# Patient Record
Sex: FEMALE | Race: WHITE | NOT HISPANIC OR LATINO | Employment: OTHER | URBAN - METROPOLITAN AREA
[De-identification: names, ages, dates, MRNs, and addresses within clinical notes are randomized per-mention and may not be internally consistent; named-entity substitution may affect disease eponyms.]

---

## 2017-01-05 ENCOUNTER — OUTPATIENT INFUSION SERVICES (OUTPATIENT)
Dept: ONCOLOGY | Facility: MEDICAL CENTER | Age: 67
End: 2017-01-05
Attending: FAMILY MEDICINE
Payer: MEDICARE

## 2017-01-05 VITALS
TEMPERATURE: 97.5 F | BODY MASS INDEX: 24.2 KG/M2 | OXYGEN SATURATION: 99 % | DIASTOLIC BLOOD PRESSURE: 77 MMHG | WEIGHT: 150.57 LBS | RESPIRATION RATE: 18 BRPM | HEIGHT: 66 IN | SYSTOLIC BLOOD PRESSURE: 148 MMHG | HEART RATE: 82 BPM

## 2017-01-05 LAB
CA-I BLD ISE-SCNC: 1.15 MMOL/L (ref 1.1–1.3)
CREAT BLD-MCNC: 0.7 MG/DL (ref 0.5–1.4)

## 2017-01-05 PROCEDURE — 700111 HCHG RX REV CODE 636 W/ 250 OVERRIDE (IP): Performed by: NURSE PRACTITIONER

## 2017-01-05 PROCEDURE — 82330 ASSAY OF CALCIUM: CPT

## 2017-01-05 PROCEDURE — 82565 ASSAY OF CREATININE: CPT

## 2017-01-05 PROCEDURE — 36415 COLL VENOUS BLD VENIPUNCTURE: CPT

## 2017-01-05 PROCEDURE — 96374 THER/PROPH/DIAG INJ IV PUSH: CPT

## 2017-01-05 RX ORDER — IBANDRONATE SODIUM 3 MG/3 ML
3 SYRINGE (ML) INTRAVENOUS ONCE
Status: COMPLETED | OUTPATIENT
Start: 2017-01-05 | End: 2017-01-05

## 2017-01-05 RX ADMIN — IBANDRONATE SODIUM 3 MG: 3 INJECTION, SOLUTION INTRAVENOUS at 11:51

## 2017-01-05 NOTE — PROGRESS NOTES
Pt presents ambulatory with  for Boniva injection. Pt reports feeling well with no s/s of infection and no invasive dental work within the last 2 months. IV established and labs drawn. Labs reviewed and WNL for treatment. IV push of medication given slowly over 30 seconds and line rinsed with saline. IV d/c'd with gauze and coband applied to site. Pt to return in 3 months, card given to pt. Pt left ambulatory with  in no distress.

## 2017-01-05 NOTE — PROGRESS NOTES
Labs 01/05/17    IstatCr = 0.7 mg/dL   ~CrCl = 83 mL/min  Ionized Ca+2 = 1.15 mmol/L    Ok for Boniva 3 mg IVP over 15-30 seconds today.  Last dose 10/04/16.    Julissa Weaver, RubenD

## 2017-01-17 ENCOUNTER — HOSPITAL ENCOUNTER (OUTPATIENT)
Dept: LAB | Facility: MEDICAL CENTER | Age: 67
End: 2017-01-17
Attending: FAMILY MEDICINE
Payer: MEDICARE

## 2017-01-17 ENCOUNTER — HOSPITAL ENCOUNTER (OUTPATIENT)
Dept: RADIOLOGY | Facility: MEDICAL CENTER | Age: 67
End: 2017-01-17
Attending: FAMILY MEDICINE
Payer: MEDICARE

## 2017-01-17 DIAGNOSIS — M54.2 CERVICALGIA: ICD-10-CM

## 2017-01-17 LAB
25(OH)D3 SERPL-MCNC: 44 NG/ML (ref 30–100)
ALBUMIN SERPL BCP-MCNC: 3.8 G/DL (ref 3.2–4.9)
ALBUMIN/GLOB SERPL: 1.4 G/DL
ALP SERPL-CCNC: 87 U/L (ref 30–99)
ALT SERPL-CCNC: 46 U/L (ref 2–50)
ANION GAP SERPL CALC-SCNC: 5 MMOL/L (ref 0–11.9)
AST SERPL-CCNC: 35 U/L (ref 12–45)
BASOPHILS # BLD AUTO: 0.07 K/UL (ref 0–0.12)
BASOPHILS NFR BLD AUTO: 1.2 % (ref 0–1.8)
BILIRUB SERPL-MCNC: 0.9 MG/DL (ref 0.1–1.5)
BUN SERPL-MCNC: 15 MG/DL (ref 8–22)
CALCIUM SERPL-MCNC: 9.3 MG/DL (ref 8.5–10.5)
CHLORIDE SERPL-SCNC: 108 MMOL/L (ref 96–112)
CHOLEST SERPL-MCNC: 123 MG/DL (ref 100–199)
CO2 SERPL-SCNC: 26 MMOL/L (ref 20–33)
CREAT SERPL-MCNC: 0.75 MG/DL (ref 0.5–1.4)
EOSINOPHIL # BLD: 0.16 K/UL (ref 0–0.51)
EOSINOPHIL NFR BLD AUTO: 2.7 % (ref 0–6.9)
ERYTHROCYTE [DISTWIDTH] IN BLOOD BY AUTOMATED COUNT: 45.4 FL (ref 35.9–50)
GLOBULIN SER CALC-MCNC: 2.7 G/DL (ref 1.9–3.5)
GLUCOSE SERPL-MCNC: 82 MG/DL (ref 65–99)
HCT VFR BLD AUTO: 45.2 % (ref 37–47)
HDLC SERPL-MCNC: 52 MG/DL
HGB BLD-MCNC: 15.3 G/DL (ref 12–16)
IMM GRANULOCYTES # BLD AUTO: 0.01 K/UL (ref 0–0.11)
IMM GRANULOCYTES NFR BLD AUTO: 0.2 % (ref 0–0.9)
LDLC SERPL CALC-MCNC: 54 MG/DL
LYMPHOCYTES # BLD: 1.89 K/UL (ref 1–4.8)
LYMPHOCYTES NFR BLD AUTO: 31.4 % (ref 22–41)
MCH RBC QN AUTO: 32 PG (ref 27–33)
MCHC RBC AUTO-ENTMCNC: 33.8 G/DL (ref 33.6–35)
MCV RBC AUTO: 94.6 FL (ref 81.4–97.8)
MONOCYTES # BLD: 0.46 K/UL (ref 0–0.85)
MONOCYTES NFR BLD AUTO: 7.7 % (ref 0–13.4)
NEUTROPHILS # BLD: 3.42 K/UL (ref 2–7.15)
NEUTROPHILS NFR BLD AUTO: 56.8 % (ref 44–72)
NRBC # BLD AUTO: 0 K/UL
NRBC BLD-RTO: 0 /100 WBC
PLATELET # BLD AUTO: 281 K/UL (ref 164–446)
PMV BLD AUTO: 10.6 FL (ref 9–12.9)
POTASSIUM SERPL-SCNC: 3.9 MMOL/L (ref 3.6–5.5)
PROT SERPL-MCNC: 6.5 G/DL (ref 6–8.2)
RBC # BLD AUTO: 4.78 M/UL (ref 4.2–5.4)
SODIUM SERPL-SCNC: 139 MMOL/L (ref 135–145)
TRIGL SERPL-MCNC: 87 MG/DL (ref 0–149)
TSH SERPL DL<=0.005 MIU/L-ACNC: 1.36 UIU/ML (ref 0.3–3.7)
WBC # BLD AUTO: 6 K/UL (ref 4.8–10.8)

## 2017-01-17 PROCEDURE — 72050 X-RAY EXAM NECK SPINE 4/5VWS: CPT

## 2017-01-23 ENCOUNTER — HOSPITAL ENCOUNTER (OUTPATIENT)
Dept: RADIOLOGY | Facility: MEDICAL CENTER | Age: 67
End: 2017-01-23
Attending: FAMILY MEDICINE
Payer: MEDICARE

## 2017-01-23 DIAGNOSIS — R94.31 NONSPECIFIC ABNORMAL ELECTROCARDIOGRAM (ECG) (EKG): ICD-10-CM

## 2017-01-23 PROCEDURE — A9502 TC99M TETROFOSMIN: HCPCS

## 2017-01-23 PROCEDURE — 700111 HCHG RX REV CODE 636 W/ 250 OVERRIDE (IP)

## 2017-01-23 RX ORDER — AMINOPHYLLINE 25 MG/ML
INJECTION, SOLUTION INTRAVENOUS
Status: COMPLETED
Start: 2017-01-23 | End: 2017-01-23

## 2017-01-23 RX ORDER — REGADENOSON 0.08 MG/ML
INJECTION, SOLUTION INTRAVENOUS
Status: COMPLETED
Start: 2017-01-23 | End: 2017-01-23

## 2017-01-23 RX ADMIN — AMINOPHYLLINE 100 MG: 25 INJECTION, SOLUTION INTRAVENOUS at 09:39

## 2017-01-23 RX ADMIN — REGADENOSON 0.4 MG: 0.08 INJECTION, SOLUTION INTRAVENOUS at 09:34

## 2017-01-23 NOTE — PROGRESS NOTES
Nursing care plan includes knowledge deficit, potential for discomfort, potential for compromised cardiac output. POC includes teaching, comfort measures and reassurance, and access to code cart, cardiology stand by and availability of rapid response team. Pt verbalizes good understanding of benefits and risks of pharmacological cardiac stress test. Informed consent obtained.Lexiscan given, pt developed following symptoms: SOB headache, nausea tingling hands. Reversed with Aminophylline per protocol. VS stable, symptoms resolved. To waiting room,  Fluids and/or snack given, awaiting second scan.Nursing goals met.

## 2017-02-01 ENCOUNTER — HOSPITAL ENCOUNTER (OUTPATIENT)
Dept: RADIOLOGY | Facility: MEDICAL CENTER | Age: 67
End: 2017-02-01
Attending: FAMILY MEDICINE
Payer: MEDICARE

## 2017-02-01 DIAGNOSIS — R92.2 INCONCLUSIVE MAMMOGRAM: ICD-10-CM

## 2017-02-01 PROCEDURE — G0204 DX MAMMO INCL CAD BI: HCPCS

## 2017-02-03 ENCOUNTER — APPOINTMENT (OUTPATIENT)
Dept: PHYSICAL THERAPY | Facility: REHABILITATION | Age: 67
End: 2017-02-03
Payer: MEDICARE

## 2017-02-20 ENCOUNTER — HOSPITAL ENCOUNTER (OUTPATIENT)
Dept: RADIOLOGY | Facility: MEDICAL CENTER | Age: 67
End: 2017-02-20
Attending: FAMILY MEDICINE
Payer: MEDICARE

## 2017-02-20 DIAGNOSIS — R60.9 EDEMA, UNSPECIFIED TYPE: ICD-10-CM

## 2017-02-20 PROCEDURE — 93970 EXTREMITY STUDY: CPT | Mod: 26 | Performed by: SURGERY

## 2017-02-20 PROCEDURE — 93970 EXTREMITY STUDY: CPT

## 2017-02-21 ENCOUNTER — HOSPITAL ENCOUNTER (OUTPATIENT)
Dept: LAB | Facility: MEDICAL CENTER | Age: 67
End: 2017-02-21
Attending: FAMILY MEDICINE
Payer: MEDICARE

## 2017-02-21 LAB
ALBUMIN SERPL BCP-MCNC: 4 G/DL (ref 3.2–4.9)
ALBUMIN/GLOB SERPL: 1.3 G/DL
ALP SERPL-CCNC: 98 U/L (ref 30–99)
ALT SERPL-CCNC: 54 U/L (ref 2–50)
ANION GAP SERPL CALC-SCNC: 5 MMOL/L (ref 0–11.9)
APPEARANCE UR: CLEAR
AST SERPL-CCNC: 34 U/L (ref 12–45)
BACTERIA #/AREA URNS HPF: ABNORMAL /HPF
BASOPHILS # BLD AUTO: 0.06 K/UL (ref 0–0.12)
BASOPHILS NFR BLD AUTO: 1 % (ref 0–1.8)
BILIRUB SERPL-MCNC: 0.6 MG/DL (ref 0.1–1.5)
BILIRUB UR QL STRIP.AUTO: NEGATIVE
BUN SERPL-MCNC: 21 MG/DL (ref 8–22)
CALCIUM SERPL-MCNC: 9.8 MG/DL (ref 8.5–10.5)
CHLORIDE SERPL-SCNC: 107 MMOL/L (ref 96–112)
CO2 SERPL-SCNC: 25 MMOL/L (ref 20–33)
COLOR UR AUTO: YELLOW
CREAT SERPL-MCNC: 0.63 MG/DL (ref 0.5–1.4)
CULTURE IF INDICATED INDCX: NO UA CULTURE
EOSINOPHIL # BLD: 0.13 K/UL (ref 0–0.51)
EOSINOPHIL NFR BLD AUTO: 2.1 % (ref 0–6.9)
EPITHELIAL CELLS 1715: ABNORMAL /HPF
ERYTHROCYTE [DISTWIDTH] IN BLOOD BY AUTOMATED COUNT: 45.4 FL (ref 35.9–50)
GLOBULIN SER CALC-MCNC: 3.2 G/DL (ref 1.9–3.5)
GLUCOSE SERPL-MCNC: 91 MG/DL (ref 65–99)
GLUCOSE UR STRIP.AUTO-MCNC: NEGATIVE MG/DL
HCT VFR BLD AUTO: 43.1 % (ref 37–47)
HGB BLD-MCNC: 14.4 G/DL (ref 12–16)
IMM GRANULOCYTES # BLD AUTO: 0.01 K/UL (ref 0–0.11)
IMM GRANULOCYTES NFR BLD AUTO: 0.2 % (ref 0–0.9)
KETONES UR STRIP.AUTO-MCNC: NEGATIVE MG/DL
LEUKOCYTE ESTERASE UR QL STRIP.AUTO: NEGATIVE
LYMPHOCYTES # BLD: 1.95 K/UL (ref 1–4.8)
LYMPHOCYTES NFR BLD AUTO: 31.2 % (ref 22–41)
MCH RBC QN AUTO: 31.4 PG (ref 27–33)
MCHC RBC AUTO-ENTMCNC: 33.4 G/DL (ref 33.6–35)
MCV RBC AUTO: 94.1 FL (ref 81.4–97.8)
MICRO URNS: ABNORMAL
MONOCYTES # BLD: 0.52 K/UL (ref 0–0.85)
MONOCYTES NFR BLD AUTO: 8.3 % (ref 0–13.4)
MUCOUS THREADS URNS QL MICRO: ABNORMAL /HPF
NEUTROPHILS # BLD: 3.58 K/UL (ref 2–7.15)
NEUTROPHILS NFR BLD AUTO: 57.2 % (ref 44–72)
NITRITE UR QL STRIP.AUTO: NEGATIVE
NRBC # BLD AUTO: 0.02 K/UL
NRBC BLD-RTO: 0.3 /100 WBC
PH UR: 5 [PH]
PLATELET # BLD AUTO: 273 K/UL (ref 164–446)
PMV BLD AUTO: 10.5 FL (ref 9–12.9)
POTASSIUM SERPL-SCNC: 3.7 MMOL/L (ref 3.6–5.5)
PROT SERPL-MCNC: 7.2 G/DL (ref 6–8.2)
PROT UR QL STRIP: 30 MG/DL
RBC # BLD AUTO: 4.58 M/UL (ref 4.2–5.4)
RBC #/AREA URNS HPF: ABNORMAL /HPF
RBC UR QL AUTO: NEGATIVE
SODIUM SERPL-SCNC: 137 MMOL/L (ref 135–145)
SP GR UR STRIP.AUTO: 1.03
TSH SERPL DL<=0.005 MIU/L-ACNC: 1.61 UIU/ML (ref 0.3–3.7)
WBC # BLD AUTO: 6.3 K/UL (ref 4.8–10.8)
WBC #/AREA URNS HPF: ABNORMAL /HPF

## 2017-02-21 PROCEDURE — 85025 COMPLETE CBC W/AUTO DIFF WBC: CPT

## 2017-02-21 PROCEDURE — 81001 URINALYSIS AUTO W/SCOPE: CPT

## 2017-02-21 PROCEDURE — 36415 COLL VENOUS BLD VENIPUNCTURE: CPT

## 2017-02-21 PROCEDURE — 80053 COMPREHEN METABOLIC PANEL: CPT

## 2017-02-21 PROCEDURE — 84443 ASSAY THYROID STIM HORMONE: CPT

## 2017-03-02 ENCOUNTER — HOSPITAL ENCOUNTER (OUTPATIENT)
Dept: PHYSICAL THERAPY | Facility: MEDICAL CENTER | Age: 67
End: 2017-03-02
Attending: FAMILY MEDICINE
Payer: MEDICARE

## 2017-03-02 ENCOUNTER — APPOINTMENT (OUTPATIENT)
Dept: ONCOLOGY | Facility: MEDICAL CENTER | Age: 67
End: 2017-03-02
Attending: FAMILY MEDICINE
Payer: MEDICARE

## 2017-03-02 PROCEDURE — 97161 PT EVAL LOW COMPLEX 20 MIN: CPT

## 2017-03-02 PROCEDURE — 97110 THERAPEUTIC EXERCISES: CPT

## 2017-03-07 ENCOUNTER — HOSPITAL ENCOUNTER (OUTPATIENT)
Dept: PHYSICAL THERAPY | Facility: MEDICAL CENTER | Age: 67
End: 2017-03-07
Attending: FAMILY MEDICINE
Payer: MEDICARE

## 2017-03-07 PROCEDURE — 97110 THERAPEUTIC EXERCISES: CPT

## 2017-03-07 PROCEDURE — 97140 MANUAL THERAPY 1/> REGIONS: CPT

## 2017-03-08 ENCOUNTER — HOSPITAL ENCOUNTER (OUTPATIENT)
Dept: LAB | Facility: MEDICAL CENTER | Age: 67
End: 2017-03-08
Attending: FAMILY MEDICINE
Payer: MEDICARE

## 2017-03-08 LAB
ALT SERPL-CCNC: 45 U/L (ref 2–50)
AST SERPL-CCNC: 28 U/L (ref 12–45)
ERYTHROCYTE [SEDIMENTATION RATE] IN BLOOD BY WESTERGREN METHOD: 5 MM/HOUR (ref 0–30)
FERRITIN SERPL-MCNC: 86.1 NG/ML (ref 10–291)
HAV IGM SERPL QL IA: NEGATIVE
HBV CORE IGM SERPL QL IA: NEGATIVE
HBV SURFACE AG SERPL QL IA: NEGATIVE
HCV AB S/CO SERPL IA: NEGATIVE
IRON SATN MFR SERPL: 28 % (ref 15–55)
IRON SERPL-MCNC: 81 UG/DL (ref 40–170)
TIBC SERPL-MCNC: 290 UG/DL (ref 250–450)
TRANSFERRIN SERPL-MCNC: 214 MG/DL (ref 200–370)

## 2017-03-08 PROCEDURE — 86038 ANTINUCLEAR ANTIBODIES: CPT

## 2017-03-08 PROCEDURE — 82103 ALPHA-1-ANTITRYPSIN TOTAL: CPT

## 2017-03-08 PROCEDURE — 82390 ASSAY OF CERULOPLASMIN: CPT

## 2017-03-08 PROCEDURE — 84450 TRANSFERASE (AST) (SGOT): CPT

## 2017-03-08 PROCEDURE — 83550 IRON BINDING TEST: CPT

## 2017-03-08 PROCEDURE — 36415 COLL VENOUS BLD VENIPUNCTURE: CPT

## 2017-03-08 PROCEDURE — 85652 RBC SED RATE AUTOMATED: CPT

## 2017-03-08 PROCEDURE — 80074 ACUTE HEPATITIS PANEL: CPT

## 2017-03-08 PROCEDURE — 82728 ASSAY OF FERRITIN: CPT

## 2017-03-08 PROCEDURE — 84466 ASSAY OF TRANSFERRIN: CPT

## 2017-03-08 PROCEDURE — 83540 ASSAY OF IRON: CPT

## 2017-03-08 PROCEDURE — 84460 ALANINE AMINO (ALT) (SGPT): CPT

## 2017-03-09 ENCOUNTER — HOSPITAL ENCOUNTER (OUTPATIENT)
Dept: PHYSICAL THERAPY | Facility: MEDICAL CENTER | Age: 67
End: 2017-03-09
Attending: FAMILY MEDICINE
Payer: MEDICARE

## 2017-03-09 PROCEDURE — 97110 THERAPEUTIC EXERCISES: CPT

## 2017-03-10 LAB
A1AT SERPL-MCNC: 129 MG/DL (ref 90–200)
CERULOPLASMIN SERPL-MCNC: 24 MG/DL (ref 17–54)

## 2017-03-11 LAB
NUCLEAR IGG SER QL IA: DETECTED
NUCLEAR IGG TITR SER IF: ABNORMAL {TITER}

## 2017-03-13 ENCOUNTER — HOSPITAL ENCOUNTER (OUTPATIENT)
Dept: PHYSICAL THERAPY | Facility: MEDICAL CENTER | Age: 67
End: 2017-03-13
Attending: FAMILY MEDICINE
Payer: MEDICARE

## 2017-03-13 PROCEDURE — 97110 THERAPEUTIC EXERCISES: CPT

## 2017-03-14 ENCOUNTER — HOSPITAL ENCOUNTER (OUTPATIENT)
Dept: RADIOLOGY | Facility: MEDICAL CENTER | Age: 67
End: 2017-03-14
Attending: FAMILY MEDICINE
Payer: MEDICARE

## 2017-03-14 DIAGNOSIS — K76.9 LIVER DISEASE: ICD-10-CM

## 2017-03-14 PROCEDURE — 76700 US EXAM ABDOM COMPLETE: CPT

## 2017-03-15 ENCOUNTER — HOSPITAL ENCOUNTER (OUTPATIENT)
Dept: PHYSICAL THERAPY | Facility: MEDICAL CENTER | Age: 67
End: 2017-03-15
Attending: FAMILY MEDICINE
Payer: MEDICARE

## 2017-03-15 PROCEDURE — 97110 THERAPEUTIC EXERCISES: CPT

## 2017-03-15 PROCEDURE — 97140 MANUAL THERAPY 1/> REGIONS: CPT

## 2017-03-21 ENCOUNTER — HOSPITAL ENCOUNTER (OUTPATIENT)
Dept: PHYSICAL THERAPY | Facility: MEDICAL CENTER | Age: 67
End: 2017-03-21
Attending: FAMILY MEDICINE
Payer: MEDICARE

## 2017-03-21 PROCEDURE — 97110 THERAPEUTIC EXERCISES: CPT

## 2017-03-23 ENCOUNTER — HOSPITAL ENCOUNTER (OUTPATIENT)
Dept: PHYSICAL THERAPY | Facility: MEDICAL CENTER | Age: 67
End: 2017-03-23
Attending: FAMILY MEDICINE
Payer: MEDICARE

## 2017-03-23 PROCEDURE — 97140 MANUAL THERAPY 1/> REGIONS: CPT

## 2017-03-23 PROCEDURE — 97110 THERAPEUTIC EXERCISES: CPT

## 2017-03-28 ENCOUNTER — HOSPITAL ENCOUNTER (OUTPATIENT)
Dept: PHYSICAL THERAPY | Facility: MEDICAL CENTER | Age: 67
End: 2017-03-28
Attending: FAMILY MEDICINE
Payer: MEDICARE

## 2017-03-28 PROCEDURE — 97140 MANUAL THERAPY 1/> REGIONS: CPT

## 2017-03-28 PROCEDURE — 97110 THERAPEUTIC EXERCISES: CPT

## 2017-03-30 ENCOUNTER — HOSPITAL ENCOUNTER (OUTPATIENT)
Dept: PHYSICAL THERAPY | Facility: MEDICAL CENTER | Age: 67
End: 2017-03-30
Attending: FAMILY MEDICINE
Payer: MEDICARE

## 2017-03-30 PROCEDURE — 97110 THERAPEUTIC EXERCISES: CPT

## 2017-04-06 ENCOUNTER — OUTPATIENT INFUSION SERVICES (OUTPATIENT)
Dept: ONCOLOGY | Facility: MEDICAL CENTER | Age: 67
End: 2017-04-06
Attending: FAMILY MEDICINE
Payer: MEDICARE

## 2017-04-06 VITALS
HEART RATE: 70 BPM | HEIGHT: 67 IN | SYSTOLIC BLOOD PRESSURE: 130 MMHG | RESPIRATION RATE: 18 BRPM | OXYGEN SATURATION: 97 % | WEIGHT: 150.79 LBS | TEMPERATURE: 98.5 F | BODY MASS INDEX: 23.67 KG/M2 | DIASTOLIC BLOOD PRESSURE: 75 MMHG

## 2017-04-06 LAB
CA-I BLD ISE-SCNC: 1.25 MMOL/L (ref 1.1–1.3)
CREAT BLD-MCNC: 0.7 MG/DL (ref 0.5–1.4)

## 2017-04-06 PROCEDURE — 700111 HCHG RX REV CODE 636 W/ 250 OVERRIDE (IP): Performed by: FAMILY MEDICINE

## 2017-04-06 PROCEDURE — 82330 ASSAY OF CALCIUM: CPT

## 2017-04-06 PROCEDURE — 82565 ASSAY OF CREATININE: CPT

## 2017-04-06 PROCEDURE — 96374 THER/PROPH/DIAG INJ IV PUSH: CPT

## 2017-04-06 PROCEDURE — 36415 COLL VENOUS BLD VENIPUNCTURE: CPT

## 2017-04-06 RX ORDER — IBANDRONATE SODIUM 3 MG/3 ML
3 SYRINGE (ML) INTRAVENOUS ONCE
Status: COMPLETED | OUTPATIENT
Start: 2017-04-06 | End: 2017-04-06

## 2017-04-06 RX ADMIN — IBANDRONATE SODIUM 3 MG: 3 INJECTION, SOLUTION INTRAVENOUS at 11:00

## 2017-04-06 ASSESSMENT — PAIN SCALES - GENERAL: PAINLEVEL: NO PAIN

## 2017-04-06 NOTE — PROGRESS NOTES
Pharmacy Note:    iCa = 1.25  SCr = 0.7, est CrCl ~ 83 mL/min    Okay to receive Boniva today.    Gabriella Gao, PharmD

## 2017-04-06 NOTE — Clinical Note
Infusion Services   [address] [place] [address]  ABRAHAM Garcia [address]  Phone: [phone]  Fax: [phone]              Dear Dr. Bean,    Your patient, Isabel Peralta (: 1950), was scheduled at [facility].  Isabel Lilly's encounter diagnosis is:  No diagnosis found.  She arrived for her appointment, and  the scheduled treatment was   given. These medications were administered to the patient: We administered ibandronate..  Isabel Lilly tolerated treatment.. In addition, the following labs were drawn    Recent Results (from the past 24 hour(s))   ISTAT CREATININE    Collection Time: 17 10:49 AM   Result Value Ref Range    Istat Creatinine 0.7 0.5 - 1.4 mg/dL   ISTAT IONIZED CA    Collection Time: 17 10:49 AM   Result Value Ref Range    Istat Ionized Calcium 1.25 1.10 - 1.30 mmol/L            Her next appointment is rescheduled for 2017.    For more information, you may review the nurse's progress notes in chart review under the notes section.       Sincerely,  Sierra Kang R.N.

## 2017-04-06 NOTE — PROGRESS NOTES
Patient here for Boniva. Denies any recent invasive dental work or planned invasive dental work. PIV established. Labs drawn. Lab within parameters to receive Boniva today. Boniva given over 30 seconds. PIV flushed with normal saline afterwards. PIV removed; gauze/coban applied over site. Next appointment scheduled. Returns in 3 months. Discharged to self care; no apparent distress noted.

## 2017-04-27 ENCOUNTER — HOSPITAL ENCOUNTER (OUTPATIENT)
Dept: RADIOLOGY | Facility: MEDICAL CENTER | Age: 67
End: 2017-04-27
Attending: FAMILY MEDICINE
Payer: MEDICARE

## 2017-04-27 DIAGNOSIS — G45.9 INTERMITTENT CEREBRAL ISCHEMIA: ICD-10-CM

## 2017-04-27 DIAGNOSIS — I65.29 STENOSIS OF CAROTID ARTERY, UNSPECIFIED LATERALITY: ICD-10-CM

## 2017-04-27 PROCEDURE — 93880 EXTRACRANIAL BILAT STUDY: CPT

## 2017-04-27 PROCEDURE — 700117 HCHG RX CONTRAST REV CODE 255: Performed by: FAMILY MEDICINE

## 2017-04-27 PROCEDURE — A9579 GAD-BASE MR CONTRAST NOS,1ML: HCPCS | Performed by: FAMILY MEDICINE

## 2017-04-27 PROCEDURE — 70553 MRI BRAIN STEM W/O & W/DYE: CPT

## 2017-04-27 RX ADMIN — GADODIAMIDE 15 ML: 287 INJECTION INTRAVENOUS at 14:53

## 2017-05-02 ENCOUNTER — HOSPITAL ENCOUNTER (OUTPATIENT)
Dept: PHYSICAL THERAPY | Facility: MEDICAL CENTER | Age: 67
End: 2017-05-02
Attending: FAMILY MEDICINE
Payer: MEDICARE

## 2017-05-02 PROCEDURE — 97110 THERAPEUTIC EXERCISES: CPT

## 2017-05-02 PROCEDURE — 97140 MANUAL THERAPY 1/> REGIONS: CPT

## 2017-06-08 ENCOUNTER — HOSPITAL ENCOUNTER (OUTPATIENT)
Dept: LAB | Facility: MEDICAL CENTER | Age: 67
End: 2017-06-08
Attending: INTERNAL MEDICINE
Payer: MEDICARE

## 2017-06-08 ENCOUNTER — HOSPITAL ENCOUNTER (OUTPATIENT)
Dept: LAB | Facility: MEDICAL CENTER | Age: 67
End: 2017-06-08
Attending: FAMILY MEDICINE
Payer: MEDICARE

## 2017-06-08 LAB
ALBUMIN SERPL BCP-MCNC: 3.8 G/DL (ref 3.2–4.9)
ALBUMIN/GLOB SERPL: 1.3 G/DL
ALP SERPL-CCNC: 113 U/L (ref 30–99)
ALT SERPL-CCNC: 80 U/L (ref 2–50)
ANION GAP SERPL CALC-SCNC: 8 MMOL/L (ref 0–11.9)
AST SERPL-CCNC: 48 U/L (ref 12–45)
BILIRUB SERPL-MCNC: 0.7 MG/DL (ref 0.1–1.5)
BUN SERPL-MCNC: 20 MG/DL (ref 8–22)
CALCIUM SERPL-MCNC: 9.4 MG/DL (ref 8.5–10.5)
CHLORIDE SERPL-SCNC: 111 MMOL/L (ref 96–112)
CHOLEST SERPL-MCNC: 128 MG/DL (ref 100–199)
CO2 SERPL-SCNC: 23 MMOL/L (ref 20–33)
CREAT SERPL-MCNC: 0.65 MG/DL (ref 0.5–1.4)
GFR SERPL CREATININE-BSD FRML MDRD: >60 ML/MIN/1.73 M 2
GLOBULIN SER CALC-MCNC: 3 G/DL (ref 1.9–3.5)
GLUCOSE SERPL-MCNC: 82 MG/DL (ref 65–99)
HDLC SERPL-MCNC: 53 MG/DL
LDLC SERPL CALC-MCNC: 62 MG/DL
POTASSIUM SERPL-SCNC: 4 MMOL/L (ref 3.6–5.5)
PROT SERPL-MCNC: 6.8 G/DL (ref 6–8.2)
SODIUM SERPL-SCNC: 142 MMOL/L (ref 135–145)
TRIGL SERPL-MCNC: 63 MG/DL (ref 0–149)

## 2017-06-08 PROCEDURE — 83516 IMMUNOASSAY NONANTIBODY: CPT

## 2017-06-10 LAB — TTG IGG SER IA-ACNC: 2 U/ML (ref 0–5)

## 2017-07-06 ENCOUNTER — OUTPATIENT INFUSION SERVICES (OUTPATIENT)
Dept: ONCOLOGY | Facility: MEDICAL CENTER | Age: 67
End: 2017-07-06
Attending: FAMILY MEDICINE
Payer: MEDICARE

## 2017-07-06 VITALS
OXYGEN SATURATION: 98 % | RESPIRATION RATE: 18 BRPM | SYSTOLIC BLOOD PRESSURE: 118 MMHG | DIASTOLIC BLOOD PRESSURE: 74 MMHG | HEART RATE: 65 BPM | TEMPERATURE: 98.4 F | HEIGHT: 67 IN | BODY MASS INDEX: 23.88 KG/M2 | WEIGHT: 152.12 LBS

## 2017-07-06 LAB
CA-I BLD ISE-SCNC: 1.13 MMOL/L (ref 1.1–1.3)
CREAT BLD-MCNC: 0.6 MG/DL (ref 0.5–1.4)

## 2017-07-06 PROCEDURE — 700111 HCHG RX REV CODE 636 W/ 250 OVERRIDE (IP): Performed by: FAMILY MEDICINE

## 2017-07-06 PROCEDURE — 36415 COLL VENOUS BLD VENIPUNCTURE: CPT

## 2017-07-06 PROCEDURE — 82565 ASSAY OF CREATININE: CPT

## 2017-07-06 PROCEDURE — 96374 THER/PROPH/DIAG INJ IV PUSH: CPT

## 2017-07-06 PROCEDURE — 82330 ASSAY OF CALCIUM: CPT

## 2017-07-06 RX ORDER — IBANDRONATE SODIUM 3 MG/3 ML
3 SYRINGE (ML) INTRAVENOUS ONCE
Status: COMPLETED | OUTPATIENT
Start: 2017-07-06 | End: 2017-07-06

## 2017-07-06 RX ADMIN — IBANDRONATE SODIUM 3 MG: 3 INJECTION, SOLUTION INTRAVENOUS at 11:06

## 2017-07-06 ASSESSMENT — PAIN SCALES - GENERAL: PAINLEVEL: NO PAIN

## 2017-07-06 NOTE — PROGRESS NOTES
Pt here for q 3 month Boniva.  Pt denies and dental surgeries in the past 2 months or planned in the future.  PIV started, labs drawn.  Pt within parameters to treat.  boniva infused by slow IV push.  Line flushed clear.  PIV removed post infusion.  Pt left IC, no s/s of distress.  Next apt scheduled, notified MD that we will need new order for October.

## 2017-07-06 NOTE — PROGRESS NOTES
Pharmacy Note:    Ca = 1.13 mmol/L  SCr = 0.6 mg/dL, est CrCl ~83 mL/min  Last Dose 4/6/17  Okay to receive Boniva today.    Robyn Schmidt, PharmD

## 2017-07-13 ENCOUNTER — HOSPITAL ENCOUNTER (OUTPATIENT)
Dept: LAB | Facility: MEDICAL CENTER | Age: 67
End: 2017-07-13
Attending: FAMILY MEDICINE
Payer: MEDICARE

## 2017-07-13 LAB
ALBUMIN SERPL BCP-MCNC: 3.9 G/DL (ref 3.2–4.9)
ALBUMIN/GLOB SERPL: 1.4 G/DL
ALP SERPL-CCNC: 100 U/L (ref 30–99)
ALT SERPL-CCNC: 24 U/L (ref 2–50)
ANION GAP SERPL CALC-SCNC: 8 MMOL/L (ref 0–11.9)
AST SERPL-CCNC: 23 U/L (ref 12–45)
BILIRUB SERPL-MCNC: 0.6 MG/DL (ref 0.1–1.5)
BUN SERPL-MCNC: 20 MG/DL (ref 8–22)
CALCIUM SERPL-MCNC: 9.8 MG/DL (ref 8.5–10.5)
CHLORIDE SERPL-SCNC: 108 MMOL/L (ref 96–112)
CO2 SERPL-SCNC: 22 MMOL/L (ref 20–33)
CREAT SERPL-MCNC: 0.77 MG/DL (ref 0.5–1.4)
GFR SERPL CREATININE-BSD FRML MDRD: >60 ML/MIN/1.73 M 2
GLOBULIN SER CALC-MCNC: 2.8 G/DL (ref 1.9–3.5)
GLUCOSE SERPL-MCNC: 92 MG/DL (ref 65–99)
POTASSIUM SERPL-SCNC: 4 MMOL/L (ref 3.6–5.5)
PROT SERPL-MCNC: 6.7 G/DL (ref 6–8.2)
SODIUM SERPL-SCNC: 138 MMOL/L (ref 135–145)

## 2017-07-13 PROCEDURE — 84080 ASSAY ALKALINE PHOSPHATASES: CPT

## 2017-07-13 PROCEDURE — 80053 COMPREHEN METABOLIC PANEL: CPT

## 2017-07-13 PROCEDURE — 84075 ASSAY ALKALINE PHOSPHATASE: CPT | Mod: 59

## 2017-07-13 PROCEDURE — 36415 COLL VENOUS BLD VENIPUNCTURE: CPT

## 2017-07-15 LAB
ALP BONE SERPL-CCNC: 39 U/L (ref 0–55)
ALP ISOS SERPL HS-CCNC: 0 U/L
ALP LIVER SERPL-CCNC: 72 U/L (ref 0–94)
ALP SERPL-CCNC: 111 U/L (ref 40–120)

## 2017-08-16 ENCOUNTER — HOSPITAL ENCOUNTER (OUTPATIENT)
Dept: RADIOLOGY | Facility: MEDICAL CENTER | Age: 67
End: 2017-08-16
Attending: FAMILY MEDICINE
Payer: MEDICARE

## 2017-08-16 DIAGNOSIS — R92.2 INCONCLUSIVE MAMMOGRAPHY: ICD-10-CM

## 2017-08-16 PROCEDURE — G0279 TOMOSYNTHESIS, MAMMO: HCPCS | Mod: LT

## 2017-09-02 ENCOUNTER — HOSPITAL ENCOUNTER (OUTPATIENT)
Dept: LAB | Facility: MEDICAL CENTER | Age: 67
End: 2017-09-02
Attending: INTERNAL MEDICINE
Payer: MEDICARE

## 2017-09-02 LAB
ALBUMIN SERPL BCP-MCNC: 3.7 G/DL (ref 3.2–4.9)
ALBUMIN/GLOB SERPL: 1.2 G/DL
ALP SERPL-CCNC: 83 U/L (ref 30–99)
ALT SERPL-CCNC: 18 U/L (ref 2–50)
ANION GAP SERPL CALC-SCNC: 9 MMOL/L (ref 0–11.9)
AST SERPL-CCNC: 18 U/L (ref 12–45)
BILIRUB SERPL-MCNC: 0.8 MG/DL (ref 0.1–1.5)
BUN SERPL-MCNC: 16 MG/DL (ref 8–22)
CALCIUM SERPL-MCNC: 9.4 MG/DL (ref 8.5–10.5)
CHLORIDE SERPL-SCNC: 110 MMOL/L (ref 96–112)
CO2 SERPL-SCNC: 22 MMOL/L (ref 20–33)
CREAT SERPL-MCNC: 0.68 MG/DL (ref 0.5–1.4)
FASTING STATUS PATIENT QL REPORTED: NORMAL
GFR SERPL CREATININE-BSD FRML MDRD: >60 ML/MIN/1.73 M 2
GLOBULIN SER CALC-MCNC: 3 G/DL (ref 1.9–3.5)
GLUCOSE SERPL-MCNC: 80 MG/DL (ref 65–99)
POTASSIUM SERPL-SCNC: 3.8 MMOL/L (ref 3.6–5.5)
PROT SERPL-MCNC: 6.7 G/DL (ref 6–8.2)
SODIUM SERPL-SCNC: 141 MMOL/L (ref 135–145)

## 2017-09-02 PROCEDURE — 36415 COLL VENOUS BLD VENIPUNCTURE: CPT

## 2017-09-02 PROCEDURE — 80053 COMPREHEN METABOLIC PANEL: CPT

## 2017-10-11 ENCOUNTER — OUTPATIENT INFUSION SERVICES (OUTPATIENT)
Dept: ONCOLOGY | Facility: MEDICAL CENTER | Age: 67
End: 2017-10-11
Attending: FAMILY MEDICINE
Payer: MEDICARE

## 2017-10-11 VITALS
SYSTOLIC BLOOD PRESSURE: 126 MMHG | DIASTOLIC BLOOD PRESSURE: 73 MMHG | HEART RATE: 66 BPM | OXYGEN SATURATION: 97 % | BODY MASS INDEX: 23.32 KG/M2 | RESPIRATION RATE: 18 BRPM | WEIGHT: 148.59 LBS | HEIGHT: 67 IN | TEMPERATURE: 97.7 F

## 2017-10-11 LAB
CA-I BLD ISE-SCNC: 1.15 MMOL/L (ref 1.1–1.3)
CREAT BLD-MCNC: 0.7 MG/DL (ref 0.5–1.4)

## 2017-10-11 PROCEDURE — 82330 ASSAY OF CALCIUM: CPT

## 2017-10-11 PROCEDURE — 36415 COLL VENOUS BLD VENIPUNCTURE: CPT

## 2017-10-11 PROCEDURE — 96374 THER/PROPH/DIAG INJ IV PUSH: CPT

## 2017-10-11 PROCEDURE — 82565 ASSAY OF CREATININE: CPT

## 2017-10-11 PROCEDURE — 700111 HCHG RX REV CODE 636 W/ 250 OVERRIDE (IP): Performed by: FAMILY MEDICINE

## 2017-10-11 RX ORDER — IBANDRONATE SODIUM 3 MG/3 ML
3 SYRINGE (ML) INTRAVENOUS ONCE
Status: COMPLETED | OUTPATIENT
Start: 2017-10-11 | End: 2017-10-11

## 2017-10-11 RX ADMIN — IBANDRONATE SODIUM 3 MG: 3 INJECTION, SOLUTION INTRAVENOUS at 11:54

## 2017-10-11 ASSESSMENT — PAIN SCALES - GENERAL: PAINLEVEL: NO PAIN

## 2017-10-11 NOTE — PROGRESS NOTES
Pharmacy Note:    Ca = 1.15mmol/L  SCr = 0.7mg/dL, est CrCl ~ 81mL/min  Last Dose 7/6/17  Okay to receive Boniva today.    GUSTAVO Mitchell Pharm.D.

## 2017-10-11 NOTE — PROGRESS NOTES
Pt arrived to [facility], ambulatory, for Boniva injection. Pt voices no complaints. Pt denies any recent or upcoming dental surgeries. 24g PIV established in the R-AC, positive blood return noted. Labs drawn per protocol, pt within parameters to treat today. Boniva administered via slow IV push with no s/sx of adverse reaction. PIV flushed and removed. Pt left IS with no s/sx of distress. Follow up appointment confirmed.

## 2017-11-02 ENCOUNTER — APPOINTMENT (RX ONLY)
Dept: URBAN - METROPOLITAN AREA CLINIC 20 | Facility: CLINIC | Age: 67
Setting detail: DERMATOLOGY
End: 2017-11-02

## 2017-11-02 DIAGNOSIS — L82.1 OTHER SEBORRHEIC KERATOSIS: ICD-10-CM

## 2017-11-02 DIAGNOSIS — L82.0 INFLAMED SEBORRHEIC KERATOSIS: ICD-10-CM

## 2017-11-02 DIAGNOSIS — L81.4 OTHER MELANIN HYPERPIGMENTATION: ICD-10-CM

## 2017-11-02 PROCEDURE — ? LIQUID NITROGEN

## 2017-11-02 PROCEDURE — ? COUNSELING

## 2017-11-02 PROCEDURE — 99213 OFFICE O/P EST LOW 20 MIN: CPT | Mod: 25

## 2017-11-02 PROCEDURE — 17110 DESTRUCTION B9 LES UP TO 14: CPT

## 2017-11-02 ASSESSMENT — LOCATION SIMPLE DESCRIPTION DERM
LOCATION SIMPLE: RIGHT PRETIBIAL REGION
LOCATION SIMPLE: CHEST
LOCATION SIMPLE: SUPERIOR FOREHEAD

## 2017-11-02 ASSESSMENT — LOCATION DETAILED DESCRIPTION DERM
LOCATION DETAILED: RIGHT LATERAL SUPERIOR CHEST
LOCATION DETAILED: SUPERIOR MID FOREHEAD
LOCATION DETAILED: RIGHT DISTAL PRETIBIAL REGION
LOCATION DETAILED: RIGHT PROXIMAL PRETIBIAL REGION

## 2017-11-02 ASSESSMENT — LOCATION ZONE DERM
LOCATION ZONE: TRUNK
LOCATION ZONE: LEG
LOCATION ZONE: FACE

## 2017-11-02 NOTE — HPI: SKIN LESION
Is This A New Presentation, Or A Follow-Up?: Skin Lesion
How Severe Is Your Skin Lesion?: mild
Has Your Skin Lesion Been Treated?: been treated
When Was It Treated?: 08/03/2017

## 2017-11-02 NOTE — PROCEDURE: LIQUID NITROGEN
Add 52 Modifier (Optional): no
Render Post-Care Instructions In Note?: yes
Consent: The patient's consent was obtained including but not limited to risks of crusting, scabbing, blistering, scarring, darker or lighter pigmentary change, recurrence, incomplete removal and infection.
Medical Necessity Clause: This procedure was medically necessary because the lesions that were treated were: inflamed
Detail Level: Detailed
Medical Necessity Information: It is in your best interest to select a reason for this procedure from the list below. All of these items fulfill various CMS LCD requirements except the new and changing color options.
Post-Care Instructions: I reviewed with the patient in detail post-care instructions. Patient is to wear sun protection, and avoid picking at any of the treated lesions. Pt may apply Vaseline to crusted or scabbing areas.

## 2017-11-03 ENCOUNTER — OFFICE VISIT (OUTPATIENT)
Dept: URGENT CARE | Facility: CLINIC | Age: 67
End: 2017-11-03
Payer: MEDICARE

## 2017-11-03 VITALS
DIASTOLIC BLOOD PRESSURE: 80 MMHG | HEART RATE: 84 BPM | WEIGHT: 148 LBS | SYSTOLIC BLOOD PRESSURE: 122 MMHG | OXYGEN SATURATION: 97 % | RESPIRATION RATE: 16 BRPM | TEMPERATURE: 99.1 F | BODY MASS INDEX: 23.78 KG/M2 | HEIGHT: 66 IN

## 2017-11-03 DIAGNOSIS — J01.90 ACUTE SINUSITIS, RECURRENCE NOT SPECIFIED, UNSPECIFIED LOCATION: ICD-10-CM

## 2017-11-03 PROCEDURE — 99203 OFFICE O/P NEW LOW 30 MIN: CPT | Performed by: FAMILY MEDICINE

## 2017-11-03 RX ORDER — AMOXICILLIN 500 MG/1
500 TABLET, FILM COATED ORAL
Qty: 30 TAB | Refills: 0 | Status: SHIPPED | OUTPATIENT
Start: 2017-11-03 | End: 2017-11-13

## 2017-11-03 RX ORDER — AMOXICILLIN AND CLAVULANATE POTASSIUM 875; 125 MG/1; MG/1
1 TABLET, FILM COATED ORAL 2 TIMES DAILY
Qty: 20 TAB | Refills: 0 | Status: SHIPPED | OUTPATIENT
Start: 2017-11-03 | End: 2017-11-03

## 2017-11-03 RX ORDER — AMOXICILLIN AND CLAVULANATE POTASSIUM 500; 125 MG/1; MG/1
1 TABLET, FILM COATED ORAL 3 TIMES DAILY
Qty: 30 TAB | Refills: 0 | Status: SHIPPED | OUTPATIENT
Start: 2017-11-03 | End: 2017-11-03

## 2017-11-03 NOTE — PROGRESS NOTES
"HPI: Isabel Peralta is a 67 y.o. female who presents with   Chief Complaint   Patient presents with   • Nasal Congestion     pressure and aches in face x 2 weeks       Patient has had 2 weeks of sinus pressure and purulent green nasal discharge over the past 4-5 days some mild fevers chills body aches and fatigue no cough or chest congestion  Worsened by: activity, laying supine at night, first thing in the morning, when exposed to outside allergens  Improved by: OTC symptomatic medictions      PMH:  has a past medical history of Anemia; Anesthesia; Cancer (CMS-HCC); Heart burn; High cholesterol; Indigestion; and Pain.  MEDS:   Current Outpatient Prescriptions:   •  amoxicillin-clavulanate (AUGMENTIN) 875-125 MG Tab, Take 1 Tab by mouth 2 times a day for 10 days. With food, Disp: 20 Tab, Rfl: 0  •  ibuprofen (MOTRIN) 200 MG TABS, Take 200 mg by mouth every 6 hours as needed., Disp: , Rfl:   •  pantoprazole (PROTONIX) 40 MG TBEC, Take 40 mg by mouth every day., Disp: , Rfl:   •  estradiol (VAGIFEM) 10 MCG TABS, Insert 10 mcg in vagina. Twice a week, Disp: , Rfl:   •  aspirin 81 MG tablet, Take 81 mg by mouth every day., Disp: , Rfl:   •  Cholecalciferol (VITAMIN D) 2000 UNITS CAPS, Take  by mouth every day., Disp: , Rfl:   •  Calcium-Magnesium-Vitamin D (CALCIUM 500 PO), Take 1,000 mg by mouth 2 Times a Day., Disp: , Rfl:   •  triamcinolone (NASACORT) 55 MCG/ACT nasal inhaler, Spray 1 Spray in nose every day., Disp: , Rfl:   •  Probiotic Product (PROBIOTIC DAILY PO), Take  by mouth., Disp: , Rfl:   ALLERGIES:   Allergies   Allergen Reactions   • Gluten Meal      Belching, burping, bloating, \"stomach churning\"     SURGHX:   Past Surgical History:   Procedure Laterality Date   • COLONOSCOPY - ENDO N/A 7/22/2015    Procedure: COLONOSCOPY - ENDO;  Surgeon: Jef Saavedra M.D.;  Location: SURGERY [facility];  Service:    • SHOULDER DECOMPRESSION ARTHROSCOPIC Right 5/7/2015    Procedure: SHOULDER " "DECOMPRESSION ARTHROSCOPIC;  Surgeon: Rohan Campos M.D.;  Location: SURGERY [facility];  Service:    • HARDWARE REMOVAL ORTHO Right 2015    Procedure: HARDWARE REMOVAL ORTHO;  Surgeon: Rohan Campos M.D.;  Location: SURGERY [facility];  Service:    • SHOULDER ARTHROSCOPY W/ ROTATOR CUFF REPAIR Right 2015    Procedure: SHOULDER ARTHROSCOPY W/ ROTATOR CUFF REPAIR;  Surgeon: Rohan Campos M.D.;  Location: SURGERY [facility];  Service:    • SHOULDER ARTHROSCOPY W/ ROTATOR CUFF REPAIR  2007    right   • HYSTERECTOMY, TOTAL ABDOMINAL     • OTHER ABDOMINAL SURGERY      perforated small intestine   • PRIMARY C SECTION  ,,         SOCHX:  reports that she quit smoking about 46 years ago. She has a 2.00 pack-year smoking history. She has never used smokeless tobacco. She reports that she does not drink alcohol or use drugs.  FH: Family history was reviewed, no pertinent findings to report    PE:  Vitals /80   Pulse 84   Temp 37.3 °C (99.1 °F)   Resp 16   Ht 1.676 m (5' 6\")   Wt 67.1 kg (148 lb)   SpO2 97%   BMI 23.89 kg/m²    Gen AOx4, NAD  HEENT: moist mucus membranes,  painAnd pressure with percussion of bilateral maxillary sinuses.  Bilateral conjunciva clear without erythema or exudate,  Bilateral TM's clear without bulge, fluid or loss of landmarks, no pharyngeal erythema or tonsillar exudate or tonsillar enlargement  Neck: supple, no cervical lymphadenopathy, no signs of menigismus  CV/PULM: RRR no murmurs, no rales ronchi or wheezes, no signs of resp distress  Abd soft nontender, bs present  Skin no rashes  Extremities -c/c/e  Neuro appropriate affect,     A/P  1. Acute sinusitis, recurrence not specified, unspecified location  amoxicillin-clavulanate (AUGMENTIN) 875-125 MG Tab     Follow-up with primary care provider within 4-5 days, emergency room precautions discussed.  Patient and/or family appears understanding of information.  Cont symptomatic meds  "

## 2017-12-06 ENCOUNTER — HOSPITAL ENCOUNTER (OUTPATIENT)
Dept: LAB | Facility: MEDICAL CENTER | Age: 67
End: 2017-12-06
Attending: FAMILY MEDICINE
Payer: MEDICARE

## 2017-12-06 PROCEDURE — 80061 LIPID PANEL: CPT

## 2017-12-06 PROCEDURE — 36415 COLL VENOUS BLD VENIPUNCTURE: CPT

## 2017-12-07 LAB
CHOLEST SERPL-MCNC: 205 MG/DL (ref 100–199)
HDLC SERPL-MCNC: 57 MG/DL
LDLC SERPL CALC-MCNC: 128 MG/DL
TRIGL SERPL-MCNC: 100 MG/DL (ref 0–149)

## 2018-01-10 ENCOUNTER — OUTPATIENT INFUSION SERVICES (OUTPATIENT)
Dept: ONCOLOGY | Facility: MEDICAL CENTER | Age: 68
End: 2018-01-10
Attending: FAMILY MEDICINE
Payer: MEDICARE

## 2018-01-10 VITALS
BODY MASS INDEX: 23.7 KG/M2 | HEART RATE: 75 BPM | HEIGHT: 67 IN | RESPIRATION RATE: 18 BRPM | SYSTOLIC BLOOD PRESSURE: 126 MMHG | OXYGEN SATURATION: 97 % | TEMPERATURE: 98 F | DIASTOLIC BLOOD PRESSURE: 76 MMHG | WEIGHT: 151.01 LBS

## 2018-01-10 LAB
CA-I BLD ISE-SCNC: 1.18 MMOL/L (ref 1.1–1.3)
CREAT BLD-MCNC: 0.7 MG/DL (ref 0.5–1.4)

## 2018-01-10 PROCEDURE — 96372 THER/PROPH/DIAG INJ SC/IM: CPT

## 2018-01-10 PROCEDURE — 700111 HCHG RX REV CODE 636 W/ 250 OVERRIDE (IP): Mod: JG | Performed by: FAMILY MEDICINE

## 2018-01-10 PROCEDURE — 82565 ASSAY OF CREATININE: CPT

## 2018-01-10 PROCEDURE — 36415 COLL VENOUS BLD VENIPUNCTURE: CPT

## 2018-01-10 PROCEDURE — 82330 ASSAY OF CALCIUM: CPT

## 2018-01-10 RX ORDER — EZETIMIBE 10 MG/1
10 TABLET ORAL DAILY
COMMUNITY
End: 2018-05-24 | Stop reason: SDUPTHER

## 2018-01-10 RX ORDER — IBANDRONATE SODIUM 3 MG/3 ML
3 SYRINGE (ML) INTRAVENOUS ONCE
Status: COMPLETED | OUTPATIENT
Start: 2018-01-10 | End: 2018-01-10

## 2018-01-10 RX ADMIN — IBANDRONATE SODIUM 3 MG: 3 INJECTION, SOLUTION INTRAVENOUS at 10:52

## 2018-01-10 ASSESSMENT — PAIN SCALES - GENERAL: PAINLEVEL: NO PAIN

## 2018-01-10 NOTE — PROGRESS NOTES
Pharmacy Note:    Ca = 1.18mmol/L  SCr = 0.7mg/dL, est CrCl ~ 82mL/min  Last Dose 10/11/17  Okay to receive Boniva today.    GUSTAVO Mitchell Pharm.D.

## 2018-02-16 ENCOUNTER — HOSPITAL ENCOUNTER (OUTPATIENT)
Dept: LAB | Facility: MEDICAL CENTER | Age: 68
End: 2018-02-16
Attending: FAMILY MEDICINE
Payer: MEDICARE

## 2018-02-16 LAB
ALBUMIN SERPL BCP-MCNC: 4 G/DL (ref 3.2–4.9)
ALBUMIN/GLOB SERPL: 1.4 G/DL
ALP SERPL-CCNC: 88 U/L (ref 30–99)
ALT SERPL-CCNC: 17 U/L (ref 2–50)
ANION GAP SERPL CALC-SCNC: 3 MMOL/L (ref 0–11.9)
AST SERPL-CCNC: 16 U/L (ref 12–45)
BILIRUB SERPL-MCNC: 0.6 MG/DL (ref 0.1–1.5)
BUN SERPL-MCNC: 14 MG/DL (ref 8–22)
CALCIUM SERPL-MCNC: 9.3 MG/DL (ref 8.5–10.5)
CHLORIDE SERPL-SCNC: 106 MMOL/L (ref 96–112)
CHOLEST SERPL-MCNC: 210 MG/DL (ref 100–199)
CO2 SERPL-SCNC: 29 MMOL/L (ref 20–33)
CREAT SERPL-MCNC: 0.74 MG/DL (ref 0.5–1.4)
GLOBULIN SER CALC-MCNC: 2.8 G/DL (ref 1.9–3.5)
GLUCOSE SERPL-MCNC: 82 MG/DL (ref 65–99)
HDLC SERPL-MCNC: 52 MG/DL
LDLC SERPL CALC-MCNC: 129 MG/DL
POTASSIUM SERPL-SCNC: 3.8 MMOL/L (ref 3.6–5.5)
PROT SERPL-MCNC: 6.8 G/DL (ref 6–8.2)
SODIUM SERPL-SCNC: 138 MMOL/L (ref 135–145)
TRIGL SERPL-MCNC: 146 MG/DL (ref 0–149)

## 2018-02-16 PROCEDURE — 36415 COLL VENOUS BLD VENIPUNCTURE: CPT

## 2018-02-16 PROCEDURE — 80061 LIPID PANEL: CPT

## 2018-02-16 PROCEDURE — 80053 COMPREHEN METABOLIC PANEL: CPT

## 2018-03-21 ENCOUNTER — OFFICE VISIT (OUTPATIENT)
Dept: VASCULAR LAB | Facility: MEDICAL CENTER | Age: 68
End: 2018-03-21
Attending: INTERNAL MEDICINE
Payer: MEDICARE

## 2018-03-21 VITALS
HEART RATE: 71 BPM | BODY MASS INDEX: 24.55 KG/M2 | WEIGHT: 156.4 LBS | DIASTOLIC BLOOD PRESSURE: 71 MMHG | HEIGHT: 67 IN | SYSTOLIC BLOOD PRESSURE: 144 MMHG

## 2018-03-21 DIAGNOSIS — E78.5 DYSLIPIDEMIA: ICD-10-CM

## 2018-03-21 DIAGNOSIS — I65.23 CAROTID ATHEROSCLEROSIS, BILATERAL: ICD-10-CM

## 2018-03-21 DIAGNOSIS — I72.8 SPLENIC ARTERY ANEURYSM (HCC): ICD-10-CM

## 2018-03-21 PROCEDURE — 99204 OFFICE O/P NEW MOD 45 MIN: CPT | Performed by: INTERNAL MEDICINE

## 2018-03-21 PROCEDURE — 99212 OFFICE O/P EST SF 10 MIN: CPT | Performed by: INTERNAL MEDICINE

## 2018-03-21 RX ORDER — ROSUVASTATIN CALCIUM 5 MG/1
5 TABLET, COATED ORAL EVERY EVENING
Qty: 30 TAB | Refills: 11 | Status: SHIPPED | OUTPATIENT
Start: 2018-03-21 | End: 2018-05-24 | Stop reason: SDUPTHER

## 2018-03-21 NOTE — PROGRESS NOTES
INITIAL VASCULAR VISIT  Subjective:   Isabel Peralta is a 67 y.o. female who presents today 3/21/2018 for   Chief Complaint   Patient presents with   • New Patient       HPI:  Patient referred for eval and management of carotid atherosclerosis, splenic artery aneurysm, and dyslipidemia  Mild atherosclerosis in carotids  No h/o tia or cva symptoms.  Was picked up on a vascular screening.   Stable on serial imaging  Has incidental finding of splenic artery aneurysm - has been stable on serial imaging.  Took atorvastatin for many years - stopped at rec of PCP due to lft abnormalities.  Patient was asymptomatic.  No myalgias  LFTs have normalized  No other known liver disease  Limited alcohol  Tolerating zetia  No h/o elevated BP  Takes Asa  + FH of CAD in mom    Past Medical History:   Diagnosis Date   • Anemia     history of   • Anesthesia     post op nausea   • Cancer (CMS-HCC)     basal cell skin cancer   • Carotid atherosclerosis, bilateral    • Celiac disease    • Heart burn    • High cholesterol    • Pain     right shoulder   • Splenic artery aneurysm (CMS-HCC)      Past Surgical History:   Procedure Laterality Date   • COLONOSCOPY - ENDO N/A 7/22/2015    Procedure: COLONOSCOPY - ENDO;  Surgeon: Jef Saavedra M.D.;  Location: [facility];  Service:    • SHOULDER DECOMPRESSION ARTHROSCOPIC Right 5/7/2015    Procedure: SHOULDER DECOMPRESSION ARTHROSCOPIC;  Surgeon: Rohan Campos M.D.;  Location: [facility];  Service:    • HARDWARE REMOVAL ORTHO Right 5/7/2015    Procedure: HARDWARE REMOVAL ORTHO;  Surgeon: Rohan Campos M.D.;  Location: [facility];  Service:    • SHOULDER ARTHROSCOPY W/ ROTATOR CUFF REPAIR Right 5/7/2015    Procedure: SHOULDER ARTHROSCOPY W/ ROTATOR CUFF REPAIR;  Surgeon: Rohan Campos M.D.;  Location: [facility];  Service:    • SHOULDER ARTHROSCOPY W/ ROTATOR CUFF REPAIR  2007    right   • HYSTERECTOMY, TOTAL ABDOMINAL  1980's  "  • OTHER ABDOMINAL SURGERY      perforated small intestine   • PRIMARY C SECTION  ,,         Family History   Problem Relation Age of Onset   • Heart Disease Mother      pci in 60s, cabg in 70s   • Hypertension Mother    • Hyperlipidemia Mother      History   Smoking Status   • Former Smoker   • Packs/day: 0.50   • Years: 4.00   • Quit date: 1971   Smokeless Tobacco   • Never Used     Social History   Substance Use Topics   • Smoking status: Former Smoker     Packs/day: 0.50     Years: 4.00     Quit date: 1971   • Smokeless tobacco: Never Used   • Alcohol use No     Outpatient Encounter Prescriptions as of 3/21/2018   Medication Sig Dispense Refill   • rosuvastatin (CRESTOR) 5 MG Tab Take 1 Tab by mouth every evening. 30 Tab 11   • ezetimibe (ZETIA) 10 MG Tab Take 10 mg by mouth every day.     • pantoprazole (PROTONIX) 40 MG TBEC Take 40 mg by mouth every day.     • estradiol (VAGIFEM) 10 MCG TABS Insert 10 mcg in vagina. Twice a week     • aspirin 81 MG tablet Take 81 mg by mouth every day.     • Cholecalciferol (VITAMIN D) 2000 UNITS CAPS Take  by mouth every day.     • Calcium-Magnesium-Vitamin D (CALCIUM 500 PO) Take 1,000 mg by mouth 2 Times a Day.     • triamcinolone (NASACORT) 55 MCG/ACT nasal inhaler Spray 1 Spray in nose every day.     • Probiotic Product (PROBIOTIC DAILY PO) Take  by mouth.     • ibuprofen (MOTRIN) 200 MG TABS Take 200 mg by mouth every 6 hours as needed.       No facility-administered encounter medications on file as of 3/21/2018.      Allergies   Allergen Reactions   • Gluten Meal      Belching, burping, bloating, \"stomach churning\"     DIET AND EXERCISE:  Weight Change: stable  Diet: celiac diet  Exercise: moderate regular exercise program     ROS - as per my intake form which I reviewed with her and signed     Objective:     Vitals:    18 1616 18 1623   BP: 148/74 144/71   Pulse: 70 71   Weight: 70.9 kg (156 lb 6.4 oz)    Height: 1.689 m (5' " "6.5\")       Body mass index is 24.87 kg/m².  Physical Exam   Constitutional: She is oriented to person, place, and time. She appears well-developed and well-nourished. No distress.   HENT:   Head: Normocephalic and atraumatic.   Eyes: Conjunctivae and EOM are normal. Pupils are equal, round, and reactive to light. No scleral icterus.   Neck: Normal range of motion. Neck supple. No JVD present. No thyromegaly present.   Cardiovascular: Normal rate, regular rhythm, normal heart sounds and intact distal pulses.  Exam reveals no gallop and no friction rub.    No murmur heard.  Pulmonary/Chest: Effort normal and breath sounds normal. No respiratory distress. She has no wheezes. She has no rales. She exhibits no tenderness.   Abdominal: Soft. Bowel sounds are normal. She exhibits no distension and no mass. There is no tenderness. There is no rebound and no guarding.   Musculoskeletal: Normal range of motion. She exhibits no edema or tenderness.   Neurological: She is alert and oriented to person, place, and time. She has normal reflexes. No cranial nerve deficit. Coordination normal.   Skin: Skin is warm and dry. No rash noted. She is not diaphoretic. No erythema. No pallor.   Psychiatric: She has a normal mood and affect.   Vitals reviewed.    Lab Results   Component Value Date    CHOLSTRLTOT 210 (H) 02/16/2018     (H) 02/16/2018    HDL 52 02/16/2018    TRIGLYCERIDE 146 02/16/2018           Lab Results   Component Value Date    SODIUM 138 02/16/2018    POTASSIUM 3.8 02/16/2018    CHLORIDE 106 02/16/2018    CO2 29 02/16/2018    GLUCOSE 82 02/16/2018    BUN 14 02/16/2018    CREATININE 0.74 02/16/2018    IFAFRICA >60 02/16/2018    IFNOTAFR >60 02/16/2018        Lab Results   Component Value Date    WBC 6.3 02/21/2017    RBC 4.58 02/21/2017    HEMOGLOBIN 14.4 02/21/2017    HEMATOCRIT 43.1 02/21/2017    MCV 94.1 02/21/2017    MCH 31.4 02/21/2017    MCHC 33.4 (L) 02/21/2017    MPV 10.5 02/21/2017      CTA abdomen aug " 2016:  1.  There is no change in 2 small splenic artery aneurysms measuring 10-11 mm in diameter with minimal peripheral thrombus and some peripheral calcification.  2.  There is mild atherosclerosis of the abdominal aorta.  3.  There is normal variant branching at the celiac axis with the common hepatic artery originating directly from the abdominal aorta at the level of the celiac axis.  4.  There may be a small hiatal hernia again seen    Stress MPI Jan 2017:  NUCLEAR IMAGING INTERPRETATION   No evidence of significant jeopardized viable myocardium or prior myocardial    infarction.   Normal left ventricular wall motion.  LV ejection fraction = 77%.   ECG INTERPRETATION   Negative stress ECG for ischemia.    Carotid duplex april 2017:  1.  There is a mild amount of atherosclerotic plaque.  Plaque is located in carotid bulbs and proximal internal and external carotid arteries.  Plaque characterization:  Calcific and irregular  2.  There is no evidence of carotid occlusion.  3. Vertebral arteries demonstrate antegrade flow.  4. Diameter reduction in the internal carotid arteries: less than 50%. There is no hemodynamically significant stenosis.    MRI head april 2017:  MRI of the brain without and with contrast within normal limits.        Medical Decision Making:  Today's Assessment / Status / Plan:     1. Carotid atherosclerosis, bilateral     2. Dyslipidemia  rosuvastatin (CRESTOR) 5 MG Tab    COMP METABOLIC PANEL    LIPID PROFILE    LIPOPROTEIN A    TSH    CREATINE KINASE    CPK - TOTAL SERUM   3. Splenic artery aneurysm (CMS-HCC)       Patient Type: Primary Prevention    Etiology of Established CVD if Present:   1) Mild carotid atherosclerosis, asymptomatic, no previous intervention  2) Splenic artery aneurysm - stable over time    Lipid Management: Qualifies for Statin Therapy Based on 2013 ACC/AHA Guidelines: yes  Calculated 10-Year Risk of ASCVD: 10.4%  Currently on Statin:  Started at today's visit  Patient  qualifies for at least mod intensity statin based on caculated ASCVD risk  Given subclinical atherosclerosis and FH would like to treat as high risk with goal LDL-C <70 and nonHDL <100  Poor tolerance of atorva in past due to asymptomatic LFT elevations which normalized at discontinuation.  Tolerating zetia, but still well above goal  Plan:  - continue zetia  - start crestor 5 mg 3x per week  - stop and call if any myalgias or abdominal symptoms  - follow lfts closely  - if lfts stable at f/u would slowly increase dose of crestor until reaches goal  - continue tlc  - recheck fasting lipids and lp(a) at next visit    Blood Pressure Management:  BP has been normal at past office visits, but is elevated in office today  - Start home BP monitoring    Glycemic Status: Normal  - check fasting glucose    Anti-Platelet/Anti-Coagulant Tx: yes  - continue low dose asa    Smoking: continue complete avoidance     Physical Activity: increase frequency    Weight Management and Nutrition: Dietary plan was discussed with patient at this visit including continued celiac diet    Other:     1) Mild carotid atherosclerosis, asymptomatic, no previous intervention.  Continue medical management.  Repeat duplex every 3 years or so (next in 2020)    2) Splenic artery aneurysm - stable over time.  Repeat CTA every 3 yers or so (next likely in 2019)    Instructed to follow-up with PCP for remainder of adult medical needs: yes  We will partner with other providers in the management of established vascular disease and cardiometabolic risk factors.    Studies to Be Obtained: none currently  Labs to Be Obtained: as above prior to next visit    Follow up in: 8 weeks    Michael J Bloch, M.D.      Cc:  ANJEL Bean MD

## 2018-03-22 ENCOUNTER — APPOINTMENT (OUTPATIENT)
Dept: VASCULAR LAB | Facility: MEDICAL CENTER | Age: 68
End: 2018-03-22
Payer: MEDICARE

## 2018-04-11 ENCOUNTER — APPOINTMENT (OUTPATIENT)
Dept: ONCOLOGY | Facility: MEDICAL CENTER | Age: 68
End: 2018-04-11
Attending: FAMILY MEDICINE
Payer: MEDICARE

## 2018-04-17 ENCOUNTER — HOSPITAL ENCOUNTER (OUTPATIENT)
Dept: RADIOLOGY | Facility: MEDICAL CENTER | Age: 68
End: 2018-04-17
Attending: SPECIALIST
Payer: MEDICARE

## 2018-04-17 DIAGNOSIS — Z12.39 SCREENING BREAST EXAMINATION: ICD-10-CM

## 2018-04-17 PROCEDURE — 77063 BREAST TOMOSYNTHESIS BI: CPT

## 2018-04-26 ENCOUNTER — HOSPITAL ENCOUNTER (OUTPATIENT)
Dept: RADIOLOGY | Facility: MEDICAL CENTER | Age: 68
End: 2018-04-26
Attending: FAMILY MEDICINE
Payer: MEDICARE

## 2018-04-26 DIAGNOSIS — M81.0 SENILE OSTEOPOROSIS: ICD-10-CM

## 2018-04-26 PROCEDURE — 77080 DXA BONE DENSITY AXIAL: CPT

## 2018-05-10 ENCOUNTER — HOSPITAL ENCOUNTER (OUTPATIENT)
Dept: LAB | Facility: MEDICAL CENTER | Age: 68
End: 2018-05-10
Attending: INTERNAL MEDICINE
Payer: MEDICARE

## 2018-05-10 ENCOUNTER — APPOINTMENT (RX ONLY)
Dept: URBAN - METROPOLITAN AREA CLINIC 20 | Facility: CLINIC | Age: 68
Setting detail: DERMATOLOGY
End: 2018-05-10

## 2018-05-10 DIAGNOSIS — D17 BENIGN LIPOMATOUS NEOPLASM: ICD-10-CM

## 2018-05-10 DIAGNOSIS — D485 NEOPLASM OF UNCERTAIN BEHAVIOR OF SKIN: ICD-10-CM

## 2018-05-10 DIAGNOSIS — L57.8 OTHER SKIN CHANGES DUE TO CHRONIC EXPOSURE TO NONIONIZING RADIATION: ICD-10-CM

## 2018-05-10 DIAGNOSIS — D18.0 HEMANGIOMA: ICD-10-CM

## 2018-05-10 DIAGNOSIS — L81.4 OTHER MELANIN HYPERPIGMENTATION: ICD-10-CM

## 2018-05-10 DIAGNOSIS — Z12.83 ENCOUNTER FOR SCREENING FOR MALIGNANT NEOPLASM OF SKIN: ICD-10-CM

## 2018-05-10 DIAGNOSIS — Z80.8 FAMILY HISTORY OF MALIGNANT NEOPLASM OF OTHER ORGANS OR SYSTEMS: ICD-10-CM

## 2018-05-10 DIAGNOSIS — E78.5 DYSLIPIDEMIA: ICD-10-CM

## 2018-05-10 DIAGNOSIS — L82.0 INFLAMED SEBORRHEIC KERATOSIS: ICD-10-CM

## 2018-05-10 DIAGNOSIS — Z85.828 PERSONAL HISTORY OF OTHER MALIGNANT NEOPLASM OF SKIN: ICD-10-CM

## 2018-05-10 DIAGNOSIS — D22 MELANOCYTIC NEVI: ICD-10-CM

## 2018-05-10 DIAGNOSIS — L57.0 ACTINIC KERATOSIS: ICD-10-CM

## 2018-05-10 DIAGNOSIS — L82.1 OTHER SEBORRHEIC KERATOSIS: ICD-10-CM

## 2018-05-10 PROBLEM — D17.22 BENIGN LIPOMATOUS NEOPLASM OF SKIN AND SUBCUTANEOUS TISSUE OF LEFT ARM: Status: ACTIVE | Noted: 2018-05-10

## 2018-05-10 PROBLEM — D48.5 NEOPLASM OF UNCERTAIN BEHAVIOR OF SKIN: Status: ACTIVE | Noted: 2018-05-10

## 2018-05-10 PROBLEM — D18.01 HEMANGIOMA OF SKIN AND SUBCUTANEOUS TISSUE: Status: ACTIVE | Noted: 2018-05-10

## 2018-05-10 PROBLEM — D22.4 MELANOCYTIC NEVI OF SCALP AND NECK: Status: ACTIVE | Noted: 2018-05-10

## 2018-05-10 PROBLEM — D17.24 BENIGN LIPOMATOUS NEOPLASM OF SKIN AND SUBCUTANEOUS TISSUE OF LEFT LEG: Status: ACTIVE | Noted: 2018-05-10

## 2018-05-10 LAB
ALBUMIN SERPL BCP-MCNC: 3.8 G/DL (ref 3.2–4.9)
ALBUMIN/GLOB SERPL: 1.4 G/DL
ALP SERPL-CCNC: 79 U/L (ref 30–99)
ALT SERPL-CCNC: 17 U/L (ref 2–50)
ANION GAP SERPL CALC-SCNC: 8 MMOL/L (ref 0–11.9)
AST SERPL-CCNC: 17 U/L (ref 12–45)
BILIRUB SERPL-MCNC: 0.8 MG/DL (ref 0.1–1.5)
BUN SERPL-MCNC: 12 MG/DL (ref 8–22)
CALCIUM SERPL-MCNC: 9 MG/DL (ref 8.5–10.5)
CHLORIDE SERPL-SCNC: 108 MMOL/L (ref 96–112)
CHOLEST SERPL-MCNC: 143 MG/DL (ref 100–199)
CK SERPL-CCNC: 72 U/L (ref 0–154)
CO2 SERPL-SCNC: 24 MMOL/L (ref 20–33)
CREAT SERPL-MCNC: 0.61 MG/DL (ref 0.5–1.4)
GLOBULIN SER CALC-MCNC: 2.8 G/DL (ref 1.9–3.5)
GLUCOSE SERPL-MCNC: 81 MG/DL (ref 65–99)
HDLC SERPL-MCNC: 51 MG/DL
LDLC SERPL CALC-MCNC: 71 MG/DL
POTASSIUM SERPL-SCNC: 3.9 MMOL/L (ref 3.6–5.5)
PROT SERPL-MCNC: 6.6 G/DL (ref 6–8.2)
SODIUM SERPL-SCNC: 140 MMOL/L (ref 135–145)
TRIGL SERPL-MCNC: 106 MG/DL (ref 0–149)
TSH SERPL DL<=0.005 MIU/L-ACNC: 2.4 UIU/ML (ref 0.38–5.33)

## 2018-05-10 PROCEDURE — 17003 DESTRUCT PREMALG LES 2-14: CPT

## 2018-05-10 PROCEDURE — ? BIOPSY BY SHAVE METHOD

## 2018-05-10 PROCEDURE — 17000 DESTRUCT PREMALG LESION: CPT | Mod: 59

## 2018-05-10 PROCEDURE — 84443 ASSAY THYROID STIM HORMONE: CPT

## 2018-05-10 PROCEDURE — 83695 ASSAY OF LIPOPROTEIN(A): CPT

## 2018-05-10 PROCEDURE — 82550 ASSAY OF CK (CPK): CPT

## 2018-05-10 PROCEDURE — 80061 LIPID PANEL: CPT

## 2018-05-10 PROCEDURE — ? LIQUID NITROGEN

## 2018-05-10 PROCEDURE — 99214 OFFICE O/P EST MOD 30 MIN: CPT | Mod: 25

## 2018-05-10 PROCEDURE — 36415 COLL VENOUS BLD VENIPUNCTURE: CPT

## 2018-05-10 PROCEDURE — 17110 DESTRUCTION B9 LES UP TO 14: CPT

## 2018-05-10 PROCEDURE — 11100: CPT | Mod: 59

## 2018-05-10 PROCEDURE — 80053 COMPREHEN METABOLIC PANEL: CPT

## 2018-05-10 PROCEDURE — ? COUNSELING

## 2018-05-10 ASSESSMENT — LOCATION DETAILED DESCRIPTION DERM
LOCATION DETAILED: RIGHT CENTRAL MALAR CHEEK
LOCATION DETAILED: MID-FRONTAL SCALP
LOCATION DETAILED: LEFT POSTERIOR SHOULDER
LOCATION DETAILED: LEFT INFERIOR CENTRAL MALAR CHEEK
LOCATION DETAILED: RIGHT INFERIOR MEDIAL FOREHEAD
LOCATION DETAILED: RIGHT DISTAL PRETIBIAL REGION
LOCATION DETAILED: RIGHT POSTERIOR SHOULDER
LOCATION DETAILED: LEFT ANTERIOR PROXIMAL THIGH
LOCATION DETAILED: RIGHT DISTAL POSTERIOR UPPER ARM
LOCATION DETAILED: LEFT PROXIMAL DORSAL FOREARM
LOCATION DETAILED: LEFT PROXIMAL POSTERIOR UPPER ARM
LOCATION DETAILED: RIGHT LATERAL SUPERIOR CHEST
LOCATION DETAILED: RIGHT INFERIOR CENTRAL MALAR CHEEK
LOCATION DETAILED: NASAL SUPRATIP
LOCATION DETAILED: RIGHT PROXIMAL PRETIBIAL REGION
LOCATION DETAILED: LEFT ANTERIOR SHOULDER
LOCATION DETAILED: LEFT INFERIOR ANTERIOR NECK
LOCATION DETAILED: LEFT CENTRAL MALAR CHEEK
LOCATION DETAILED: LEFT PROXIMAL PRETIBIAL REGION

## 2018-05-10 ASSESSMENT — LOCATION ZONE DERM
LOCATION ZONE: FACE
LOCATION ZONE: TRUNK
LOCATION ZONE: ARM
LOCATION ZONE: LEG
LOCATION ZONE: NECK
LOCATION ZONE: NOSE
LOCATION ZONE: SCALP

## 2018-05-10 ASSESSMENT — LOCATION SIMPLE DESCRIPTION DERM
LOCATION SIMPLE: LEFT SHOULDER
LOCATION SIMPLE: RIGHT SHOULDER
LOCATION SIMPLE: LEFT ANTERIOR NECK
LOCATION SIMPLE: RIGHT FOREHEAD
LOCATION SIMPLE: RIGHT POSTERIOR UPPER ARM
LOCATION SIMPLE: ANTERIOR SCALP
LOCATION SIMPLE: RIGHT PRETIBIAL REGION
LOCATION SIMPLE: LEFT POSTERIOR UPPER ARM
LOCATION SIMPLE: LEFT PRETIBIAL REGION
LOCATION SIMPLE: LEFT THIGH
LOCATION SIMPLE: NOSE
LOCATION SIMPLE: LEFT FOREARM
LOCATION SIMPLE: LEFT CHEEK
LOCATION SIMPLE: CHEST
LOCATION SIMPLE: RIGHT CHEEK

## 2018-05-10 NOTE — PROCEDURE: BIOPSY BY SHAVE METHOD
Dressing: Band-Aid
Hemostasis: Drysol and Electrocautery
Wound Care: Aquaphor
Biopsy Method: Personna blade
Notification Instructions: Patient will be notified of biopsy results; however, patient is instructed to call the office if not contacted within 2 weeks.
Bill For Surgical Tray: no
Post-Care Instructions: Keep the biopsy site dry overnight, then keep the site clean by washing with soap and water twice daily then covering with Vaseline/Aquaphor and a Band-Aid until healed.
Electrodesiccation And Curettage Text: The wound bed was treated with electrodesiccation and curettage after the biopsy was performed.
Size Of Lesion In Cm: 0.1
Detail Level: Detailed
Lab: [id number]
Biopsy Type: H and E
Silver Nitrate Text: The wound bed was treated with silver nitrate after the biopsy was performed.
Additional Anesthesia Volume In Cc (Will Not Render If 0): 0
Was A Bandage Applied: Yes
Cryotherapy Text: The wound bed was treated with cryotherapy after the biopsy was performed.
Curettage Text: The wound bed was treated with curettage after the biopsy was performed.
Electrodesiccation Text: The wound bed was treated with electrodesiccation after the biopsy was performed.
Type Of Destruction Used: Curettage
Lab Facility: 
Anesthesia Type: 1% lidocaine with 1:200,000 epinephrine and a 1:10 solution of 8.4% sodium bicarbonate and 408mcg clindamycin/ml
Anesthesia Volume In Cc: 0.5
Billing Type: Third-Party Bill
Consent: Written and verbal consent were obtained and risks were reviewed including but not limited to scarring, infection, bleeding, scabbing, incomplete removal, nerve damage and allergy to anesthesia.

## 2018-05-12 LAB — LPA SERPL-MCNC: 8 MG/DL

## 2018-05-18 ENCOUNTER — APPOINTMENT (OUTPATIENT)
Dept: VASCULAR LAB | Facility: MEDICAL CENTER | Age: 68
End: 2018-05-18
Payer: MEDICARE

## 2018-05-18 ENCOUNTER — OFFICE VISIT (OUTPATIENT)
Dept: VASCULAR LAB | Facility: MEDICAL CENTER | Age: 68
End: 2018-05-18
Attending: FAMILY MEDICINE
Payer: MEDICARE

## 2018-05-18 VITALS
HEART RATE: 70 BPM | DIASTOLIC BLOOD PRESSURE: 66 MMHG | BODY MASS INDEX: 23.48 KG/M2 | WEIGHT: 149.6 LBS | SYSTOLIC BLOOD PRESSURE: 117 MMHG | HEIGHT: 67 IN

## 2018-05-18 DIAGNOSIS — I72.8 SPLENIC ARTERY ANEURYSM (HCC): ICD-10-CM

## 2018-05-18 DIAGNOSIS — I65.23 CAROTID ATHEROSCLEROSIS, BILATERAL: ICD-10-CM

## 2018-05-18 DIAGNOSIS — E78.5 DYSLIPIDEMIA: ICD-10-CM

## 2018-05-18 PROCEDURE — 99212 OFFICE O/P EST SF 10 MIN: CPT

## 2018-05-18 PROCEDURE — 99214 OFFICE O/P EST MOD 30 MIN: CPT | Performed by: FAMILY MEDICINE

## 2018-05-18 ASSESSMENT — ENCOUNTER SYMPTOMS
MYALGIAS: 0
COUGH: 0
WHEEZING: 0
PALPITATIONS: 0
VOMITING: 0
FOCAL WEAKNESS: 0
FEVER: 0
WEAKNESS: 0
ABDOMINAL PAIN: 0
DIZZINESS: 0
SHORTNESS OF BREATH: 0
SEIZURES: 0
HEADACHES: 0
BRUISES/BLEEDS EASILY: 0
DIARRHEA: 0
TREMORS: 0
CHILLS: 0
NAUSEA: 0
HEMOPTYSIS: 0

## 2018-05-18 NOTE — PATIENT INSTRUCTIONS
1) cholesterol is great -- continue current medications     2) recheck labs in 1 year     3) recheck splenic artery in 2019    4) recheck carotid artery in 2020     5) follow-up in 1 year with labs

## 2018-05-18 NOTE — PROGRESS NOTES
FOLLOW-UP VASCULAR VISIT  Subjective:   Isabel Peralta is a 67 y.o. female who presents today 5/18/18 for   Chief Complaint   Patient presents with   • Follow-Up     carotid a. disease, HLD, splenic a. aneursym       HPI:  Last visit with [facility] med clinic on 3/21/18.     Started on Crestor 5mg 3x/week and continued zetia.  Pt reports no myalgias.  LDL decreased to 71 and TC = 143.  CMP and CPK normal.      No h/o tia or cva symptoms including presyncope, syncope or HA.  Was picked up on a vascular screening.     Splenic a. Aneurysm - denies LUQ pain or dullness, stable on prior serial exams.    Taking ASA w/o bleeding issues.     History   Smoking Status   • Former Smoker   • Packs/day: 0.50   • Years: 4.00   • Quit date: 1/1/1971   Smokeless Tobacco   • Never Used     Social History   Substance Use Topics   • Smoking status: Former Smoker     Packs/day: 0.50     Years: 4.00     Quit date: 1/1/1971   • Smokeless tobacco: Never Used   • Alcohol use No     Outpatient Encounter Prescriptions as of 5/18/2018   Medication Sig Dispense Refill   • rosuvastatin (CRESTOR) 5 MG Tab Take 1 Tab by mouth every evening. 30 Tab 11   • ezetimibe (ZETIA) 10 MG Tab Take 10 mg by mouth every day.     • ibuprofen (MOTRIN) 200 MG TABS Take 200 mg by mouth every 6 hours as needed.     • pantoprazole (PROTONIX) 40 MG TBEC Take 40 mg by mouth every day.     • estradiol (VAGIFEM) 10 MCG TABS Insert 10 mcg in vagina. Twice a week     • aspirin 81 MG tablet Take 81 mg by mouth every day.     • Cholecalciferol (VITAMIN D) 2000 UNITS CAPS Take  by mouth every day.     • Calcium-Magnesium-Vitamin D (CALCIUM 500 PO) Take 1,000 mg by mouth 2 Times a Day.     • triamcinolone (NASACORT) 55 MCG/ACT nasal inhaler Spray 1 Spray in nose every day.     • Probiotic Product (PROBIOTIC DAILY PO) Take  by mouth.       No facility-administered encounter medications on file as of 5/18/2018.      Allergies   Allergen Reactions   • Gluten Meal       "Belching, burping, bloating, \"stomach churning\"     DIET AND EXERCISE:  Weight Change: decreased 7lb intentionally since last visit   Diet: gluten-free with high volume veg/fruits due to celiac dz   Exercise: moderate regular exercise program     Review of Systems   Constitutional: Negative for chills, fever and malaise/fatigue.   Respiratory: Negative for cough, hemoptysis, shortness of breath and wheezing.    Cardiovascular: Negative for chest pain, palpitations and leg swelling.   Gastrointestinal: Negative for abdominal pain, diarrhea, nausea and vomiting.   Musculoskeletal: Negative for joint pain and myalgias.   Skin: Negative for itching and rash.   Neurological: Negative for dizziness, tremors, focal weakness, seizures, weakness and headaches.   Endo/Heme/Allergies: Does not bruise/bleed easily.        Objective:     Vitals:    05/18/18 1128   BP: 117/66   Pulse: 70   Weight: 67.9 kg (149 lb 9.6 oz)   Height: 1.689 m (5' 6.5\")      BP Readings from Last 4 Encounters:   05/18/18 117/66   03/21/18 144/71   01/10/18 126/76   11/03/17 122/80     Body mass index is 23.78 kg/m².  Physical Exam   Constitutional: She is oriented to person, place, and time. She appears well-developed and well-nourished. No distress.   HENT:   Head: Normocephalic and atraumatic.   Eyes: Conjunctivae and EOM are normal. Pupils are equal, round, and reactive to light. No scleral icterus.   Neck: Normal range of motion. Neck supple. No JVD present. No thyromegaly present.   Cardiovascular: Normal rate, regular rhythm, normal heart sounds and intact distal pulses.  Exam reveals no gallop and no friction rub.    No murmur heard.  Pulses:       Carotid pulses are 2+ on the right side, and 2+ on the left side.       Dorsalis pedis pulses are 2+ on the right side, and 2+ on the left side.        Posterior tibial pulses are 2+ on the right side, and 2+ on the left side.   No bruits appreciated in abdomen    Pulmonary/Chest: Effort normal and " breath sounds normal. No respiratory distress. She has no wheezes. She has no rales. She exhibits no tenderness.   Abdominal: Soft. Bowel sounds are normal. She exhibits no distension and no mass. There is no tenderness. There is no rebound and no guarding.   Musculoskeletal: Normal range of motion. She exhibits no edema or tenderness.   Neurological: She is alert and oriented to person, place, and time. She has normal reflexes. No cranial nerve deficit. Coordination normal.   Skin: Skin is warm and dry. No rash noted. She is not diaphoretic. No erythema. No pallor.   Psychiatric: She has a normal mood and affect.   Vitals reviewed.    Lab Results   Component Value Date    CHOLSTRLTOT 143 05/10/2018    LDL 71 05/10/2018    HDL 51 05/10/2018    TRIGLYCERIDE 106 05/10/2018           Lab Results   Component Value Date    SODIUM 140 05/10/2018    POTASSIUM 3.9 05/10/2018    CHLORIDE 108 05/10/2018    CO2 24 05/10/2018    GLUCOSE 81 05/10/2018    BUN 12 05/10/2018    CREATININE 0.61 05/10/2018    IFAFRICA >60 05/10/2018    IFNOTAFR >60 05/10/2018        Lab Results   Component Value Date    WBC 6.3 02/21/2017    RBC 4.58 02/21/2017    HEMOGLOBIN 14.4 02/21/2017    HEMATOCRIT 43.1 02/21/2017    MCV 94.1 02/21/2017    MCH 31.4 02/21/2017    MCHC 33.4 (L) 02/21/2017    MPV 10.5 02/21/2017      CTA abdomen aug 2016:  1.  There is no change in 2 small splenic artery aneurysms measuring 10-11 mm in diameter with minimal peripheral thrombus and some peripheral calcification.  2.  There is mild atherosclerosis of the abdominal aorta.  3.  There is normal variant branching at the celiac axis with the common hepatic artery originating directly from the abdominal aorta at the level of the celiac axis.  4.  There may be a small hiatal hernia again seen    Stress MPI Jan 2017:  NUCLEAR IMAGING INTERPRETATION   No evidence of significant jeopardized viable myocardium or prior myocardial    infarction.   Normal left ventricular wall  motion.  LV ejection fraction = 77%.   ECG INTERPRETATION   Negative stress ECG for ischemia.    Carotid duplex april 2017:  1.  There is a mild amount of atherosclerotic plaque.  Plaque is located in carotid bulbs and proximal internal and external carotid arteries.  Plaque characterization:  Calcific and irregular  2.  There is no evidence of carotid occlusion.  3. Vertebral arteries demonstrate antegrade flow.  4. Diameter reduction in the internal carotid arteries: less than 50%. There is no hemodynamically significant stenosis.    MRI head april 2017:  MRI of the brain without and with contrast within normal limits.    5/18/18:  No new imaging     Medical Decision Making:  Today's Assessment / Status / Plan:     1. Dyslipidemia     2. Carotid atherosclerosis, bilateral     3. Splenic artery aneurysm (HCC)       Patient Type: Primary Prevention.  Currently at optimized medical therapy with stable LDL, BP, weight, and tobacco avoidance.     Etiology of Established CVD if Present:   1) Mild carotid atherosclerosis, asymptomatic, no previous intervention  2) Splenic artery aneurysm - stable over time    Lipid Management: Qualifies for Statin Therapy Based on 2013 ACC/AHA Guidelines: yes  Calculated 10-Year Risk of ASCVD: 10.4%  Currently on Statin:  Crestor 5mg 3x/week  Given subclinical atherosclerosis and FH would like to treat as high risk with goal LDL-C <70 and nonHDL <100.  Lp(a) normal.   - currently at goal:  Yes   Plan:  - continue zetia  - continue crestor 5 mg 3x per week as she is at goal   - stop and call if any myalgias or abdominal symptoms  - recheck CMP, lipids in 1 year  - continue gluten-free diet    Blood Pressure Management: Goal <130/80 due to subclinical ASCVD  Home BP:  Stable, continue regular checks   Office BP:  Stable today, intermittent elevations in past   - continue home monitoring and call office if >140/90    Glycemic Status: Normal  - repeat annual glucose      Anti-Platelet/Anti-Coagulant Tx: yes  - continue low dose asa    Smoking: continue complete avoidance     Physical Activity: continue daily activity     Weight Management and Nutrition: Dietary plan was discussed with patient at this visit including continued celiac diet    Other:     1) Mild carotid atherosclerosis, asymptomatic, no previous intervention.  Continue medical management.  Repeat duplex every 3 years or so (next in 2020)    2) Splenic artery aneurysm - stable over time.  Repeat CTA every 3 yers or so (next likely in 2019)    Instructed to follow-up with PCP for remainder of adult medical needs: yes  We will partner with other providers in the management of established vascular disease and cardiometabolic risk factors.    Studies to Be Obtained: 1) CTA abdomen (2019), 2) carotid duplex (2020)  Labs to Be Obtained: CMP, lipid prior to next visit     Follow up in: 1 year     Hector Carrero M.D.      Cc:  ANJEL Bean MD

## 2018-05-24 DIAGNOSIS — E78.5 DYSLIPIDEMIA: ICD-10-CM

## 2018-05-24 RX ORDER — ROSUVASTATIN CALCIUM 5 MG/1
5 TABLET, COATED ORAL EVERY EVENING
Qty: 90 TAB | Refills: 4 | Status: SHIPPED | OUTPATIENT
Start: 2018-05-24 | End: 2019-12-03 | Stop reason: SDUPTHER

## 2018-05-24 RX ORDER — EZETIMIBE 10 MG/1
10 TABLET ORAL DAILY
Qty: 90 TAB | Refills: 3 | Status: SHIPPED | OUTPATIENT
Start: 2018-05-24 | End: 2019-07-29 | Stop reason: SDUPTHER

## 2018-06-07 ENCOUNTER — APPOINTMENT (RX ONLY)
Dept: URBAN - METROPOLITAN AREA CLINIC 20 | Facility: CLINIC | Age: 68
Setting detail: DERMATOLOGY
End: 2018-06-07

## 2018-06-07 PROBLEM — C44.619 BASAL CELL CARCINOMA OF SKIN OF LEFT UPPER LIMB, INCLUDING SHOULDER: Status: ACTIVE | Noted: 2018-06-07

## 2018-06-07 PROCEDURE — ? CURETTAGE AND DESTRUCTION

## 2018-06-07 PROCEDURE — ? COUNSELING

## 2018-06-07 PROCEDURE — 17261 DSTRJ MAL LES T/A/L .6-1.0CM: CPT

## 2018-06-07 NOTE — PROCEDURE: CURETTAGE AND DESTRUCTION
Bill For Surgical Tray: no
Size Of Lesion After Curettage: 0.9
Number Of Curettages: 3
Concentration (Mg/Ml Or Millions Of Plaque Forming Units/Cc): 0.01
Anesthesia Volume In Cc: 1
Bill As A Line Item Or As Units: Line Item
Cautery Type: electrodesiccation
Render Post-Care Instructions In Note?: yes
Anesthesia Type: 1% lidocaine with 1:100,000 epinephrine and 408mcg clindamycin/ml and a 1:10 solution of 8.4% sodium bicarbonate
Consent was obtained from the patient. The risks, benefits and alternatives to therapy were discussed in detail. Specifically, the risks of infection, scarring, bleeding, prolonged wound healing, nerve injury, incomplete removal, allergy to anesthesia and recurrence were addressed. Alternatives to ED&C were also discussed.  Prior to the procedure, the treatment site was clearly identified and confirmed by the patient. All components of Universal Protocol/PAUSE Rule completed.
Additional Information: (Optional): The wound was cleaned and a dressing was applied.
What Was Performed First?: Curettage
Size Of Lesion In Cm: 0.1
Post-Care Instructions: I reviewed with the patient in detail the post-care instructions. Patient is to keep the area dry for 24 hours.  Patent should keep the area clean with soap and water and apply Vaseline and a bandage BID until healed.  Should the patient develop any fevers, chills, bleeding or severe pain they will contact the office immediately.
Detail Level: Detailed

## 2018-06-07 NOTE — PROCEDURE: COUNSELING
Patient Specific Counseling (Will Not Stick From Patient To Patient): Nodular BCC.
Detail Level: Detailed

## 2018-07-12 ENCOUNTER — APPOINTMENT (RX ONLY)
Dept: URBAN - METROPOLITAN AREA CLINIC 20 | Facility: CLINIC | Age: 68
Setting detail: DERMATOLOGY
End: 2018-07-12

## 2018-07-12 DIAGNOSIS — D485 NEOPLASM OF UNCERTAIN BEHAVIOR OF SKIN: ICD-10-CM

## 2018-07-12 DIAGNOSIS — Z85.828 PERSONAL HISTORY OF OTHER MALIGNANT NEOPLASM OF SKIN: ICD-10-CM

## 2018-07-12 DIAGNOSIS — L82.0 INFLAMED SEBORRHEIC KERATOSIS: ICD-10-CM | Status: RESOLVED

## 2018-07-12 DIAGNOSIS — L57.0 ACTINIC KERATOSIS: ICD-10-CM | Status: RESOLVED

## 2018-07-12 PROBLEM — D48.5 NEOPLASM OF UNCERTAIN BEHAVIOR OF SKIN: Status: ACTIVE | Noted: 2018-07-12

## 2018-07-12 PROCEDURE — 99213 OFFICE O/P EST LOW 20 MIN: CPT | Mod: 25

## 2018-07-12 PROCEDURE — 11100: CPT

## 2018-07-12 PROCEDURE — ? COUNSELING

## 2018-07-12 PROCEDURE — ? BIOPSY BY SHAVE METHOD

## 2018-07-12 PROCEDURE — 11101: CPT

## 2018-07-12 ASSESSMENT — LOCATION ZONE DERM
LOCATION ZONE: NOSE
LOCATION ZONE: LEG
LOCATION ZONE: ARM

## 2018-07-12 ASSESSMENT — LOCATION DETAILED DESCRIPTION DERM
LOCATION DETAILED: LEFT PROXIMAL PRETIBIAL REGION
LOCATION DETAILED: LEFT POSTERIOR SHOULDER
LOCATION DETAILED: LEFT NASAL ALA

## 2018-07-12 ASSESSMENT — LOCATION SIMPLE DESCRIPTION DERM
LOCATION SIMPLE: LEFT SHOULDER
LOCATION SIMPLE: LEFT PRETIBIAL REGION
LOCATION SIMPLE: LEFT NOSE

## 2018-07-12 NOTE — PROCEDURE: BIOPSY BY SHAVE METHOD
Biopsy Method: Personna blade
Billing Type: Third-Party Bill
Bill For Surgical Tray: no
Wound Care: Aquaphor
Dressing: Band-Aid
Anesthesia Type: 1% lidocaine with epinephrine and a 1:10 solution of 8.4% sodium bicarbonate
Electrodesiccation Text: The wound bed was treated with electrodesiccation after the biopsy was performed.
Curettage Text: The wound bed was treated with curettage after the biopsy was performed.
Lab: [id number]
Cryotherapy Text: The wound bed was treated with cryotherapy after the biopsy was performed.
Size Of Lesion In Cm: 0.4
Detail Level: Detailed
Post-Care Instructions: Keep the biopsy site dry overnight, then keep the site clean by washing with soap and water twice daily then covering with Vaseline/Aquaphor and a Band-Aid until healed.
Hemostasis: Drysol and Electrocautery
Electrodesiccation And Curettage Text: The wound bed was treated with electrodesiccation and curettage after the biopsy was performed.
Type Of Destruction Used: Curettage
Notification Instructions: Patient will be notified of biopsy results; however, patient is instructed to call the office if not contacted within 2 weeks.
Additional Anesthesia Volume In Cc (Will Not Render If 0): 0
Consent: Written and verbal consent were obtained and risks were reviewed including but not limited to scarring, infection, bleeding, scabbing, incomplete removal, nerve damage and allergy to anesthesia.
Lab Facility: [id number]
Biopsy Type: H and E
X Size Of Lesion In Cm: 0.3
Depth Of Biopsy: dermis
Anesthesia Volume In Cc: 0.1
Silver Nitrate Text: The wound bed was treated with silver nitrate after the biopsy was performed.
Was A Bandage Applied: Yes
X Size Of Lesion In Cm: 0.2

## 2018-07-12 NOTE — PROCEDURE: COUNSELING
Detail Level: Generalized
Detail Level: Detailed
Patient Specific Counseling (Will Not Stick From Patient To Patient): Was a nodular BCC C&D'd at her last visit.

## 2018-08-27 ENCOUNTER — APPOINTMENT (RX ONLY)
Dept: URBAN - METROPOLITAN AREA CLINIC 20 | Facility: CLINIC | Age: 68
Setting detail: DERMATOLOGY
End: 2018-08-27

## 2018-08-27 DIAGNOSIS — D485 NEOPLASM OF UNCERTAIN BEHAVIOR OF SKIN: ICD-10-CM

## 2018-08-27 DIAGNOSIS — L57.0 ACTINIC KERATOSIS: ICD-10-CM | Status: IMPROVED

## 2018-08-27 PROBLEM — D48.5 NEOPLASM OF UNCERTAIN BEHAVIOR OF SKIN: Status: ACTIVE | Noted: 2018-08-27

## 2018-08-27 PROCEDURE — ? SEPARATE AND IDENTIFIABLE DOCUMENTATION

## 2018-08-27 PROCEDURE — 11101: CPT

## 2018-08-27 PROCEDURE — ? LIQUID NITROGEN

## 2018-08-27 PROCEDURE — 99213 OFFICE O/P EST LOW 20 MIN: CPT | Mod: 25

## 2018-08-27 PROCEDURE — 11100: CPT | Mod: 59

## 2018-08-27 PROCEDURE — ? COUNSELING

## 2018-08-27 PROCEDURE — 17000 DESTRUCT PREMALG LESION: CPT

## 2018-08-27 PROCEDURE — ? BIOPSY BY SHAVE METHOD

## 2018-08-27 ASSESSMENT — LOCATION DETAILED DESCRIPTION DERM
LOCATION DETAILED: LEFT NASAL ALA
LOCATION DETAILED: LEFT PROXIMAL PRETIBIAL REGION
LOCATION DETAILED: RIGHT ANTERIOR PROXIMAL UPPER ARM

## 2018-08-27 ASSESSMENT — LOCATION ZONE DERM
LOCATION ZONE: NOSE
LOCATION ZONE: LEG
LOCATION ZONE: ARM

## 2018-08-27 ASSESSMENT — LOCATION SIMPLE DESCRIPTION DERM
LOCATION SIMPLE: LEFT NOSE
LOCATION SIMPLE: RIGHT UPPER ARM
LOCATION SIMPLE: LEFT PRETIBIAL REGION

## 2018-08-27 NOTE — PROCEDURE: LIQUID NITROGEN
Consent: The patient's consent was obtained including but not limited to risks of crusting, scabbing, blistering, scarring, darker or lighter pigmentary change, recurrence, incomplete removal and infection.
Detail Level: Detailed
Render Post-Care Instructions In Note?: yes
Duration Of Freeze Thaw-Cycle (Seconds): 0
Post-Care Instructions: I reviewed with the patient in detail post-care instructions. Patient is to wear sun protection and avoid picking at any of the treated lesions. Pt may apply Vaseline to crusted or scabbing areas.

## 2018-08-27 NOTE — PROCEDURE: COUNSELING
Detail Level: Detailed
Patient Specific Counseling (Will Not Stick From Patient To Patient): Biopsy-proven Atypical Squamous Proliferation on the left proximal pretibial region with a Ddx of follicular infundibular cyst with focal keratinocytic atypia vs cystic SCC; needed re-biopsy today.
Detail Level: Generalized
Patient Specific Counseling (Will Not Stick From Patient To Patient): Biopsy-proven AK, still present.

## 2018-08-27 NOTE — PROCEDURE: BIOPSY BY SHAVE METHOD
Electrodesiccation And Curettage Text: The wound bed was treated with electrodesiccation and curettage after the biopsy was performed.
X Size Of Lesion In Cm: 0.3
Notification Instructions: Patient will be notified of biopsy results; however, patient is instructed to call the office if not contacted within 2 weeks.
Consent: Written and verbal consent were obtained and risks were reviewed including but not limited to scarring, infection, bleeding, scabbing, incomplete removal, nerve damage and allergy to anesthesia.
Detail Level: Detailed
Hemostasis: Drysol and Electrocautery
Curettage Text: The wound bed was treated with curettage after the biopsy was performed.
Dressing: Band-Aid
Biopsy Method: Personna blade
Size Of Lesion In Cm: 0.4
Path Notes (To The Dermatopathologist): Re-biopsy
Depth Of Biopsy: dermis
Bill 27103 For Specimen Handling/Conveyance To Laboratory?: no
Was A Bandage Applied: Yes
Post-Care Instructions: Keep the biopsy site dry overnight, then keep the site clean by washing with soap and water twice daily then covering with Vaseline/Aquaphor and a Band-Aid until healed.
Lab Facility: [id number]
Wound Care: Aquaphor
Type Of Destruction Used: Curettage
Electrodesiccation Text: The wound bed was treated with electrodesiccation after the biopsy was performed.
Additional Anesthesia Volume In Cc (Will Not Render If 0): 0
Silver Nitrate Text: The wound bed was treated with silver nitrate after the biopsy was performed.
Cryotherapy Text: The wound bed was treated with cryotherapy after the biopsy was performed.
Anesthesia Type: 1% lidocaine with epinephrine and a 1:10 solution of 8.4% sodium bicarbonate
Lab: [id number]
Anesthesia Volume In Cc: 0.1
Biopsy Type: H and E
Billing Type: Third-Party Bill
Size Of Lesion In Cm: 1.2
X Size Of Lesion In Cm: 0.8

## 2018-10-01 ENCOUNTER — IMMUNIZATION (OUTPATIENT)
Dept: SOCIAL WORK | Facility: CLINIC | Age: 68
End: 2018-10-01
Payer: MEDICARE

## 2018-10-01 DIAGNOSIS — Z23 NEED FOR VACCINATION: ICD-10-CM

## 2018-10-01 PROCEDURE — 90662 IIV NO PRSV INCREASED AG IM: CPT | Performed by: REGISTERED NURSE

## 2018-10-01 PROCEDURE — G0008 ADMIN INFLUENZA VIRUS VAC: HCPCS | Performed by: REGISTERED NURSE

## 2018-11-09 ENCOUNTER — HOSPITAL ENCOUNTER (OUTPATIENT)
Dept: LAB | Facility: MEDICAL CENTER | Age: 68
End: 2018-11-09
Attending: FAMILY MEDICINE
Payer: MEDICARE

## 2018-11-09 LAB
25(OH)D3 SERPL-MCNC: 53 NG/ML (ref 30–100)
ALBUMIN SERPL BCP-MCNC: 4 G/DL (ref 3.2–4.9)
ALBUMIN/GLOB SERPL: 1.5 G/DL
ALP SERPL-CCNC: 86 U/L (ref 30–99)
ALT SERPL-CCNC: 15 U/L (ref 2–50)
ANION GAP SERPL CALC-SCNC: 7 MMOL/L (ref 0–11.9)
AST SERPL-CCNC: 17 U/L (ref 12–45)
BASOPHILS # BLD AUTO: 0.7 % (ref 0–1.8)
BASOPHILS # BLD: 0.04 K/UL (ref 0–0.12)
BILIRUB SERPL-MCNC: 0.5 MG/DL (ref 0.1–1.5)
BUN SERPL-MCNC: 16 MG/DL (ref 8–22)
CALCIUM SERPL-MCNC: 9.5 MG/DL (ref 8.5–10.5)
CHLORIDE SERPL-SCNC: 109 MMOL/L (ref 96–112)
CHOLEST SERPL-MCNC: 143 MG/DL (ref 100–199)
CO2 SERPL-SCNC: 24 MMOL/L (ref 20–33)
CREAT SERPL-MCNC: 0.67 MG/DL (ref 0.5–1.4)
EOSINOPHIL # BLD AUTO: 0.12 K/UL (ref 0–0.51)
EOSINOPHIL NFR BLD: 2.1 % (ref 0–6.9)
ERYTHROCYTE [DISTWIDTH] IN BLOOD BY AUTOMATED COUNT: 45.8 FL (ref 35.9–50)
FASTING STATUS PATIENT QL REPORTED: NORMAL
GLOBULIN SER CALC-MCNC: 2.7 G/DL (ref 1.9–3.5)
GLUCOSE SERPL-MCNC: 80 MG/DL (ref 65–99)
HCT VFR BLD AUTO: 43.2 % (ref 37–47)
HDLC SERPL-MCNC: 51 MG/DL
HGB BLD-MCNC: 14.5 G/DL (ref 12–16)
IMM GRANULOCYTES # BLD AUTO: 0.01 K/UL (ref 0–0.11)
IMM GRANULOCYTES NFR BLD AUTO: 0.2 % (ref 0–0.9)
LDLC SERPL CALC-MCNC: 69 MG/DL
LYMPHOCYTES # BLD AUTO: 2.1 K/UL (ref 1–4.8)
LYMPHOCYTES NFR BLD: 36.4 % (ref 22–41)
MCH RBC QN AUTO: 31.8 PG (ref 27–33)
MCHC RBC AUTO-ENTMCNC: 33.6 G/DL (ref 33.6–35)
MCV RBC AUTO: 94.7 FL (ref 81.4–97.8)
MONOCYTES # BLD AUTO: 0.42 K/UL (ref 0–0.85)
MONOCYTES NFR BLD AUTO: 7.3 % (ref 0–13.4)
NEUTROPHILS # BLD AUTO: 3.08 K/UL (ref 2–7.15)
NEUTROPHILS NFR BLD: 53.3 % (ref 44–72)
NRBC # BLD AUTO: 0 K/UL
NRBC BLD-RTO: 0 /100 WBC
PLATELET # BLD AUTO: 275 K/UL (ref 164–446)
PMV BLD AUTO: 10.8 FL (ref 9–12.9)
POTASSIUM SERPL-SCNC: 4 MMOL/L (ref 3.6–5.5)
PROT SERPL-MCNC: 6.7 G/DL (ref 6–8.2)
RBC # BLD AUTO: 4.56 M/UL (ref 4.2–5.4)
SODIUM SERPL-SCNC: 140 MMOL/L (ref 135–145)
T4 FREE SERPL-MCNC: 0.85 NG/DL (ref 0.53–1.43)
TRIGL SERPL-MCNC: 113 MG/DL (ref 0–149)
TSH SERPL DL<=0.005 MIU/L-ACNC: 2.33 UIU/ML (ref 0.38–5.33)
WBC # BLD AUTO: 5.8 K/UL (ref 4.8–10.8)

## 2018-11-09 PROCEDURE — 85025 COMPLETE CBC W/AUTO DIFF WBC: CPT

## 2018-11-09 PROCEDURE — 80061 LIPID PANEL: CPT

## 2018-11-09 PROCEDURE — 36415 COLL VENOUS BLD VENIPUNCTURE: CPT

## 2018-11-09 PROCEDURE — 84439 ASSAY OF FREE THYROXINE: CPT

## 2018-11-09 PROCEDURE — 80053 COMPREHEN METABOLIC PANEL: CPT

## 2018-11-09 PROCEDURE — 82306 VITAMIN D 25 HYDROXY: CPT

## 2018-11-09 PROCEDURE — 84443 ASSAY THYROID STIM HORMONE: CPT

## 2019-02-26 ENCOUNTER — TELEPHONE (OUTPATIENT)
Dept: VASCULAR LAB | Facility: MEDICAL CENTER | Age: 69
End: 2019-02-26

## 2019-02-26 DIAGNOSIS — I72.8 SPLENIC ARTERY ANEURYSM (HCC): ICD-10-CM

## 2019-04-04 ENCOUNTER — HOSPITAL ENCOUNTER (OUTPATIENT)
Dept: LAB | Facility: MEDICAL CENTER | Age: 69
End: 2019-04-04
Attending: NURSE PRACTITIONER
Payer: MEDICARE

## 2019-04-04 DIAGNOSIS — I72.8 SPLENIC ARTERY ANEURYSM (HCC): ICD-10-CM

## 2019-04-04 LAB
ANION GAP SERPL CALC-SCNC: 6 MMOL/L (ref 0–11.9)
BUN SERPL-MCNC: 15 MG/DL (ref 8–22)
CALCIUM SERPL-MCNC: 9.4 MG/DL (ref 8.5–10.5)
CHLORIDE SERPL-SCNC: 106 MMOL/L (ref 96–112)
CO2 SERPL-SCNC: 25 MMOL/L (ref 20–33)
CREAT SERPL-MCNC: 0.67 MG/DL (ref 0.5–1.4)
FASTING STATUS PATIENT QL REPORTED: NORMAL
GLUCOSE SERPL-MCNC: 82 MG/DL (ref 65–99)
POTASSIUM SERPL-SCNC: 4 MMOL/L (ref 3.6–5.5)
SODIUM SERPL-SCNC: 137 MMOL/L (ref 135–145)

## 2019-04-04 PROCEDURE — 80048 BASIC METABOLIC PNL TOTAL CA: CPT

## 2019-04-04 PROCEDURE — 36415 COLL VENOUS BLD VENIPUNCTURE: CPT

## 2019-04-08 ENCOUNTER — HOSPITAL ENCOUNTER (OUTPATIENT)
Dept: RADIOLOGY | Facility: MEDICAL CENTER | Age: 69
End: 2019-04-08
Attending: NURSE PRACTITIONER
Payer: MEDICARE

## 2019-04-08 DIAGNOSIS — I72.8 SPLENIC ARTERY ANEURYSM (HCC): ICD-10-CM

## 2019-04-08 PROCEDURE — 74175 CTA ABDOMEN W/CONTRAST: CPT

## 2019-04-08 PROCEDURE — 700117 HCHG RX CONTRAST REV CODE 255: Performed by: NURSE PRACTITIONER

## 2019-04-08 RX ADMIN — IOHEXOL 100 ML: 350 INJECTION, SOLUTION INTRAVENOUS at 11:20

## 2019-04-09 ENCOUNTER — TELEPHONE (OUTPATIENT)
Dept: VASCULAR LAB | Facility: MEDICAL CENTER | Age: 69
End: 2019-04-09

## 2019-04-09 NOTE — TELEPHONE ENCOUNTER
CTA reviewed  No change in small splenic artery aneurysms  STEMI patient still asymptomatic we will continue medical management.  Probably would not need a repeat CTA for another 3 years or so.  We will not go on surveillance counter as this is not an imminent study    Michael Bloch, MD  Vascular Medicine

## 2019-04-09 NOTE — TELEPHONE ENCOUNTER
Spoke to Pt about Dr. Bloch reviewing her CT and there are no changes in the appearance of her small splenic artery aneurysms.  She should follow-up as scheduled with Dr. Carrero in a couple months.  We will probably repeat CT scan in about 3 years

## 2019-05-08 ENCOUNTER — HOSPITAL ENCOUNTER (OUTPATIENT)
Dept: RADIOLOGY | Facility: MEDICAL CENTER | Age: 69
End: 2019-05-08
Attending: SPECIALIST
Payer: MEDICARE

## 2019-05-08 DIAGNOSIS — Z12.31 ENCOUNTER FOR SCREENING MAMMOGRAM FOR MALIGNANT NEOPLASM OF BREAST: ICD-10-CM

## 2019-05-08 PROCEDURE — 77063 BREAST TOMOSYNTHESIS BI: CPT

## 2019-05-23 ENCOUNTER — OFFICE VISIT (OUTPATIENT)
Dept: VASCULAR LAB | Facility: MEDICAL CENTER | Age: 69
End: 2019-05-23
Attending: FAMILY MEDICINE
Payer: MEDICARE

## 2019-05-23 VITALS
WEIGHT: 141.6 LBS | DIASTOLIC BLOOD PRESSURE: 70 MMHG | SYSTOLIC BLOOD PRESSURE: 125 MMHG | HEIGHT: 67 IN | BODY MASS INDEX: 22.22 KG/M2 | HEART RATE: 70 BPM

## 2019-05-23 DIAGNOSIS — Z79.02 LONG TERM (CURRENT) USE OF ANTITHROMBOTICS/ANTIPLATELETS: ICD-10-CM

## 2019-05-23 DIAGNOSIS — E78.5 DYSLIPIDEMIA: ICD-10-CM

## 2019-05-23 DIAGNOSIS — K90.0 CELIAC DISEASE: ICD-10-CM

## 2019-05-23 DIAGNOSIS — I65.23 CAROTID ATHEROSCLEROSIS, BILATERAL: ICD-10-CM

## 2019-05-23 DIAGNOSIS — I72.8 SPLENIC ARTERY ANEURYSM (HCC): ICD-10-CM

## 2019-05-23 PROCEDURE — 99212 OFFICE O/P EST SF 10 MIN: CPT | Performed by: FAMILY MEDICINE

## 2019-05-23 PROCEDURE — 99214 OFFICE O/P EST MOD 30 MIN: CPT | Performed by: FAMILY MEDICINE

## 2019-05-23 ASSESSMENT — ENCOUNTER SYMPTOMS
FEVER: 0
COUGH: 0
BRUISES/BLEEDS EASILY: 0
HEMOPTYSIS: 0
ABDOMINAL PAIN: 0
NAUSEA: 0
WEAKNESS: 0
HEADACHES: 0
DIZZINESS: 0
CHILLS: 0
PALPITATIONS: 0
WHEEZING: 0
DIARRHEA: 0
SEIZURES: 0
MYALGIAS: 0
FOCAL WEAKNESS: 0
TREMORS: 0
SHORTNESS OF BREATH: 0
VOMITING: 0

## 2019-05-23 NOTE — PROGRESS NOTES
FOLLOW-UP VASCULAR VISIT  Subjective:   Isabel Peralta is a 67 y.o. female who presents today 5/23/19 for   Chief Complaint   Patient presents with   • Follow-Up     Dyslipidemia       HPI:    Hyperlipidemia:    Stable, no current concerns  Current treatment: rosuva 5mg daily, zetia   Myalgias? No  Other adverse drug reactions? No  Lipid profile:     Lab Results  Component Value Date/Time   CHOLSTRLTOT 143 11/09/2018 0949   TRIGLYCERIDE 113 11/09/2018 0949   HDL 51 11/09/2018 0949   LDL 69 11/09/2018 0949     LDL (mg/dL)   Date Value   11/09/2018 69   05/10/2018 71     HDL (mg/dL)   Date Value   11/09/2018 51   05/10/2018 51     Carotid artery disease:   No h/o tia or cva symptoms including presyncope, syncope or HA.    Splenic a. Aneurysm - denies LUQ pain or dullness, stable on prior serial exams.    Antiplat: Taking ASA w/o bleeding issues.     History   Smoking Status   • Former Smoker   • Packs/day: 0.50   • Years: 4.00   • Quit date: 1/1/1971   Smokeless Tobacco   • Never Used     Social History   Substance Use Topics   • Smoking status: Former Smoker     Packs/day: 0.50     Years: 4.00     Quit date: 1/1/1971   • Smokeless tobacco: Never Used   • Alcohol use No     Outpatient Encounter Prescriptions as of 5/23/2019   Medication Sig Dispense Refill   • rosuvastatin (CRESTOR) 5 MG Tab Take 1 Tab by mouth every evening. 90 Tab 4   • ezetimibe (ZETIA) 10 MG Tab Take 1 Tab by mouth every day. 90 Tab 3   • ibuprofen (MOTRIN) 200 MG TABS Take 200 mg by mouth every 6 hours as needed.     • pantoprazole (PROTONIX) 40 MG TBEC Take 40 mg by mouth every day.     • estradiol (VAGIFEM) 10 MCG TABS Insert 10 mcg in vagina. Twice a week     • aspirin 81 MG tablet Take 81 mg by mouth every day.     • Cholecalciferol (VITAMIN D) 2000 UNITS CAPS Take  by mouth every day.     • Calcium-Magnesium-Vitamin D (CALCIUM 500 PO) Take 1,000 mg by mouth 2 Times a Day.     • triamcinolone (NASACORT) 55 MCG/ACT nasal inhaler  "Spray 1 Spray in nose every day.     • Probiotic Product (PROBIOTIC DAILY PO) Take  by mouth.       No facility-administered encounter medications on file as of 5/23/2019.      Allergies   Allergen Reactions   • Gluten Meal      Belching, burping, bloating, \"stomach churning\"     DIET AND EXERCISE:  Weight Change: continued decrease  BMI Readings from Last 4 Encounters:   05/23/19 22.51 kg/m²   05/18/18 23.78 kg/m²   03/21/18 24.87 kg/m²   01/10/18 23.98 kg/m²      Diet: gluten-free with high volume veg/fruits due to celiac dz   Exercise: moderate regular exercise program     Review of Systems   Constitutional: Negative for chills, fever and malaise/fatigue.   Respiratory: Negative for cough, hemoptysis, shortness of breath and wheezing.    Cardiovascular: Negative for chest pain, palpitations and leg swelling.   Gastrointestinal: Negative for abdominal pain, diarrhea, nausea and vomiting.   Musculoskeletal: Negative for joint pain and myalgias.   Skin: Negative for itching and rash.   Neurological: Negative for dizziness, tremors, focal weakness, seizures, weakness and headaches.   Endo/Heme/Allergies: Does not bruise/bleed easily.        Objective:     Vitals:    05/23/19 1109   BP: 125/70   BP Location: Left arm   Patient Position: Sitting   BP Cuff Size: Adult   Pulse: 70   Weight: 64.2 kg (141 lb 9.6 oz)   Height: 1.689 m (5' 6.5\")      BP Readings from Last 4 Encounters:   05/23/19 125/70   05/18/18 117/66   03/21/18 144/71   01/10/18 126/76     Body mass index is 22.51 kg/m².  Physical Exam   Constitutional: She is oriented to person, place, and time. She appears well-developed and well-nourished. No distress.   HENT:   Head: Normocephalic and atraumatic.   Eyes: Pupils are equal, round, and reactive to light. Conjunctivae and EOM are normal. No scleral icterus.   Neck: Normal range of motion. Neck supple. No JVD present. No thyromegaly present.   Cardiovascular: Normal rate, regular rhythm, normal heart " sounds and intact distal pulses.  Exam reveals no gallop and no friction rub.    No murmur heard.  Pulses:       Carotid pulses are 2+ on the right side, and 2+ on the left side.       Dorsalis pedis pulses are 2+ on the right side, and 2+ on the left side.        Posterior tibial pulses are 2+ on the right side, and 2+ on the left side.   No bruits appreciated in abdomen    Pulmonary/Chest: Effort normal and breath sounds normal. No respiratory distress. She has no wheezes. She has no rales. She exhibits no tenderness.   Abdominal: Soft. Bowel sounds are normal. She exhibits no distension and no mass. There is no tenderness. There is no rebound and no guarding.   Musculoskeletal: Normal range of motion. She exhibits no edema or tenderness.   Neurological: She is alert and oriented to person, place, and time. She has normal reflexes. No cranial nerve deficit. Coordination normal.   Skin: Skin is warm and dry. No rash noted. She is not diaphoretic. No erythema. No pallor.   Psychiatric: She has a normal mood and affect.   Vitals reviewed.    Lab Results   Component Value Date    CHOLSTRLTOT 143 11/09/2018    LDL 69 11/09/2018    HDL 51 11/09/2018    TRIGLYCERIDE 113 11/09/2018           Lab Results   Component Value Date    SODIUM 137 04/04/2019    POTASSIUM 4.0 04/04/2019    CHLORIDE 106 04/04/2019    CO2 25 04/04/2019    GLUCOSE 82 04/04/2019    BUN 15 04/04/2019    CREATININE 0.67 04/04/2019    IF[place] >60 04/04/2019    IFNOTAFR >60 04/04/2019        Lab Results   Component Value Date    WBC 5.8 11/09/2018    RBC 4.56 11/09/2018    HEMOGLOBIN 14.5 11/09/2018    HEMATOCRIT 43.2 11/09/2018    MCV 94.7 11/09/2018    MCH 31.8 11/09/2018    MCHC 33.6 11/09/2018    MPV 10.8 11/09/2018      CTA abdomen aug 2016:  1.  There is no change in 2 small splenic artery aneurysms measuring 10-11 mm in diameter with minimal peripheral thrombus and some peripheral calcification.  2.  There is mild atherosclerosis of the abdominal  aorta.  3.  There is normal variant branching at the celiac axis with the common hepatic artery originating directly from the abdominal aorta at the level of the celiac axis.  4.  There may be a small hiatal hernia again seen    Stress MPI Jan 2017:  NUCLEAR IMAGING INTERPRETATION   No evidence of significant jeopardized viable myocardium or prior myocardial    infarction.   Normal left ventricular wall motion.  LV ejection fraction = 77%.   ECG INTERPRETATION   Negative stress ECG for ischemia.    Carotid duplex april 2017:  1.  There is a mild amount of atherosclerotic plaque.  Plaque is located in carotid bulbs and proximal internal and external carotid arteries.  Plaque characterization:  Calcific and irregular  2.  There is no evidence of carotid occlusion.  3. Vertebral arteries demonstrate antegrade flow.  4. Diameter reduction in the internal carotid arteries: less than 50%. There is no hemodynamically significant stenosis.    MRI head april 2017:  MRI of the brain without and with contrast within normal limits.    CTA abd/pelvis 4/8/19  1.  There is no interval change in 2 small splenic artery aneurysms each measuring 10-11 mm in diameter with minimal peripheral thrombus and calcification.  2.  There is mild atherosclerosis of the abdominal aorta.  3.  There are no suspicious parenchymal organ findings.  Variant anatomy with the common hepatic artery originating directly from the aorta is again noted. Celiac axis and SMA are patent. CYNTHIA is patent. Both renal arteries are patent.    Medical Decision Making:  Today's Assessment / Status / Plan:     1. Dyslipidemia  Comp Metabolic Panel    Lipid Profile   2. Splenic artery aneurysm (HCC)     3. Carotid atherosclerosis, bilateral     4. Long term (current) use of antithrombotics/antiplatelets     5. Celiac disease       Patient Type: Primary Prevention.    Etiology of Established CVD if Present:   1) Mild carotid atherosclerosis, asymptomatic, no previous  intervention  2) Splenic artery aneurysm - stable over time    Lipid Management: Qualifies for Statin Therapy Based on 2013 ACC/AHA Guidelines: yes  Calculated 10-Year Risk of ASCVD: 10.4%  Currently on Statin:  Crestor 5mg 3x/week  Given subclinical atherosclerosis and FH would like to treat as high risk with goal LDL-C <70 and nonHDL <100.    Lp(a) normal.   - currently at goal:  Yes   Plan:  - continue zetia 10mg daily   - continue crestor 5 mg 3x per week as she is at goal   - stop and call if any myalgias or abdominal symptoms  - continue gluten-free diet    Blood Pressure Management: ACC/AHA (2017) goal <130/80  Home BP at goal: yes  Office BP at goal:  yes  Echo: not done   ACR: note done   Plan:   Monitoring:   - continue home BP monitoring, reviewed correct technique:  Yes   - order 24h ABPM:  NO  Medications: no meds indicated at this time     Glycemic Status: Normal    Anti-Platelet/Anti-Coagulant Tx: yes  - continue low dose asa    Smoking: continued complete avoidance of all tobacco products     Physical Activity: continue healthy activity to improve CV fitness, see care instructions for additional details     Weight Management and Nutrition: Dietary plan was discussed with patient at this visit including DASH, low sodium and/or as outlined in care instructions       Other:     1) Mild carotid atherosclerosis, asymptomatic, no previous intervention.  - Continue medical management.    - Repeat duplex every 3 years or so (next in 2020) or with symptoms     2) Splenic artery aneurysm - stable over time.    - Repeat CTA every 3 yrs (2022)    Instructed to follow-up with PCP for remainder of adult medical needs: yes  We will partner with other providers in the management of established vascular disease and cardiometabolic risk factors.    Studies to Be Obtained: 1) CTA abdomen (4/2022), 2) carotid duplex (4/2020)  Labs to Be Obtained: cmp, lipid in Nov     Follow up in: 1 year with LYUDMILA Carrero,  M.D.      Cc:  ANJEL Bean MD

## 2019-07-03 ENCOUNTER — APPOINTMENT (RX ONLY)
Dept: URBAN - METROPOLITAN AREA CLINIC 35 | Facility: CLINIC | Age: 69
Setting detail: DERMATOLOGY
End: 2019-07-03

## 2019-07-03 DIAGNOSIS — L91.8 OTHER HYPERTROPHIC DISORDERS OF THE SKIN: ICD-10-CM

## 2019-07-03 DIAGNOSIS — D17 BENIGN LIPOMATOUS NEOPLASM: ICD-10-CM

## 2019-07-03 DIAGNOSIS — L82.1 OTHER SEBORRHEIC KERATOSIS: ICD-10-CM

## 2019-07-03 DIAGNOSIS — Z85.828 PERSONAL HISTORY OF OTHER MALIGNANT NEOPLASM OF SKIN: ICD-10-CM

## 2019-07-03 DIAGNOSIS — Z71.89 OTHER SPECIFIED COUNSELING: ICD-10-CM

## 2019-07-03 DIAGNOSIS — D18.0 HEMANGIOMA: ICD-10-CM

## 2019-07-03 DIAGNOSIS — Z87.2 PERSONAL HISTORY OF DISEASES OF THE SKIN AND SUBCUTANEOUS TISSUE: ICD-10-CM

## 2019-07-03 DIAGNOSIS — D69.2 OTHER NONTHROMBOCYTOPENIC PURPURA: ICD-10-CM

## 2019-07-03 DIAGNOSIS — L81.4 OTHER MELANIN HYPERPIGMENTATION: ICD-10-CM

## 2019-07-03 DIAGNOSIS — L57.0 ACTINIC KERATOSIS: ICD-10-CM

## 2019-07-03 PROBLEM — D17.22 BENIGN LIPOMATOUS NEOPLASM OF SKIN AND SUBCUTANEOUS TISSUE OF LEFT ARM: Status: ACTIVE | Noted: 2019-07-03

## 2019-07-03 PROBLEM — D17.23 BENIGN LIPOMATOUS NEOPLASM OF SKIN AND SUBCUTANEOUS TISSUE OF RIGHT LEG: Status: ACTIVE | Noted: 2019-07-03

## 2019-07-03 PROBLEM — D23.5 OTHER BENIGN NEOPLASM OF SKIN OF TRUNK: Status: ACTIVE | Noted: 2019-07-03

## 2019-07-03 PROBLEM — D17.21 BENIGN LIPOMATOUS NEOPLASM OF SKIN AND SUBCUTANEOUS TISSUE OF RIGHT ARM: Status: ACTIVE | Noted: 2019-07-03

## 2019-07-03 PROBLEM — D17.24 BENIGN LIPOMATOUS NEOPLASM OF SKIN AND SUBCUTANEOUS TISSUE OF LEFT LEG: Status: ACTIVE | Noted: 2019-07-03

## 2019-07-03 PROBLEM — D18.01 HEMANGIOMA OF SKIN AND SUBCUTANEOUS TISSUE: Status: ACTIVE | Noted: 2019-07-03

## 2019-07-03 PROCEDURE — ? LIQUID NITROGEN

## 2019-07-03 PROCEDURE — ? COUNSELING

## 2019-07-03 PROCEDURE — 17000 DESTRUCT PREMALG LESION: CPT

## 2019-07-03 PROCEDURE — 17003 DESTRUCT PREMALG LES 2-14: CPT

## 2019-07-03 PROCEDURE — 99214 OFFICE O/P EST MOD 30 MIN: CPT | Mod: 25

## 2019-07-03 ASSESSMENT — LOCATION SIMPLE DESCRIPTION DERM
LOCATION SIMPLE: LEFT SHOULDER
LOCATION SIMPLE: LEFT CHEEK
LOCATION SIMPLE: LEFT INFERIOR EYELID
LOCATION SIMPLE: ABDOMEN
LOCATION SIMPLE: RIGHT THIGH
LOCATION SIMPLE: LEFT FOREARM
LOCATION SIMPLE: LEFT ELBOW
LOCATION SIMPLE: LEFT PRETIBIAL REGION
LOCATION SIMPLE: RIGHT INFERIOR EYELID
LOCATION SIMPLE: RIGHT LIP
LOCATION SIMPLE: LEFT EYELID
LOCATION SIMPLE: LEFT THIGH
LOCATION SIMPLE: CHEST
LOCATION SIMPLE: LEFT UPPER BACK
LOCATION SIMPLE: RIGHT HAND
LOCATION SIMPLE: RIGHT FOREARM

## 2019-07-03 ASSESSMENT — LOCATION DETAILED DESCRIPTION DERM
LOCATION DETAILED: LEFT INFERIOR UPPER BACK
LOCATION DETAILED: LEFT INFERIOR MEDIAL MALAR CHEEK
LOCATION DETAILED: LEFT PROXIMAL DORSAL FOREARM
LOCATION DETAILED: LEFT CENTRAL MALAR CHEEK
LOCATION DETAILED: LEFT ANTERIOR PROXIMAL THIGH
LOCATION DETAILED: RIGHT ANTERIOR DISTAL THIGH
LOCATION DETAILED: LEFT MID-UPPER BACK
LOCATION DETAILED: LEFT POSTERIOR SHOULDER
LOCATION DETAILED: RIGHT MEDIAL SUPERIOR CHEST
LOCATION DETAILED: RIGHT MEDIAL INFERIOR EYELID
LOCATION DETAILED: LEFT MEDIAL CANTHUS
LOCATION DETAILED: RIGHT DISTAL DORSAL FOREARM
LOCATION DETAILED: PERIUMBILICAL SKIN
LOCATION DETAILED: RIGHT PROXIMAL DORSAL FOREARM
LOCATION DETAILED: RIGHT UPPER CUTANEOUS LIP
LOCATION DETAILED: LEFT ELBOW
LOCATION DETAILED: LEFT MEDIAL INFERIOR EYELID
LOCATION DETAILED: RIGHT RADIAL DORSAL HAND
LOCATION DETAILED: LEFT PROXIMAL PRETIBIAL REGION

## 2019-07-03 ASSESSMENT — LOCATION ZONE DERM
LOCATION ZONE: FACE
LOCATION ZONE: LIP
LOCATION ZONE: TRUNK
LOCATION ZONE: LEG
LOCATION ZONE: ARM
LOCATION ZONE: EYELID
LOCATION ZONE: HAND

## 2019-07-29 RX ORDER — EZETIMIBE 10 MG/1
TABLET ORAL
Qty: 90 TAB | Refills: 3 | Status: SHIPPED | OUTPATIENT
Start: 2019-07-29 | End: 2020-09-15

## 2019-11-19 ENCOUNTER — HOSPITAL ENCOUNTER (OUTPATIENT)
Dept: LAB | Facility: MEDICAL CENTER | Age: 69
End: 2019-11-19
Attending: FAMILY MEDICINE
Payer: MEDICARE

## 2019-11-19 DIAGNOSIS — E78.5 DYSLIPIDEMIA: ICD-10-CM

## 2019-11-19 LAB
25(OH)D3 SERPL-MCNC: 34 NG/ML (ref 30–100)
ALBUMIN SERPL BCP-MCNC: 4 G/DL (ref 3.2–4.9)
ALBUMIN/GLOB SERPL: 1.7 G/DL
ALP SERPL-CCNC: 74 U/L (ref 30–99)
ALT SERPL-CCNC: 16 U/L (ref 2–50)
ANION GAP SERPL CALC-SCNC: 9 MMOL/L (ref 0–11.9)
AST SERPL-CCNC: 16 U/L (ref 12–45)
BASOPHILS # BLD AUTO: 1.1 % (ref 0–1.8)
BASOPHILS # BLD: 0.06 K/UL (ref 0–0.12)
BILIRUB SERPL-MCNC: 0.7 MG/DL (ref 0.1–1.5)
BUN SERPL-MCNC: 15 MG/DL (ref 8–22)
CALCIUM SERPL-MCNC: 9.2 MG/DL (ref 8.5–10.5)
CHLORIDE SERPL-SCNC: 107 MMOL/L (ref 96–112)
CHOLEST SERPL-MCNC: 158 MG/DL (ref 100–199)
CO2 SERPL-SCNC: 24 MMOL/L (ref 20–33)
CREAT SERPL-MCNC: 0.76 MG/DL (ref 0.5–1.4)
EOSINOPHIL # BLD AUTO: 0.11 K/UL (ref 0–0.51)
EOSINOPHIL NFR BLD: 2 % (ref 0–6.9)
ERYTHROCYTE [DISTWIDTH] IN BLOOD BY AUTOMATED COUNT: 45.5 FL (ref 35.9–50)
FASTING STATUS PATIENT QL REPORTED: NORMAL
GLOBULIN SER CALC-MCNC: 2.3 G/DL (ref 1.9–3.5)
GLUCOSE SERPL-MCNC: 81 MG/DL (ref 65–99)
HCT VFR BLD AUTO: 42.5 % (ref 37–47)
HDLC SERPL-MCNC: 52 MG/DL
HGB BLD-MCNC: 14.4 G/DL (ref 12–16)
IMM GRANULOCYTES # BLD AUTO: 0.01 K/UL (ref 0–0.11)
IMM GRANULOCYTES NFR BLD AUTO: 0.2 % (ref 0–0.9)
LDLC SERPL CALC-MCNC: 83 MG/DL
LYMPHOCYTES # BLD AUTO: 1.76 K/UL (ref 1–4.8)
LYMPHOCYTES NFR BLD: 31.5 % (ref 22–41)
MCH RBC QN AUTO: 32 PG (ref 27–33)
MCHC RBC AUTO-ENTMCNC: 33.9 G/DL (ref 33.6–35)
MCV RBC AUTO: 94.4 FL (ref 81.4–97.8)
MONOCYTES # BLD AUTO: 0.44 K/UL (ref 0–0.85)
MONOCYTES NFR BLD AUTO: 7.9 % (ref 0–13.4)
NEUTROPHILS # BLD AUTO: 3.21 K/UL (ref 2–7.15)
NEUTROPHILS NFR BLD: 57.3 % (ref 44–72)
NRBC # BLD AUTO: 0 K/UL
NRBC BLD-RTO: 0 /100 WBC
PLATELET # BLD AUTO: 264 K/UL (ref 164–446)
PMV BLD AUTO: 10.7 FL (ref 9–12.9)
POTASSIUM SERPL-SCNC: 3.8 MMOL/L (ref 3.6–5.5)
PROT SERPL-MCNC: 6.3 G/DL (ref 6–8.2)
RBC # BLD AUTO: 4.5 M/UL (ref 4.2–5.4)
SODIUM SERPL-SCNC: 140 MMOL/L (ref 135–145)
TRIGL SERPL-MCNC: 116 MG/DL (ref 0–149)
TSH SERPL DL<=0.005 MIU/L-ACNC: 2.47 UIU/ML (ref 0.38–5.33)
WBC # BLD AUTO: 5.6 K/UL (ref 4.8–10.8)

## 2019-11-19 PROCEDURE — 82306 VITAMIN D 25 HYDROXY: CPT

## 2019-11-19 PROCEDURE — 36415 COLL VENOUS BLD VENIPUNCTURE: CPT

## 2019-11-19 PROCEDURE — 80053 COMPREHEN METABOLIC PANEL: CPT

## 2019-11-19 PROCEDURE — 80061 LIPID PANEL: CPT

## 2019-11-19 PROCEDURE — 85025 COMPLETE CBC W/AUTO DIFF WBC: CPT

## 2019-11-19 PROCEDURE — 84443 ASSAY THYROID STIM HORMONE: CPT

## 2019-11-22 ENCOUNTER — HOSPITAL ENCOUNTER (OUTPATIENT)
Dept: RADIOLOGY | Facility: MEDICAL CENTER | Age: 69
End: 2019-11-22
Attending: FAMILY MEDICINE
Payer: MEDICARE

## 2019-11-22 DIAGNOSIS — N95.8 OTHER SPECIFIED MENOPAUSAL AND PERIMENOPAUSAL DISORDERS: ICD-10-CM

## 2019-11-22 PROCEDURE — 72110 X-RAY EXAM L-2 SPINE 4/>VWS: CPT

## 2020-01-14 ENCOUNTER — APPOINTMENT (RX ONLY)
Dept: URBAN - METROPOLITAN AREA CLINIC 35 | Facility: CLINIC | Age: 70
Setting detail: DERMATOLOGY
End: 2020-01-14

## 2020-01-14 DIAGNOSIS — L72.8 OTHER FOLLICULAR CYSTS OF THE SKIN AND SUBCUTANEOUS TISSUE: ICD-10-CM

## 2020-01-14 DIAGNOSIS — L82.1 OTHER SEBORRHEIC KERATOSIS: ICD-10-CM

## 2020-01-14 DIAGNOSIS — Z85.828 PERSONAL HISTORY OF OTHER MALIGNANT NEOPLASM OF SKIN: ICD-10-CM

## 2020-01-14 DIAGNOSIS — D18.0 HEMANGIOMA: ICD-10-CM

## 2020-01-14 DIAGNOSIS — Z87.2 PERSONAL HISTORY OF DISEASES OF THE SKIN AND SUBCUTANEOUS TISSUE: ICD-10-CM

## 2020-01-14 DIAGNOSIS — L82.0 INFLAMED SEBORRHEIC KERATOSIS: ICD-10-CM

## 2020-01-14 DIAGNOSIS — D17 BENIGN LIPOMATOUS NEOPLASM: ICD-10-CM

## 2020-01-14 DIAGNOSIS — L91.8 OTHER HYPERTROPHIC DISORDERS OF THE SKIN: ICD-10-CM

## 2020-01-14 DIAGNOSIS — Z71.89 OTHER SPECIFIED COUNSELING: ICD-10-CM

## 2020-01-14 DIAGNOSIS — L81.4 OTHER MELANIN HYPERPIGMENTATION: ICD-10-CM

## 2020-01-14 PROBLEM — D17.21 BENIGN LIPOMATOUS NEOPLASM OF SKIN AND SUBCUTANEOUS TISSUE OF RIGHT ARM: Status: ACTIVE | Noted: 2020-01-14

## 2020-01-14 PROBLEM — D17.23 BENIGN LIPOMATOUS NEOPLASM OF SKIN AND SUBCUTANEOUS TISSUE OF RIGHT LEG: Status: ACTIVE | Noted: 2020-01-14

## 2020-01-14 PROBLEM — D17.24 BENIGN LIPOMATOUS NEOPLASM OF SKIN AND SUBCUTANEOUS TISSUE OF LEFT LEG: Status: ACTIVE | Noted: 2020-01-14

## 2020-01-14 PROBLEM — D17.22 BENIGN LIPOMATOUS NEOPLASM OF SKIN AND SUBCUTANEOUS TISSUE OF LEFT ARM: Status: ACTIVE | Noted: 2020-01-14

## 2020-01-14 PROBLEM — D18.01 HEMANGIOMA OF SKIN AND SUBCUTANEOUS TISSUE: Status: ACTIVE | Noted: 2020-01-14

## 2020-01-14 PROBLEM — D23.5 OTHER BENIGN NEOPLASM OF SKIN OF TRUNK: Status: ACTIVE | Noted: 2020-01-14

## 2020-01-14 PROCEDURE — 99214 OFFICE O/P EST MOD 30 MIN: CPT | Mod: 25

## 2020-01-14 PROCEDURE — ? COUNSELING

## 2020-01-14 PROCEDURE — ? LIQUID NITROGEN

## 2020-01-14 PROCEDURE — 17110 DESTRUCTION B9 LES UP TO 14: CPT

## 2020-01-14 ASSESSMENT — LOCATION DETAILED DESCRIPTION DERM
LOCATION DETAILED: LEFT POSTERIOR SHOULDER
LOCATION DETAILED: RIGHT MEDIAL DISTAL PRETIBIAL REGION
LOCATION DETAILED: LEFT PROXIMAL PRETIBIAL REGION
LOCATION DETAILED: LEFT INFERIOR LATERAL MIDBACK
LOCATION DETAILED: LEFT ANTERIOR PROXIMAL THIGH
LOCATION DETAILED: RIGHT MEDIAL SUPERIOR CHEST
LOCATION DETAILED: PERIUMBILICAL SKIN
LOCATION DETAILED: RIGHT SUPRAPUBIC SKIN
LOCATION DETAILED: LEFT MEDIAL BREAST 9-10:00 REGION
LOCATION DETAILED: LEFT MEDIAL CANTHUS
LOCATION DETAILED: LEFT PROXIMAL DORSAL FOREARM
LOCATION DETAILED: RIGHT ANTERIOR DISTAL THIGH
LOCATION DETAILED: LEFT MID-UPPER BACK
LOCATION DETAILED: RIGHT MEDIAL INFERIOR EYELID
LOCATION DETAILED: RIGHT PROXIMAL DORSAL FOREARM
LOCATION DETAILED: LEFT INFERIOR UPPER BACK
LOCATION DETAILED: LEFT MEDIAL INFERIOR EYELID

## 2020-01-14 ASSESSMENT — LOCATION SIMPLE DESCRIPTION DERM
LOCATION SIMPLE: LEFT BREAST
LOCATION SIMPLE: LEFT UPPER BACK
LOCATION SIMPLE: RIGHT INFERIOR EYELID
LOCATION SIMPLE: GROIN
LOCATION SIMPLE: RIGHT FOREARM
LOCATION SIMPLE: LEFT EYELID
LOCATION SIMPLE: LEFT LOWER BACK
LOCATION SIMPLE: RIGHT THIGH
LOCATION SIMPLE: LEFT INFERIOR EYELID
LOCATION SIMPLE: ABDOMEN
LOCATION SIMPLE: RIGHT PRETIBIAL REGION
LOCATION SIMPLE: CHEST
LOCATION SIMPLE: LEFT FOREARM
LOCATION SIMPLE: LEFT SHOULDER
LOCATION SIMPLE: LEFT PRETIBIAL REGION
LOCATION SIMPLE: LEFT THIGH

## 2020-01-14 ASSESSMENT — LOCATION ZONE DERM
LOCATION ZONE: EYELID
LOCATION ZONE: ARM
LOCATION ZONE: LEG
LOCATION ZONE: TRUNK

## 2020-01-14 NOTE — PROCEDURE: LIQUID NITROGEN
Detail Level: Detailed
Include Z78.9 (Other Specified Conditions Influencing Health Status) As An Associated Diagnosis?: Yes
Add 52 Modifier (Optional): no
Number Of Freeze-Thaw Cycles: 2 freeze-thaw cycles
Medical Necessity Clause: This procedure was medically necessary because the lesions that were treated were:
Duration Of Freeze Thaw-Cycle (Seconds): 2
Post-Care Instructions: I reviewed with the patient in detail post-care instructions. Patient is to wear sunprotection, and avoid picking at any of the treated lesions. Pt may apply Vaseline to crusted or scabbing areas.
Medical Necessity Information: It is in your best interest to select a reason for this procedure from the list below. All of these items fulfill various CMS LCD requirements except the new and changing color options.
Consent: The patient's consent was obtained including but not limited to risks of crusting, scabbing, blistering, scarring, darker or lighter pigmentary change, recurrence, incomplete removal and infection.

## 2020-01-28 ENCOUNTER — APPOINTMENT (RX ONLY)
Dept: URBAN - METROPOLITAN AREA CLINIC 35 | Facility: CLINIC | Age: 70
Setting detail: DERMATOLOGY
End: 2020-01-28

## 2020-01-28 DIAGNOSIS — L82.1 OTHER SEBORRHEIC KERATOSIS: ICD-10-CM

## 2020-01-28 PROCEDURE — ? BENIGN DESTRUCTION COSMETIC

## 2020-01-28 ASSESSMENT — LOCATION DETAILED DESCRIPTION DERM
LOCATION DETAILED: LEFT INFERIOR UPPER BACK
LOCATION DETAILED: LEFT RIB CAGE
LOCATION DETAILED: RIGHT INFERIOR MEDIAL MIDBACK
LOCATION DETAILED: LEFT LATERAL UPPER BACK
LOCATION DETAILED: RIGHT SUPERIOR UPPER BACK
LOCATION DETAILED: LEFT SUPERIOR LATERAL LOWER BACK
LOCATION DETAILED: EPIGASTRIC SKIN
LOCATION DETAILED: LEFT MEDIAL UPPER BACK
LOCATION DETAILED: LEFT LATERAL SUPERIOR CHEST
LOCATION DETAILED: LEFT INFERIOR MEDIAL LOWER BACK
LOCATION DETAILED: LEFT ANTERIOR SHOULDER
LOCATION DETAILED: LEFT POSTERIOR SHOULDER
LOCATION DETAILED: RIGHT MEDIAL BREAST 2-3:00 REGION
LOCATION DETAILED: LEFT MEDIAL SUPERIOR CHEST
LOCATION DETAILED: SUPERIOR LUMBAR SPINE
LOCATION DETAILED: RIGHT MEDIAL BREAST 1-2:00 REGION
LOCATION DETAILED: RIGHT MID-UPPER BACK
LOCATION DETAILED: RIGHT SUPERIOR LATERAL LOWER BACK
LOCATION DETAILED: LEFT SUPERIOR LATERAL MIDBACK
LOCATION DETAILED: RIGHT MEDIAL TRAPEZIAL NECK
LOCATION DETAILED: RIGHT MEDIAL SUPERIOR CHEST
LOCATION DETAILED: LEFT SUPERIOR UPPER BACK
LOCATION DETAILED: LEFT MID-UPPER BACK
LOCATION DETAILED: RIGHT ANTERIOR SHOULDER
LOCATION DETAILED: RIGHT LATERAL ABDOMEN
LOCATION DETAILED: RIGHT INFERIOR LATERAL MIDBACK
LOCATION DETAILED: RIGHT RIB CAGE
LOCATION DETAILED: PERIUMBILICAL SKIN
LOCATION DETAILED: RIGHT LATERAL SUPERIOR CHEST
LOCATION DETAILED: RIGHT INFERIOR UPPER BACK
LOCATION DETAILED: LEFT LATERAL ABDOMEN

## 2020-01-28 ASSESSMENT — LOCATION SIMPLE DESCRIPTION DERM
LOCATION SIMPLE: RIGHT LOWER BACK
LOCATION SIMPLE: RIGHT UPPER BACK
LOCATION SIMPLE: CHEST
LOCATION SIMPLE: RIGHT SHOULDER
LOCATION SIMPLE: LEFT UPPER BACK
LOCATION SIMPLE: RIGHT BREAST
LOCATION SIMPLE: POSTERIOR NECK
LOCATION SIMPLE: ABDOMEN
LOCATION SIMPLE: LOWER BACK
LOCATION SIMPLE: LEFT SHOULDER
LOCATION SIMPLE: LEFT LOWER BACK

## 2020-01-28 ASSESSMENT — LOCATION ZONE DERM
LOCATION ZONE: NECK
LOCATION ZONE: TRUNK
LOCATION ZONE: ARM

## 2020-01-28 NOTE — PROCEDURE: BENIGN DESTRUCTION COSMETIC
Consent: The patient's consent was obtained including but not limited to risks of crusting, scabbing, blistering, scarring, darker or lighter pigmentary change, recurrence, incomplete removal and infection.
Price (Use Numbers Only, No Special Characters Or $): 200
Detail Level: Detailed
Post-Care Instructions: I reviewed with the patient in detail post-care instructions. Patient is to wear sunprotection, and avoid picking at any of the treated lesions. Pt may apply Vaseline to crusted or scabbing areas.

## 2020-02-26 ENCOUNTER — TELEPHONE (OUTPATIENT)
Dept: VASCULAR LAB | Facility: MEDICAL CENTER | Age: 70
End: 2020-02-26

## 2020-02-26 DIAGNOSIS — I65.23 CAROTID ATHEROSCLEROSIS, BILATERAL: ICD-10-CM

## 2020-02-26 NOTE — TELEPHONE ENCOUNTER
Spoke with the pt regarding the Carotid duplex that Dr. Carrero wanted me to order for the pt. Informed the pt that medicare will not pay for the test but we can do a vascular screening that will  the carotids and it is a cash pay test. She is willing to do the test. I ordered it today she can call tomorrow to get it scheduled. NOAH Earl.

## 2020-03-05 ENCOUNTER — TELEPHONE (OUTPATIENT)
Dept: VASCULAR LAB | Facility: MEDICAL CENTER | Age: 70
End: 2020-03-05

## 2020-03-06 NOTE — TELEPHONE ENCOUNTER
Called pt to remind that the  Total vascular screening   is due for surveillance. Scheduling office and our office phone numbers provided. NOAH Earl.

## 2020-04-07 ENCOUNTER — APPOINTMENT (OUTPATIENT)
Dept: RADIOLOGY | Facility: MEDICAL CENTER | Age: 70
End: 2020-04-07
Attending: NURSE PRACTITIONER
Payer: MEDICARE

## 2020-05-22 ENCOUNTER — HOSPITAL ENCOUNTER (OUTPATIENT)
Dept: RADIOLOGY | Facility: MEDICAL CENTER | Age: 70
End: 2020-05-22
Attending: NURSE PRACTITIONER | Admitting: NURSE PRACTITIONER
Payer: MEDICARE

## 2020-05-22 DIAGNOSIS — I65.23 CAROTID ATHEROSCLEROSIS, BILATERAL: ICD-10-CM

## 2020-05-22 PROCEDURE — 306723 US-TOTAL VASCULAR SCREENING (S/P)

## 2020-05-26 ASSESSMENT — ENCOUNTER SYMPTOMS
BRUISES/BLEEDS EASILY: 0
MYALGIAS: 0
PALPITATIONS: 0
WHEEZING: 0
TREMORS: 0
SEIZURES: 0
COUGH: 0
CHILLS: 0
SHORTNESS OF BREATH: 0
WEAKNESS: 0
HEADACHES: 0
ABDOMINAL PAIN: 0
HEMOPTYSIS: 0
FEVER: 0
DIZZINESS: 0
DIARRHEA: 0
FOCAL WEAKNESS: 0
NAUSEA: 0
VOMITING: 0

## 2020-05-27 ENCOUNTER — OFFICE VISIT (OUTPATIENT)
Dept: VASCULAR LAB | Facility: MEDICAL CENTER | Age: 70
End: 2020-05-27
Attending: FAMILY MEDICINE
Payer: MEDICARE

## 2020-05-27 VITALS
DIASTOLIC BLOOD PRESSURE: 65 MMHG | BODY MASS INDEX: 23.11 KG/M2 | WEIGHT: 143.8 LBS | HEART RATE: 75 BPM | HEIGHT: 66 IN | SYSTOLIC BLOOD PRESSURE: 124 MMHG

## 2020-05-27 DIAGNOSIS — Z79.02 LONG TERM (CURRENT) USE OF ANTITHROMBOTICS/ANTIPLATELETS: ICD-10-CM

## 2020-05-27 DIAGNOSIS — I72.8 ANEURYSM OF SPLENIC ARTERY (HCC): ICD-10-CM

## 2020-05-27 DIAGNOSIS — I65.23 CAROTID ATHEROSCLEROSIS, BILATERAL: ICD-10-CM

## 2020-05-27 DIAGNOSIS — E78.5 DYSLIPIDEMIA: ICD-10-CM

## 2020-05-27 PROCEDURE — 99212 OFFICE O/P EST SF 10 MIN: CPT | Performed by: NURSE PRACTITIONER

## 2020-05-27 PROCEDURE — 99214 OFFICE O/P EST MOD 30 MIN: CPT | Performed by: NURSE PRACTITIONER

## 2020-05-27 ASSESSMENT — FIBROSIS 4 INDEX: FIB4 SCORE: 1.08

## 2020-05-27 ASSESSMENT — ENCOUNTER SYMPTOMS: BLOOD IN STOOL: 0

## 2020-05-27 NOTE — PROGRESS NOTES
FOLLOW-UP VASCULAR VISIT  Subjective:   Isabel Peralta is a 69 y.o. female who presents today 20 for   No chief complaint on file.      HPI:    Hyperlipidemia:    Stable, no current concerns  Current treatment: rosuva 5mg daily, zetia   Myalgias? No  Other adverse drug reactions? No  Lipid profile:     Lab Results  Component Value Date/Time   CHOLSTRLTOT 143 2018 0949   TRIGLYCERIDE 113 2018 0949   HDL 51 2018 0949   LDL 69 2018 0949     LDL (mg/dL)   Date Value   2018 69   05/10/2018 71     HDL (mg/dL)   Date Value   2018 51   05/10/2018 51     Carotid artery disease:   No h/o tia or cva symptoms including presyncope, syncope or HA.    Splenic a. Aneurysm - denies LUQ pain or dullness, stable on prior serial exams.    Antiplat: Taking ASA w/o bleeding issues.     Social History     Tobacco Use   Smoking Status Former Smoker   • Packs/day: 0.50   • Years: 4.00   • Pack years: 2.00   • Last attempt to quit: 1971   • Years since quittin.4   Smokeless Tobacco Never Used     Social History     Tobacco Use   • Smoking status: Former Smoker     Packs/day: 0.50     Years: 4.00     Pack years: 2.00     Last attempt to quit: 1971     Years since quittin.4   • Smokeless tobacco: Never Used   Substance Use Topics   • Alcohol use: No   • Drug use: No     Outpatient Encounter Medications as of 2020   Medication Sig Dispense Refill   • Calcium Carbonate-Vitamin D (CALCIUM-VITAMIN D3 PO) Take  by mouth.     • FIBER ADULT GUMMIES PO Take 5 g by mouth.     • rosuvastatin (CRESTOR) 5 MG Tab TAKE 1 TABLET BY MOUTH ONCE DAILY IN THE EVENING (Patient taking differently: Take 5 mg by mouth 3 times a week. Taking ) 90 Tab 3   • ezetimibe (ZETIA) 10 MG Tab TAKE 1 TABLET BY MOUTH ONCE DAILY 90 Tab 3   • ibuprofen (MOTRIN) 200 MG TABS Take 200 mg by mouth every 6 hours as needed.     • pantoprazole (PROTONIX) 40 MG TBEC Take 40 mg by mouth every day.     •  "estradiol (VAGIFEM) 10 MCG TABS Insert 10 mcg in vagina. Twice a week     • aspirin 81 MG tablet Take 81 mg by mouth every day.     • triamcinolone (NASACORT) 55 MCG/ACT nasal inhaler Spray 1 Spray in nose every day.     • Probiotic Product (PROBIOTIC DAILY PO) Take  by mouth.     • [DISCONTINUED] Cholecalciferol (VITAMIN D) 2000 UNITS CAPS Take  by mouth every day.     • [DISCONTINUED] Calcium-Magnesium-Vitamin D (CALCIUM 500 PO) Take 1,000 mg by mouth 2 Times a Day.       No facility-administered encounter medications on file as of 5/27/2020.      Allergies   Allergen Reactions   • Gluten Meal      Belching, burping, bloating, \"stomach churning\"     DIET AND EXERCISE:  Weight Change: continued decrease  BMI Readings from Last 4 Encounters:   05/27/20 23.21 kg/m²   05/23/19 22.51 kg/m²   05/18/18 23.78 kg/m²   03/21/18 24.87 kg/m²      Diet: gluten-free with high volume veg/fruits due to celiac dz   Exercise: moderate regular exercise program     Review of Systems   Constitutional: Negative for chills, fever and malaise/fatigue.   HENT: Negative for nosebleeds.    Respiratory: Negative for cough, hemoptysis, shortness of breath and wheezing.    Cardiovascular: Negative for chest pain, palpitations and leg swelling.   Gastrointestinal: Negative for abdominal pain, blood in stool, diarrhea, nausea and vomiting.   Genitourinary: Negative for hematuria.   Musculoskeletal: Negative for joint pain and myalgias.   Skin: Negative for itching and rash.   Neurological: Negative for dizziness, tremors, focal weakness, seizures, weakness and headaches.   Endo/Heme/Allergies: Does not bruise/bleed easily.        Objective:     Vitals:    05/27/20 1028 05/27/20 1034   BP: 133/72 124/65   BP Location: Left arm Left arm   Patient Position: Sitting Sitting   BP Cuff Size: Adult Adult   Pulse: 76 75   Weight: 65.2 kg (143 lb 12.8 oz)    Height: 1.676 m (5' 6\")       BP Readings from Last 4 Encounters:   05/27/20 124/65   05/23/19 " 125/70   05/18/18 117/66   03/21/18 144/71     Body mass index is 23.21 kg/m².  Physical Exam   Constitutional: She is oriented to person, place, and time. She appears well-developed and well-nourished. No distress.   HENT:   Head: Normocephalic and atraumatic.   Eyes: Pupils are equal, round, and reactive to light. Conjunctivae and EOM are normal. No scleral icterus.   Neck: Normal range of motion. Neck supple. No JVD present. No thyromegaly present.   Cardiovascular: Normal rate, regular rhythm, normal heart sounds and intact distal pulses. Exam reveals no gallop and no friction rub.   No murmur heard.  Pulses:       Carotid pulses are 2+ on the right side and 2+ on the left side.       Dorsalis pedis pulses are 2+ on the right side and 2+ on the left side.        Posterior tibial pulses are 2+ on the right side and 2+ on the left side.   No bruits appreciated in abdomen    Pulmonary/Chest: Effort normal and breath sounds normal. No respiratory distress. She has no wheezes. She has no rales. She exhibits no tenderness.   Musculoskeletal: Normal range of motion.         General: No tenderness or edema.   Neurological: She is alert and oriented to person, place, and time. She has normal reflexes. No cranial nerve deficit. Coordination normal.   Skin: Skin is warm and dry. She is not diaphoretic. No pallor.   Psychiatric: She has a normal mood and affect.   Vitals reviewed.    Lab Results   Component Value Date    CHOLSTRLTOT 158 11/19/2019    LDL 83 11/19/2019    HDL 52 11/19/2019    TRIGLYCERIDE 116 11/19/2019           Lab Results   Component Value Date    SODIUM 140 11/19/2019    POTASSIUM 3.8 11/19/2019    CHLORIDE 107 11/19/2019    CO2 24 11/19/2019    GLUCOSE 81 11/19/2019    BUN 15 11/19/2019    CREATININE 0.76 11/19/2019    IF[place] >60 11/19/2019    IFNOTAFR >60 11/19/2019        Lab Results   Component Value Date    WBC 5.6 11/19/2019    RBC 4.50 11/19/2019    HEMOGLOBIN 14.4 11/19/2019    HEMATOCRIT 42.5  11/19/2019    MCV 94.4 11/19/2019    MCH 32.0 11/19/2019    MCHC 33.9 11/19/2019    MPV 10.7 11/19/2019      CTA abdomen aug 2016:  1.  There is no change in 2 small splenic artery aneurysms measuring 10-11 mm in diameter with minimal peripheral thrombus and some peripheral calcification.  2.  There is mild atherosclerosis of the abdominal aorta.  3.  There is normal variant branching at the celiac axis with the common hepatic artery originating directly from the abdominal aorta at the level of the celiac axis.  4.  There may be a small hiatal hernia again seen    Stress MPI Jan 2017:  NUCLEAR IMAGING INTERPRETATION   No evidence of significant jeopardized viable myocardium or prior myocardial    infarction.   Normal left ventricular wall motion.  LV ejection fraction = 77%.   ECG INTERPRETATION   Negative stress ECG for ischemia.    Carotid duplex april 2017:  1.  There is a mild amount of atherosclerotic plaque.  Plaque is located in carotid bulbs and proximal internal and external carotid arteries.  Plaque characterization:  Calcific and irregular  2.  There is no evidence of carotid occlusion.  3. Vertebral arteries demonstrate antegrade flow.  4. Diameter reduction in the internal carotid arteries: less than 50%. There is no hemodynamically significant stenosis.    MRI head april 2017:  MRI of the brain without and with contrast within normal limits.    CTA abd/pelvis 4/8/19  1.  There is no interval change in 2 small splenic artery aneurysms each measuring 10-11 mm in diameter with minimal peripheral thrombus and calcification.  2.  There is mild atherosclerosis of the abdominal aorta.  3.  There are no suspicious parenchymal organ findings.  Variant anatomy with the common hepatic artery originating directly from the aorta is again noted. Celiac axis and SMA are patent. CYNTHIA is patent. Both renal arteries are patent.  Total Vascular screening 5/22/20    1. Mild atherosclerosis as detailed above.   2. No  significant stenosis seen.    Medical Decision Making:  Today's Assessment / Status / Plan:     1. Splenic artery aneurysm (HCC)  Comp Metabolic Panel   2. Long term (current) use of antithrombotics/antiplatelets  Comp Metabolic Panel   3. Dyslipidemia  Lipid Profile   4. Carotid atherosclerosis, bilateral  Comp Metabolic Panel     Patient Type: Primary Prevention.    Etiology of Established CVD if Present:   1) Mild carotid atherosclerosis, asymptomatic, no previous intervention  2) Splenic artery aneurysm - stable over time    Lipid Management: Qualifies for Statin Therapy Based on 2013 ACC/AHA Guidelines: yes  Calculated 10-Year Risk of ASCVD: 10.4%  Currently on Statin:  Crestor 5mg 3x/week  Given subclinical atherosclerosis and FH would like to treat as high risk with goal LDL-C <70 and nonHDL <100.    Lp(a) normal.   - currently at goal:  Yes   Plan:  - continue zetia 10mg daily   - Increase crestor 5 mg to 4x per week as she was not at goal   - stop and call if any myalgias or abdominal symptoms  - continue gluten-free diet    Blood Pressure Management: ACC/AHA (2017) goal <130/80  Home BP at goal: yes  Office BP at goal:  yes  Echo: not done   ACR: note done   Plan:   Monitoring:   - continue home BP monitoring, reviewed correct technique:  Yes   - order 24h ABPM:  NO  Medications: no meds indicated at this time     Glycemic Status: Normal    Anti-Platelet/Anti-Coagulant Tx: yes  - continue low dose asa    Smoking: continued complete avoidance of all tobacco products     Physical Activity: continue healthy activity to improve CV fitness, see care instructions for additional details     Weight Management and Nutrition: Dietary plan was discussed with patient at this visit including DASH, low sodium and/or as outlined in care instructions       Other:     1) Mild carotid atherosclerosis, asymptomatic, no previous intervention.  - Continue medical management.    - Repeat duplex every 3 years or so (next in  2020) or with symptoms     2) Splenic artery aneurysm - stable over time.    - Repeat CTA every 3 yrs (2022)    Instructed to follow-up with PCP for remainder of adult medical needs: yes  We will partner with other providers in the management of established vascular disease and cardiometabolic risk factors.    Studies to Be Obtained: 1) CTA abdomen (4/2022), 2) carotid duplex (4/2023) vascular scan   Labs to Be Obtained: cmp, lipid prior to next visit     Follow up in: 3 months  NOAH Earl.      Cc:  ANJEL Bean MD

## 2020-06-09 ENCOUNTER — HOSPITAL ENCOUNTER (OUTPATIENT)
Dept: RADIOLOGY | Facility: MEDICAL CENTER | Age: 70
End: 2020-06-09
Attending: FAMILY MEDICINE
Payer: MEDICARE

## 2020-06-09 DIAGNOSIS — N64.4 MASTODYNIA: ICD-10-CM

## 2020-06-09 DIAGNOSIS — N95.8 OTHER SPECIFIED MENOPAUSAL AND PERIMENOPAUSAL DISORDERS: ICD-10-CM

## 2020-06-09 PROCEDURE — 77080 DXA BONE DENSITY AXIAL: CPT

## 2020-06-09 PROCEDURE — G0279 TOMOSYNTHESIS, MAMMO: HCPCS

## 2020-07-14 ENCOUNTER — APPOINTMENT (RX ONLY)
Dept: URBAN - METROPOLITAN AREA CLINIC 35 | Facility: CLINIC | Age: 70
Setting detail: DERMATOLOGY
End: 2020-07-14

## 2020-07-14 DIAGNOSIS — Z87.2 PERSONAL HISTORY OF DISEASES OF THE SKIN AND SUBCUTANEOUS TISSUE: ICD-10-CM

## 2020-07-14 DIAGNOSIS — D17 BENIGN LIPOMATOUS NEOPLASM: ICD-10-CM

## 2020-07-14 DIAGNOSIS — Z85.828 PERSONAL HISTORY OF OTHER MALIGNANT NEOPLASM OF SKIN: ICD-10-CM

## 2020-07-14 DIAGNOSIS — D18.0 HEMANGIOMA: ICD-10-CM

## 2020-07-14 DIAGNOSIS — Z71.89 OTHER SPECIFIED COUNSELING: ICD-10-CM

## 2020-07-14 DIAGNOSIS — L81.4 OTHER MELANIN HYPERPIGMENTATION: ICD-10-CM

## 2020-07-14 DIAGNOSIS — I78.8 OTHER DISEASES OF CAPILLARIES: ICD-10-CM

## 2020-07-14 DIAGNOSIS — L82.1 OTHER SEBORRHEIC KERATOSIS: ICD-10-CM

## 2020-07-14 DIAGNOSIS — L72.8 OTHER FOLLICULAR CYSTS OF THE SKIN AND SUBCUTANEOUS TISSUE: ICD-10-CM

## 2020-07-14 PROBLEM — D17.24 BENIGN LIPOMATOUS NEOPLASM OF SKIN AND SUBCUTANEOUS TISSUE OF LEFT LEG: Status: ACTIVE | Noted: 2020-07-14

## 2020-07-14 PROBLEM — D17.22 BENIGN LIPOMATOUS NEOPLASM OF SKIN AND SUBCUTANEOUS TISSUE OF LEFT ARM: Status: ACTIVE | Noted: 2020-07-14

## 2020-07-14 PROBLEM — D18.01 HEMANGIOMA OF SKIN AND SUBCUTANEOUS TISSUE: Status: ACTIVE | Noted: 2020-07-14

## 2020-07-14 PROBLEM — D17.21 BENIGN LIPOMATOUS NEOPLASM OF SKIN AND SUBCUTANEOUS TISSUE OF RIGHT ARM: Status: ACTIVE | Noted: 2020-07-14

## 2020-07-14 PROBLEM — D48.5 NEOPLASM OF UNCERTAIN BEHAVIOR OF SKIN: Status: ACTIVE | Noted: 2020-07-14

## 2020-07-14 PROBLEM — D17.23 BENIGN LIPOMATOUS NEOPLASM OF SKIN AND SUBCUTANEOUS TISSUE OF RIGHT LEG: Status: ACTIVE | Noted: 2020-07-14

## 2020-07-14 PROBLEM — D23.5 OTHER BENIGN NEOPLASM OF SKIN OF TRUNK: Status: ACTIVE | Noted: 2020-07-14

## 2020-07-14 PROCEDURE — 11102 TANGNTL BX SKIN SINGLE LES: CPT

## 2020-07-14 PROCEDURE — ? BIOPSY BY SHAVE METHOD

## 2020-07-14 PROCEDURE — 99214 OFFICE O/P EST MOD 30 MIN: CPT | Mod: 25

## 2020-07-14 PROCEDURE — ? COUNSELING

## 2020-07-14 PROCEDURE — ? ADDITIONAL NOTES

## 2020-07-14 ASSESSMENT — LOCATION SIMPLE DESCRIPTION DERM
LOCATION SIMPLE: LEFT THIGH
LOCATION SIMPLE: LEFT FOREARM
LOCATION SIMPLE: RIGHT THIGH
LOCATION SIMPLE: RIGHT FOREARM
LOCATION SIMPLE: LEFT CHEEK
LOCATION SIMPLE: LEFT UPPER BACK
LOCATION SIMPLE: RIGHT PRETIBIAL REGION
LOCATION SIMPLE: LEFT SHOULDER
LOCATION SIMPLE: LEFT PRETIBIAL REGION
LOCATION SIMPLE: CHEST
LOCATION SIMPLE: ABDOMEN

## 2020-07-14 ASSESSMENT — LOCATION DETAILED DESCRIPTION DERM
LOCATION DETAILED: RIGHT ANTERIOR DISTAL THIGH
LOCATION DETAILED: LEFT PROXIMAL DORSAL FOREARM
LOCATION DETAILED: PERIUMBILICAL SKIN
LOCATION DETAILED: LEFT PROXIMAL PRETIBIAL REGION
LOCATION DETAILED: LEFT MID-UPPER BACK
LOCATION DETAILED: LEFT INFERIOR UPPER BACK
LOCATION DETAILED: RIGHT PROXIMAL DORSAL FOREARM
LOCATION DETAILED: LEFT CENTRAL MALAR CHEEK
LOCATION DETAILED: RIGHT MEDIAL DISTAL PRETIBIAL REGION
LOCATION DETAILED: LEFT POSTERIOR SHOULDER
LOCATION DETAILED: RIGHT MEDIAL SUPERIOR CHEST
LOCATION DETAILED: LEFT ANTERIOR PROXIMAL THIGH

## 2020-07-14 ASSESSMENT — LOCATION ZONE DERM
LOCATION ZONE: ARM
LOCATION ZONE: LEG
LOCATION ZONE: TRUNK
LOCATION ZONE: FACE

## 2020-07-14 NOTE — PROCEDURE: ADDITIONAL NOTES
Additional Notes: Plan elective cosmetic treatment with liquid nitrogen, patient quoted $125 for up to 15 lesions
Detail Level: Zone

## 2020-07-14 NOTE — PROCEDURE: BIOPSY BY SHAVE METHOD
Detail Level: Detailed
Depth Of Biopsy: dermis
Was A Bandage Applied: Yes
Size Of Lesion In Cm: 0.5
X Size Of Lesion In Cm: 0
Biopsy Type: H and E
Biopsy Method: Dermablade
Anesthesia Type: 1% lidocaine with 1:100,000 epinephrine and a 1:12 solution of 8.4% sodium bicarbonate
Hemostasis: Aluminum Chloride
Wound Care: Petrolatum
Dressing: Band-Aid
Destruction After The Procedure: No
Type Of Destruction Used: Electrodesiccation and Curettage
Curettage Text: The wound bed was treated with curettage after the biopsy was performed.
Electrodesiccation And Curettage Text: The wound bed was treated with electrodesiccation and curettage after the biopsy was
Lab: [id number]
Lab Facility: 
Consent: Written consent was obtained and risks were reviewed including but not limited to scarring, infection, bleeding, scabbing, incomplete removal, nerve damage and allergy to anesthesia.
Post-Care Instructions: I reviewed with the patient in detail post-care instructions. Patient is to keep the biopsy site dry overnight, and then apply white petrolatum twice daily until healed. Patient may apply hydrogen peroxide soaks to remove any crusting.
Notification Instructions: Patient will be notified of biopsy results. However, patient instructed to call the office if not contacted within 2 weeks.
Billing Type: Third-Party Bill
Information: Selecting Yes will display possible errors in your note based on the variables you have selected. This validation is only offered as a suggestion for you. PLEASE NOTE THAT THE VALIDATION TEXT WILL BE REMOVED WHEN YOU FINALIZE YOUR NOTE. IF YOU WANT TO FAX A PRELIMINARY NOTE YOU WILL NEED TO TOGGLE THIS TO 'NO' IF YOU DO NOT WANT IT IN YOUR FAXED NOTE.

## 2020-08-27 ASSESSMENT — ENCOUNTER SYMPTOMS
CHILLS: 0
COUGH: 0
DIZZINESS: 0
PALPITATIONS: 0
DIARRHEA: 0
VOMITING: 0
MYALGIAS: 0
ABDOMINAL PAIN: 0
WEAKNESS: 0
BLOOD IN STOOL: 0
NAUSEA: 0
SEIZURES: 0
WHEEZING: 0
SHORTNESS OF BREATH: 0
FOCAL WEAKNESS: 0
TREMORS: 0
HEMOPTYSIS: 0
FEVER: 0
BRUISES/BLEEDS EASILY: 0
HEADACHES: 0

## 2020-08-27 NOTE — PROGRESS NOTES
FOLLOW-UP VASCULAR VISIT  Subjective:   Isabel Peralta is a 70 y.o. female who presents today 18 for   No chief complaint on file.      HPI:    Hyperlipidemia:    Stable, no current concerns  Current treatment: rosuva 5mg 4 times a week and zetia   Myalgias? No  Other adverse drug reactions? No  Lipid profile:     Lab Results  Component Value Date/Time   CHOLSTRLTOT 143 2018 0949   TRIGLYCERIDE 113 2018 0949   HDL 51 2018 0949   LDL 69 2018 0949     LDL (mg/dL)   Date Value   2018 69   05/10/2018 71     HDL (mg/dL)   Date Value   2018 51   05/10/2018 51     Carotid artery disease:   No h/o tia or cva symptoms including presyncope, syncope or HA.    Splenic a. Aneurysm - denies LUQ pain or dullness, stable on prior serial exams.    Antiplat: Taking ASA w/o bleeding issues.     Social History     Tobacco Use   Smoking Status Former Smoker   • Packs/day: 0.50   • Years: 4.00   • Pack years: 2.00   • Quit date: 1971   • Years since quittin.6   Smokeless Tobacco Never Used     Social History     Tobacco Use   • Smoking status: Former Smoker     Packs/day: 0.50     Years: 4.00     Pack years: 2.00     Quit date: 1971     Years since quittin.6   • Smokeless tobacco: Never Used   Substance Use Topics   • Alcohol use: No   • Drug use: No     Outpatient Encounter Medications as of 2020   Medication Sig Dispense Refill   • Calcium Carbonate-Vitamin D (CALCIUM-VITAMIN D3 PO) Take  by mouth.     • FIBER ADULT GUMMIES PO Take 5 g by mouth.     • rosuvastatin (CRESTOR) 5 MG Tab TAKE 1 TABLET BY MOUTH ONCE DAILY IN THE EVENING (Patient taking differently: Take 5 mg by mouth 3 times a week. Taking ) 90 Tab 3   • ezetimibe (ZETIA) 10 MG Tab TAKE 1 TABLET BY MOUTH ONCE DAILY 90 Tab 3   • ibuprofen (MOTRIN) 200 MG TABS Take 200 mg by mouth every 6 hours as needed.     • pantoprazole (PROTONIX) 40 MG TBEC Take 40 mg by mouth every day.     • estradiol  "(VAGIFEM) 10 MCG TABS Insert 10 mcg in vagina. Twice a week     • aspirin 81 MG tablet Take 81 mg by mouth every day.     • triamcinolone (NASACORT) 55 MCG/ACT nasal inhaler Spray 1 Spray in nose every day.     • Probiotic Product (PROBIOTIC DAILY PO) Take  by mouth.       No facility-administered encounter medications on file as of 8/31/2020.      Allergies   Allergen Reactions   • Gluten Meal      Belching, burping, bloating, \"stomach churning\"     DIET AND EXERCISE:  Weight Change: continued decrease  BMI Readings from Last 4 Encounters:   05/27/20 23.21 kg/m²   05/23/19 22.51 kg/m²   05/18/18 23.78 kg/m²   03/21/18 24.87 kg/m²      Diet: gluten-free with high volume veg/fruits due to celiac dz   Exercise: moderate regular exercise program     Review of Systems   Constitutional: Negative for chills, fever and malaise/fatigue.   HENT: Negative for nosebleeds.    Eyes: Negative for blurred vision and double vision.   Respiratory: Negative for cough, hemoptysis, shortness of breath and wheezing.    Cardiovascular: Negative for chest pain, palpitations and leg swelling.   Gastrointestinal: Negative for abdominal pain, blood in stool, diarrhea, nausea and vomiting.   Genitourinary: Negative for hematuria.   Musculoskeletal: Negative for joint pain and myalgias.   Skin: Negative for itching and rash.   Neurological: Negative for dizziness, tremors, focal weakness, seizures, weakness and headaches.   Endo/Heme/Allergies: Does not bruise/bleed easily.        Objective:     There were no vitals filed for this visit.   BP Readings from Last 4 Encounters:   05/27/20 124/65   05/23/19 125/70   05/18/18 117/66   03/21/18 144/71     There is no height or weight on file to calculate BMI.  Physical Exam   Constitutional: She is oriented to person, place, and time. She appears well-developed and well-nourished. No distress.   HENT:   Head: Normocephalic and atraumatic.   Eyes: Pupils are equal, round, and reactive to light. " Conjunctivae and EOM are normal. No scleral icterus.   Neck: Normal range of motion. Neck supple. No JVD present. No thyromegaly present.   Cardiovascular: Normal rate, regular rhythm, normal heart sounds and intact distal pulses. Exam reveals no gallop and no friction rub.   No murmur heard.  Pulses:       Carotid pulses are 2+ on the right side and 2+ on the left side.       Dorsalis pedis pulses are 2+ on the right side and 2+ on the left side.        Posterior tibial pulses are 2+ on the right side and 2+ on the left side.   No bruits appreciated in abdomen    Pulmonary/Chest: Effort normal and breath sounds normal. No respiratory distress. She has no wheezes. She has no rales. She exhibits no tenderness.   Musculoskeletal: Normal range of motion.         General: No tenderness or edema.   Neurological: She is alert and oriented to person, place, and time. She has normal reflexes. No cranial nerve deficit. Coordination normal.   Skin: Skin is warm and dry. She is not diaphoretic. No pallor.   Psychiatric: She has a normal mood and affect.   Vitals reviewed.    Lab Results   Component Value Date    CHOLSTRLTOT 158 11/19/2019    LDL 83 11/19/2019    HDL 52 11/19/2019    TRIGLYCERIDE 116 11/19/2019           Lab Results   Component Value Date    SODIUM 140 11/19/2019    POTASSIUM 3.8 11/19/2019    CHLORIDE 107 11/19/2019    CO2 24 11/19/2019    GLUCOSE 81 11/19/2019    BUN 15 11/19/2019    CREATININE 0.76 11/19/2019    IFAFRICA >60 11/19/2019    IFNOTAFR >60 11/19/2019        Lab Results   Component Value Date    WBC 5.6 11/19/2019    RBC 4.50 11/19/2019    HEMOGLOBIN 14.4 11/19/2019    HEMATOCRIT 42.5 11/19/2019    MCV 94.4 11/19/2019    MCH 32.0 11/19/2019    MCHC 33.9 11/19/2019    MPV 10.7 11/19/2019      CTA abdomen aug 2016:  1.  There is no change in 2 small splenic artery aneurysms measuring 10-11 mm in diameter with minimal peripheral thrombus and some peripheral calcification.  2.  There is mild  atherosclerosis of the abdominal aorta.  3.  There is normal variant branching at the celiac axis with the common hepatic artery originating directly from the abdominal aorta at the level of the celiac axis.  4.  There may be a small hiatal hernia again seen    Stress MPI Jan 2017:  NUCLEAR IMAGING INTERPRETATION   No evidence of significant jeopardized viable myocardium or prior myocardial    infarction.   Normal left ventricular wall motion.  LV ejection fraction = 77%.   ECG INTERPRETATION   Negative stress ECG for ischemia.    Carotid duplex april 2017:  1.  There is a mild amount of atherosclerotic plaque.  Plaque is located in carotid bulbs and proximal internal and external carotid arteries.  Plaque characterization:  Calcific and irregular  2.  There is no evidence of carotid occlusion.  3. Vertebral arteries demonstrate antegrade flow.  4. Diameter reduction in the internal carotid arteries: less than 50%. There is no hemodynamically significant stenosis.    MRI head april 2017:  MRI of the brain without and with contrast within normal limits.    CTA abd/pelvis 4/8/19  1.  There is no interval change in 2 small splenic artery aneurysms each measuring 10-11 mm in diameter with minimal peripheral thrombus and calcification.  2.  There is mild atherosclerosis of the abdominal aorta.  3.  There are no suspicious parenchymal organ findings.  Variant anatomy with the common hepatic artery originating directly from the aorta is again noted. Celiac axis and SMA are patent. CYNTHIA is patent. Both renal arteries are patent  .  Total Vascular screening 5/22/20    1. Mild atherosclerosis as detailed above.   2. No significant stenosis seen.    Medical Decision Making:  Today's Assessment / Status / Plan:     1. Splenic artery aneurysm (HCC)     2. Long term (current) use of antithrombotics/antiplatelets     3. Dyslipidemia     4. Carotid atherosclerosis, bilateral       Patient Type: Primary Prevention.    Etiology of  Established CVD if Present:   1) Mild carotid atherosclerosis, asymptomatic, no previous intervention  2) Splenic artery aneurysm - stable over time    Lipid Management: Qualifies for Statin Therapy Based on 2013 ACC/AHA Guidelines: yes  Calculated 10-Year Risk of ASCVD: 10.4%  Currently on Statin:  Crestor 5mg 3x/week  Given subclinical atherosclerosis and FH would like to treat as high risk with goal LDL-C <70 and nonHDL <100.    Lp(a) normal.   - currently at goal:  Yes   Plan:  - continue zetia 10mg daily   - Increase crestor 5 mg to 4x per week as she was not at goal at 3 days a week  - stop and call if any myalgias or abdominal symptoms  - continue gluten-free diet    Blood Pressure Management: ACC/AHA (2017) goal <130/80  Home BP at goal: yes  Office BP at goal:  no  Echo: not done   ACR: note done   Plan:   Monitoring:   - continue home BP monitoring, reviewed correct technique:  Yes   - order 24h ABPM:  NO  Medications: no meds indicated at this time   -Monitor and record home BP and bring into next visit      Glycemic Status: Normal    Anti-Platelet/Anti-Coagulant Tx: yes  - continue low dose asa    Smoking: continued complete avoidance of all tobacco products     Physical Activity: continue healthy activity to improve CV fitness, see care instructions for additional details     Weight Management and Nutrition: Dietary plan was discussed with patient at this visit including DASH, low sodium and/or as outlined in care instructions       Other:     1) Mild carotid atherosclerosis, asymptomatic, no previous intervention.  - Continue medical management.    - Repeat duplex every 3 years or so (next in 2023) or with symptoms     2) Splenic artery aneurysm - stable over time.    - Repeat CTA every 3 yrs (2022)    Instructed to follow-up with PCP for remainder of adult medical needs: yes  We will partner with other providers in the management of established vascular disease and cardiometabolic risk  factors.    Studies to Be Obtained: 1) CTA abdomen (4/2022), 2) carotid duplex (4/2023) vascular scan   Labs to Be Obtained: cmp, lipid prior to next visit     Follow up in: 4 months  JERZY Earl      Cc:  ANJEL Bean MD

## 2020-08-28 ENCOUNTER — HOSPITAL ENCOUNTER (OUTPATIENT)
Dept: LAB | Facility: MEDICAL CENTER | Age: 70
End: 2020-08-28
Attending: NURSE PRACTITIONER
Payer: MEDICARE

## 2020-08-28 ENCOUNTER — HOSPITAL ENCOUNTER (OUTPATIENT)
Dept: LAB | Facility: MEDICAL CENTER | Age: 70
End: 2020-08-28
Attending: FAMILY MEDICINE
Payer: MEDICARE

## 2020-08-28 DIAGNOSIS — I65.23 CAROTID ATHEROSCLEROSIS, BILATERAL: ICD-10-CM

## 2020-08-28 DIAGNOSIS — E78.5 DYSLIPIDEMIA: ICD-10-CM

## 2020-08-28 DIAGNOSIS — Z79.02 LONG TERM (CURRENT) USE OF ANTITHROMBOTICS/ANTIPLATELETS: ICD-10-CM

## 2020-08-28 DIAGNOSIS — I72.8 ANEURYSM OF SPLENIC ARTERY (HCC): ICD-10-CM

## 2020-08-28 LAB
25(OH)D3 SERPL-MCNC: 53 NG/ML (ref 30–100)
ALBUMIN SERPL BCP-MCNC: 4.2 G/DL (ref 3.2–4.9)
ALBUMIN/GLOB SERPL: 1.6 G/DL
ALP SERPL-CCNC: 81 U/L (ref 30–99)
ALT SERPL-CCNC: 19 U/L (ref 2–50)
ANION GAP SERPL CALC-SCNC: 11 MMOL/L (ref 7–16)
AST SERPL-CCNC: 19 U/L (ref 12–45)
BASOPHILS # BLD AUTO: 0.9 % (ref 0–1.8)
BASOPHILS # BLD: 0.05 K/UL (ref 0–0.12)
BILIRUB SERPL-MCNC: 0.5 MG/DL (ref 0.1–1.5)
BUN SERPL-MCNC: 14 MG/DL (ref 8–22)
CALCIUM SERPL-MCNC: 9.6 MG/DL (ref 8.5–10.5)
CHLORIDE SERPL-SCNC: 105 MMOL/L (ref 96–112)
CHOLEST SERPL-MCNC: 151 MG/DL (ref 100–199)
CO2 SERPL-SCNC: 24 MMOL/L (ref 20–33)
CREAT SERPL-MCNC: 0.71 MG/DL (ref 0.5–1.4)
EOSINOPHIL # BLD AUTO: 0.13 K/UL (ref 0–0.51)
EOSINOPHIL NFR BLD: 2.2 % (ref 0–6.9)
ERYTHROCYTE [DISTWIDTH] IN BLOOD BY AUTOMATED COUNT: 45 FL (ref 35.9–50)
FASTING STATUS PATIENT QL REPORTED: NORMAL
GLOBULIN SER CALC-MCNC: 2.6 G/DL (ref 1.9–3.5)
GLUCOSE SERPL-MCNC: 81 MG/DL (ref 65–99)
HCT VFR BLD AUTO: 44.5 % (ref 37–47)
HDLC SERPL-MCNC: 59 MG/DL
HGB BLD-MCNC: 14.6 G/DL (ref 12–16)
IMM GRANULOCYTES # BLD AUTO: 0.02 K/UL (ref 0–0.11)
IMM GRANULOCYTES NFR BLD AUTO: 0.3 % (ref 0–0.9)
LDLC SERPL CALC-MCNC: 64 MG/DL
LYMPHOCYTES # BLD AUTO: 1.93 K/UL (ref 1–4.8)
LYMPHOCYTES NFR BLD: 33 % (ref 22–41)
MCH RBC QN AUTO: 31.6 PG (ref 27–33)
MCHC RBC AUTO-ENTMCNC: 32.8 G/DL (ref 33.6–35)
MCV RBC AUTO: 96.3 FL (ref 81.4–97.8)
MONOCYTES # BLD AUTO: 0.46 K/UL (ref 0–0.85)
MONOCYTES NFR BLD AUTO: 7.9 % (ref 0–13.4)
NEUTROPHILS # BLD AUTO: 3.25 K/UL (ref 2–7.15)
NEUTROPHILS NFR BLD: 55.7 % (ref 44–72)
NRBC # BLD AUTO: 0 K/UL
NRBC BLD-RTO: 0 /100 WBC
PLATELET # BLD AUTO: 275 K/UL (ref 164–446)
PMV BLD AUTO: 10.8 FL (ref 9–12.9)
POTASSIUM SERPL-SCNC: 4 MMOL/L (ref 3.6–5.5)
PROT SERPL-MCNC: 6.8 G/DL (ref 6–8.2)
RBC # BLD AUTO: 4.62 M/UL (ref 4.2–5.4)
SODIUM SERPL-SCNC: 140 MMOL/L (ref 135–145)
T4 FREE SERPL-MCNC: 1.26 NG/DL (ref 0.93–1.7)
TRIGL SERPL-MCNC: 139 MG/DL (ref 0–149)
TSH SERPL DL<=0.005 MIU/L-ACNC: 2.04 UIU/ML (ref 0.38–5.33)
WBC # BLD AUTO: 5.8 K/UL (ref 4.8–10.8)

## 2020-08-28 PROCEDURE — 82306 VITAMIN D 25 HYDROXY: CPT

## 2020-08-28 PROCEDURE — 85025 COMPLETE CBC W/AUTO DIFF WBC: CPT

## 2020-08-28 PROCEDURE — 84439 ASSAY OF FREE THYROXINE: CPT

## 2020-08-28 PROCEDURE — 36415 COLL VENOUS BLD VENIPUNCTURE: CPT

## 2020-08-28 PROCEDURE — 80053 COMPREHEN METABOLIC PANEL: CPT

## 2020-08-28 PROCEDURE — 84443 ASSAY THYROID STIM HORMONE: CPT

## 2020-08-28 PROCEDURE — 80061 LIPID PANEL: CPT

## 2020-08-31 ENCOUNTER — OFFICE VISIT (OUTPATIENT)
Dept: VASCULAR LAB | Facility: MEDICAL CENTER | Age: 70
End: 2020-08-31
Attending: INTERNAL MEDICINE
Payer: MEDICARE

## 2020-08-31 VITALS
HEART RATE: 73 BPM | DIASTOLIC BLOOD PRESSURE: 87 MMHG | WEIGHT: 146 LBS | BODY MASS INDEX: 23.46 KG/M2 | HEIGHT: 66 IN | SYSTOLIC BLOOD PRESSURE: 135 MMHG

## 2020-08-31 DIAGNOSIS — I72.8 ANEURYSM OF SPLENIC ARTERY (HCC): ICD-10-CM

## 2020-08-31 DIAGNOSIS — I65.23 CAROTID ATHEROSCLEROSIS, BILATERAL: ICD-10-CM

## 2020-08-31 DIAGNOSIS — Z79.02 LONG TERM (CURRENT) USE OF ANTITHROMBOTICS/ANTIPLATELETS: ICD-10-CM

## 2020-08-31 DIAGNOSIS — E78.5 DYSLIPIDEMIA: ICD-10-CM

## 2020-08-31 PROCEDURE — 99214 OFFICE O/P EST MOD 30 MIN: CPT | Performed by: NURSE PRACTITIONER

## 2020-08-31 PROCEDURE — 99212 OFFICE O/P EST SF 10 MIN: CPT | Performed by: NURSE PRACTITIONER

## 2020-08-31 ASSESSMENT — ENCOUNTER SYMPTOMS
BLURRED VISION: 0
DOUBLE VISION: 0

## 2020-08-31 ASSESSMENT — FIBROSIS 4 INDEX: FIB4 SCORE: 1.14

## 2020-09-15 RX ORDER — EZETIMIBE 10 MG/1
TABLET ORAL
Qty: 90 TAB | Refills: 1 | Status: SHIPPED | OUTPATIENT
Start: 2020-09-15 | End: 2021-03-16

## 2020-10-27 NOTE — Clinical Note
Infusion Services   [address] [place] [address]  ABRAHAM Garcia [address]  Phone: [phone]  Fax: [phone]              Dear Dr Bean,    Your patient, Isabel Peralta (: 1950), was scheduled at [facility].  Isabel Lilly's encounter diagnosis is:  No diagnosis found.  She arrived for her appointment, and  the scheduled treatment was   given. These medications were administered to the patient: We administered ibandronate..  Isabel Lilly tolerated treatment.. In addition, the following labs were drawn    Recent Results (from the past 24 hour(s))   ISTAT CREATININE    Collection Time: 17 10:53 AM   Result Value Ref Range    Istat Creatinine 0.6 0.5 - 1.4 mg/dL   ISTAT IONIZED CA    Collection Time: 17 10:53 AM   Result Value Ref Range    Istat Ionized Calcium 1.13 1.10 - 1.30 mmol/L            Her next appointment is rescheduled for 10/5/2017.  We will need a new order for her October appointment as we used the one refill today.    For more information, you may review the nurse's progress notes in chart review under the notes section.       Sincerely,  Janette Gusman R.N.    
Declines

## 2020-11-18 ENCOUNTER — APPOINTMENT (RX ONLY)
Dept: URBAN - METROPOLITAN AREA CLINIC 35 | Facility: CLINIC | Age: 70
Setting detail: DERMATOLOGY
End: 2020-11-18

## 2020-11-18 DIAGNOSIS — D485 NEOPLASM OF UNCERTAIN BEHAVIOR OF SKIN: ICD-10-CM

## 2020-11-18 PROBLEM — D48.5 NEOPLASM OF UNCERTAIN BEHAVIOR OF SKIN: Status: ACTIVE | Noted: 2020-11-18

## 2020-11-18 PROCEDURE — ? BIOPSY BY SHAVE METHOD

## 2020-11-18 PROCEDURE — 11102 TANGNTL BX SKIN SINGLE LES: CPT

## 2020-11-18 ASSESSMENT — LOCATION SIMPLE DESCRIPTION DERM: LOCATION SIMPLE: CHEST

## 2020-11-18 ASSESSMENT — LOCATION ZONE DERM: LOCATION ZONE: TRUNK

## 2020-11-18 ASSESSMENT — LOCATION DETAILED DESCRIPTION DERM: LOCATION DETAILED: LEFT LATERAL SUPERIOR CHEST

## 2020-11-18 NOTE — PROCEDURE: BIOPSY BY SHAVE METHOD
Detail Level: Detailed
Depth Of Biopsy: dermis
Was A Bandage Applied: Yes
Size Of Lesion In Cm: 0
Biopsy Type: H and E
Biopsy Method: Dermablade
Anesthesia Type: 1% lidocaine with 1:100,000 epinephrine and a 1:12 solution of 8.4% sodium bicarbonate
Anesthesia Volume In Cc: 0.4
Hemostasis: Aluminum Chloride
Wound Care: Petrolatum
Dressing: Band-Aid
Destruction After The Procedure: No
Type Of Destruction Used: Curettage
Curettage Text: The wound bed was treated with curettage after the biopsy was performed.
Electrodesiccation And Curettage Text: The wound bed was treated with electrodesiccation and curettage after the biopsy was
Lab: [id number]
Lab Facility: 
Consent: Written consent was obtained and risks were reviewed including but not limited to scarring, infection, bleeding, scabbing, incomplete removal, nerve damage and allergy to anesthesia.
Post-Care Instructions: I reviewed with the patient in detail post-care instructions. Patient is to keep the biopsy site dry overnight, and then apply white petrolatum twice daily until healed. Patient may apply hydrogen peroxide soaks to remove any crusting.
Notification Instructions: Patient will be notified of biopsy results. However, patient instructed to call the office if not contacted within 2 weeks.
Billing Type: Third-Party Bill
Information: Selecting Yes will display possible errors in your note based on the variables you have selected. This validation is only offered as a suggestion for you. PLEASE NOTE THAT THE VALIDATION TEXT WILL BE REMOVED WHEN YOU FINALIZE YOUR NOTE. IF YOU WANT TO FAX A PRELIMINARY NOTE YOU WILL NEED TO TOGGLE THIS TO 'NO' IF YOU DO NOT WANT IT IN YOUR FAXED NOTE.

## 2020-11-30 ENCOUNTER — HOSPITAL ENCOUNTER (EMERGENCY)
Facility: MEDICAL CENTER | Age: 70
End: 2020-11-30
Attending: EMERGENCY MEDICINE
Payer: MEDICARE

## 2020-11-30 ENCOUNTER — APPOINTMENT (OUTPATIENT)
Dept: RADIOLOGY | Facility: MEDICAL CENTER | Age: 70
End: 2020-11-30
Attending: EMERGENCY MEDICINE
Payer: MEDICARE

## 2020-11-30 VITALS
HEART RATE: 75 BPM | WEIGHT: 145.5 LBS | RESPIRATION RATE: 15 BRPM | TEMPERATURE: 98.4 F | DIASTOLIC BLOOD PRESSURE: 72 MMHG | OXYGEN SATURATION: 96 % | BODY MASS INDEX: 23.38 KG/M2 | HEIGHT: 66 IN | SYSTOLIC BLOOD PRESSURE: 140 MMHG

## 2020-11-30 DIAGNOSIS — R00.2 PALPITATIONS: ICD-10-CM

## 2020-11-30 LAB
ALBUMIN SERPL BCP-MCNC: 4.1 G/DL (ref 3.2–4.9)
ALBUMIN/GLOB SERPL: 1.2 G/DL
ALP SERPL-CCNC: 110 U/L (ref 30–99)
ALT SERPL-CCNC: 22 U/L (ref 2–50)
ANION GAP SERPL CALC-SCNC: 15 MMOL/L (ref 7–16)
AST SERPL-CCNC: 20 U/L (ref 12–45)
BASOPHILS # BLD AUTO: 0.8 % (ref 0–1.8)
BASOPHILS # BLD: 0.07 K/UL (ref 0–0.12)
BILIRUB SERPL-MCNC: 0.4 MG/DL (ref 0.1–1.5)
BUN SERPL-MCNC: 16 MG/DL (ref 8–22)
CALCIUM SERPL-MCNC: 9.5 MG/DL (ref 8.4–10.2)
CHLORIDE SERPL-SCNC: 107 MMOL/L (ref 96–112)
CO2 SERPL-SCNC: 21 MMOL/L (ref 20–33)
CREAT SERPL-MCNC: 0.74 MG/DL (ref 0.5–1.4)
EKG IMPRESSION: NORMAL
EOSINOPHIL # BLD AUTO: 0.1 K/UL (ref 0–0.51)
EOSINOPHIL NFR BLD: 1.1 % (ref 0–6.9)
ERYTHROCYTE [DISTWIDTH] IN BLOOD BY AUTOMATED COUNT: 44.1 FL (ref 35.9–50)
GLOBULIN SER CALC-MCNC: 3.3 G/DL (ref 1.9–3.5)
GLUCOSE SERPL-MCNC: 120 MG/DL (ref 65–99)
HCT VFR BLD AUTO: 45.7 % (ref 37–47)
HGB BLD-MCNC: 15.4 G/DL (ref 12–16)
IMM GRANULOCYTES # BLD AUTO: 0.05 K/UL (ref 0–0.11)
IMM GRANULOCYTES NFR BLD AUTO: 0.5 % (ref 0–0.9)
LYMPHOCYTES # BLD AUTO: 1.63 K/UL (ref 1–4.8)
LYMPHOCYTES NFR BLD: 17.8 % (ref 22–41)
MAGNESIUM SERPL-MCNC: 2.1 MG/DL (ref 1.5–2.5)
MCH RBC QN AUTO: 31.7 PG (ref 27–33)
MCHC RBC AUTO-ENTMCNC: 33.7 G/DL (ref 33.6–35)
MCV RBC AUTO: 94 FL (ref 81.4–97.8)
MONOCYTES # BLD AUTO: 0.64 K/UL (ref 0–0.85)
MONOCYTES NFR BLD AUTO: 7 % (ref 0–13.4)
NEUTROPHILS # BLD AUTO: 6.65 K/UL (ref 2–7.15)
NEUTROPHILS NFR BLD: 72.8 % (ref 44–72)
NRBC # BLD AUTO: 0 K/UL
NRBC BLD-RTO: 0 /100 WBC
PLATELET # BLD AUTO: 297 K/UL (ref 164–446)
PMV BLD AUTO: 10 FL (ref 9–12.9)
POTASSIUM SERPL-SCNC: 3.3 MMOL/L (ref 3.6–5.5)
PROT SERPL-MCNC: 7.4 G/DL (ref 6–8.2)
RBC # BLD AUTO: 4.86 M/UL (ref 4.2–5.4)
SODIUM SERPL-SCNC: 143 MMOL/L (ref 135–145)
T4 FREE SERPL-MCNC: 1.51 NG/DL (ref 0.93–1.7)
TROPONIN T SERPL-MCNC: 8 NG/L (ref 6–19)
TSH SERPL DL<=0.005 MIU/L-ACNC: 7.19 UIU/ML (ref 0.38–5.33)
WBC # BLD AUTO: 9.1 K/UL (ref 4.8–10.8)

## 2020-11-30 PROCEDURE — 71045 X-RAY EXAM CHEST 1 VIEW: CPT

## 2020-11-30 PROCEDURE — 85025 COMPLETE CBC W/AUTO DIFF WBC: CPT

## 2020-11-30 PROCEDURE — 83735 ASSAY OF MAGNESIUM: CPT

## 2020-11-30 PROCEDURE — 80053 COMPREHEN METABOLIC PANEL: CPT

## 2020-11-30 PROCEDURE — 84439 ASSAY OF FREE THYROXINE: CPT

## 2020-11-30 PROCEDURE — 93005 ELECTROCARDIOGRAM TRACING: CPT | Performed by: EMERGENCY MEDICINE

## 2020-11-30 PROCEDURE — 99283 EMERGENCY DEPT VISIT LOW MDM: CPT

## 2020-11-30 PROCEDURE — 84484 ASSAY OF TROPONIN QUANT: CPT

## 2020-11-30 PROCEDURE — 84443 ASSAY THYROID STIM HORMONE: CPT

## 2020-11-30 PROCEDURE — 36415 COLL VENOUS BLD VENIPUNCTURE: CPT

## 2020-11-30 ASSESSMENT — FIBROSIS 4 INDEX: FIB4 SCORE: 1.14

## 2020-11-30 NOTE — ED NOTES
Pt discharged home per provider in stable condition. Paperwork provided to pt via RN and discharge instructions went over with by RN - pt verbalized understanding of teaching with no questions or concerns and is A/Ox4, VSS. Paperwork in hand on discharge.    Pt given discharge papers and went over with by this RN - pt verbalized understanding with no questions or concerns. Paperwork in hand on discharge. Ambulatory out with significant other. No new prescriptions.

## 2020-11-30 NOTE — ED TRIAGE NOTES
"Pt comes in w/   Was suddenly awoken tonight with her heart racing  No Hx of such   \"pounding in chest \"  Call [facility] and EKG was done   Now SR  Feeling out of sorts now    "

## 2020-11-30 NOTE — ED PROVIDER NOTES
ED Provider Note    CHIEF COMPLAINT  Chief Complaint   Patient presents with   • Rapid Heart Beat     pt comes in c/o being awoken to chest pounding and heart racing around 2300 tonight   no Hx of such  lasted about a an hour and a half        HPI    Primary care provider: Cari Bean M.D.   History obtained from: Patient and   History limited by: None     Isabel Peralta is a 70 y.o. female who presents to the ED with  complaining of rapid heartbeat that woke patient from sleep around 10:00 tonight.  Patient states that she was just about to fall asleep when she awoke feeling like her heart was beating very fast.  She had a home blood pressure monitor and her initial reading was 154/98 with a pulse of 73 and a repeat reading of 192/122 with a pulse of 133.  She is feeling better now.  She reports feeling hot flashes while her heart rate was fast.  She denies associated dizziness/pain/shortness of breath or difficulty breathing.  She denies recent illness/fever/congestion/sore throat/cough/nausea/vomiting/diarrhea/dysuria/rash/swelling.    REVIEW OF SYSTEMS  Please see HPI for pertinent positives/negatives.  All other systems reviewed and are negative.     PAST MEDICAL HISTORY  Past Medical History:   Diagnosis Date   • Anemia     history of   • Anesthesia     post op nausea   • Cancer (HCC)     basal cell skin cancer   • Carotid atherosclerosis, bilateral    • Celiac disease    • Heart burn    • High cholesterol    • Pain     right shoulder   • Splenic artery aneurysm (HCC)         SURGICAL HISTORY  Past Surgical History:   Procedure Laterality Date   • COLONOSCOPY - ENDO N/A 7/22/2015    Procedure: COLONOSCOPY - ENDO;  Surgeon: Jef Saavedra M.D.;  Location: SURGERY [facility];  Service:    • SHOULDER DECOMPRESSION ARTHROSCOPIC Right 5/7/2015    Procedure: SHOULDER DECOMPRESSION ARTHROSCOPIC;  Surgeon: Rohan Campos M.D.;  Location: SURGERY [facility];  Service:   "  • HARDWARE REMOVAL ORTHO Right 2015    Procedure: HARDWARE REMOVAL ORTHO;  Surgeon: Rohan Campos M.D.;  Location: SURGERY [facility];  Service:    • SHOULDER ARTHROSCOPY W/ ROTATOR CUFF REPAIR Right 2015    Procedure: SHOULDER ARTHROSCOPY W/ ROTATOR CUFF REPAIR;  Surgeon: Rohan Campos M.D.;  Location: SURGERY [facility];  Service:    • SHOULDER ARTHROSCOPY W/ ROTATOR CUFF REPAIR  2007    right   • HYSTERECTOMY, TOTAL ABDOMINAL     • OTHER ABDOMINAL SURGERY      perforated small intestine   • PRIMARY C SECTION  ,,            SOCIAL HISTORY  Social History     Tobacco Use   • Smoking status: Former Smoker     Packs/day: 0.50     Years: 4.00     Pack years: 2.00     Quit date: 1971     Years since quittin.9   • Smokeless tobacco: Never Used   Substance and Sexual Activity   • Alcohol use: No   • Drug use: No   • Sexual activity: Not on file        FAMILY HISTORY  Family History   Problem Relation Age of Onset   • Heart Disease Mother         pci in 60s, cabg in 70s   • Hypertension Mother    • Hyperlipidemia Mother         CURRENT MEDICATIONS  Home Medications     Reviewed by Yumiko Waldron R.N. (Registered Nurse) on 20 at 0059  Med List Status: Partial   Medication Last Dose Status   aspirin 81 MG tablet 2020 Active   Calcium Carbonate-Vitamin D (CALCIUM-VITAMIN D3 PO)  Active   estradiol (VAGIFEM) 10 MCG TABS 2020 Active   ezetimibe (ZETIA) 10 MG Tab 2020 Active   FIBER ADULT GUMMIES PO 2020 Active   ibuprofen (MOTRIN) 200 MG TABS 2020 Active   pantoprazole (PROTONIX) 40 MG TBEC 2020 Active   Probiotic Product (PROBIOTIC DAILY PO) 2020 Active   rosuvastatin (CRESTOR) 5 MG Tab 2020 Active   triamcinolone (NASACORT) 55 MCG/ACT nasal inhaler 2020 Active                 ALLERGIES  Allergies   Allergen Reactions   • Gluten Meal      Belching, burping, bloating, \"stomach churning\"        PHYSICAL " "EXAM  VITAL SIGNS: /72   Pulse 75   Temp 36.9 °C (98.4 °F) (Temporal)   Resp 15   Ht 1.676 m (5' 6\")   Wt 66 kg (145 lb 8.1 oz)   SpO2 96%   BMI 23.48 kg/m²  @YULIANA[[id number]::@     Pulse ox interpretation: 98% I interpret this pulse ox as normal     Cardiac monitor interpretation: Sinus rhythm with heart rate in the 80s as interpreted by me.  The patient presented with palpitations and cardiac monitor was ordered to monitor for dysrhythmia.    Constitutional: Well developed, well nourished, alert in no apparent distress, nontoxic appearance    HENT: No external signs of trauma, normocephalic, mask on due to COVID-19 pandemic  Eyes: PERRL, conjunctiva without erythema, no discharge, no icterus    Neck: Soft and supple, trachea midline, no stridor, no tenderness, no LAD, no JVD, good ROM    Cardiovascular: Regular rate and rhythm, no murmurs/rubs/gallops, strong distal pulses and good perfusion    Thorax & Lungs: No respiratory distress, CTAB   Abdomen: Soft, nontender, nondistended, no guarding, no rebound, normal BS    Back: No CVAT    Extremities: No cyanosis, no edema, no gross deformity, good ROM, no tenderness, intact distal pulses with brisk cap refill    Skin: Warm, dry, no pallor/cyanosis, no rash noted    Lymphatic: No lymphadenopathy noted    Neuro: A/O times 3, no focal deficits noted    Psychiatric: Cooperative, normal mood and affect, normal judgement, appropriate for clinical situation        DIAGNOSTIC STUDIES / PROCEDURES    EKG  12 Lead EKG obtained at 0045 and interpreted by me:   Rate: 81   Rhythm: Sinus rhythm   Ectopy: None  Intervals: Normal   QRS: Late precordial R wave transition  ST segments: Normal  T Waves: Normal    Clinical Impression: Sinus rhythm without acute ischemic changes or dysrhythmia       LABS  All labs reviewed by me.     Results for orders placed or performed during the hospital encounter of 11/30/20   CBC WITH DIFFERENTIAL   Result Value Ref Range    WBC 9.1 4.8 - " 10.8 K/uL    RBC 4.86 4.20 - 5.40 M/uL    Hemoglobin 15.4 12.0 - 16.0 g/dL    Hematocrit 45.7 37.0 - 47.0 %    MCV 94.0 81.4 - 97.8 fL    MCH 31.7 27.0 - 33.0 pg    MCHC 33.7 33.6 - 35.0 g/dL    RDW 44.1 35.9 - 50.0 fL    Platelet Count 297 164 - 446 K/uL    MPV 10.0 9.0 - 12.9 fL    Neutrophils-Polys 72.80 (H) 44.00 - 72.00 %    Lymphocytes 17.80 (L) 22.00 - 41.00 %    Monocytes 7.00 0.00 - 13.40 %    Eosinophils 1.10 0.00 - 6.90 %    Basophils 0.80 0.00 - 1.80 %    Immature Granulocytes 0.50 0.00 - 0.90 %    Nucleated RBC 0.00 /100 WBC    Neutrophils (Absolute) 6.65 2.00 - 7.15 K/uL    Lymphs (Absolute) 1.63 1.00 - 4.80 K/uL    Monos (Absolute) 0.64 0.00 - 0.85 K/uL    Eos (Absolute) 0.10 0.00 - 0.51 K/uL    Baso (Absolute) 0.07 0.00 - 0.12 K/uL    Immature Granulocytes (abs) 0.05 0.00 - 0.11 K/uL    NRBC (Absolute) 0.00 K/uL   COMP METABOLIC PANEL   Result Value Ref Range    Sodium 143 135 - 145 mmol/L    Potassium 3.3 (L) 3.6 - 5.5 mmol/L    Chloride 107 96 - 112 mmol/L    Co2 21 20 - 33 mmol/L    Anion Gap 15.0 7.0 - 16.0    Glucose 120 (H) 65 - 99 mg/dL    Bun 16 8 - 22 mg/dL    Creatinine 0.74 0.50 - 1.40 mg/dL    Calcium 9.5 8.4 - 10.2 mg/dL    AST(SGOT) 20 12 - 45 U/L    ALT(SGPT) 22 2 - 50 U/L    Alkaline Phosphatase 110 (H) 30 - 99 U/L    Total Bilirubin 0.4 0.1 - 1.5 mg/dL    Albumin 4.1 3.2 - 4.9 g/dL    Total Protein 7.4 6.0 - 8.2 g/dL    Globulin 3.3 1.9 - 3.5 g/dL    A-G Ratio 1.2 g/dL   TROPONIN   Result Value Ref Range    Troponin T 8 6 - 19 ng/L   TSH   Result Value Ref Range    TSH 7.190 (H) 0.380 - 5.330 uIU/mL   MAGNESIUM   Result Value Ref Range    Magnesium 2.1 1.5 - 2.5 mg/dL   ESTIMATED GFR   Result Value Ref Range    GFR If African American >60 >60 mL/min/1.73 m 2    GFR If Non African American >60 >60 mL/min/1.73 m 2   FREE THYROXINE   Result Value Ref Range    Free T-4 1.51 0.93 - 1.70 ng/dL   EKG   Result Value Ref Range    Report       [facility] Emergency  Dept.    Test Date:  2020  Pt Name:    LARY KOTHARI     Department: [facility]  MRN:        [id number]                      Room:       -ROOM 4  Gender:     Female                       Technician: ANDREIA  :        [id number]                   Requested By:KENTRELL RENEE  Order #:    [id number]                    Reading MD:    Measurements  Intervals                                Axis  Rate:       81                           P:          50  MI:         164                          QRS:        -12  QRSD:       107                          T:          29  QT:         411  QTc:        477    Interpretive Statements  Sinus rhythm  Compared to ECG 2015 10:13:03  No significant changes          RADIOLOGY  The radiologist's interpretation of all radiological studies have been reviewed by me.     DX-CHEST-PORTABLE (1 VIEW)   Final Result         1.  No acute cardiopulmonary disease.             COURSE & MEDICAL DECISION MAKING  Nursing notes, VS, PMSFHx reviewed in chart.     Review of past medical records shows the patient without recent visits to this ED.      Differential diagnoses considered include but are not limited to: Atrial fib/flutter, SVT, ventricular tachycardia, WPW, Brugada sydrome, prolonged QT syndrome, PAC, PVC, AMI, CHF, valvular heart disease, thyroid disorder, electrolyte abnormality, anxiety       History and physical exam as above.  EKG without evidence for acute ischemic changes or dysrhythmia.  Labs are fairly unremarkable except for mild hypokalemia.  TSH was elevated but free T4 was in normal range.  Chest x-ray without evidence for acute abnormality.  Patient was monitored in the ED and remained clinically stable.  Vital signs were unremarkable except for elevated blood pressure for which she can follow-up on outpatient basis for further management.  No evidence for worrisome dysrhythmia.  I discussed the findings with the patient and .  This is a very pleasant patient in  no acute distress and nontoxic in appearance with a benign exam.  She was advised on outpatient follow-up for further evaluation and given return to ED precautions.  Patient verbalized understanding and agreed with plan of care with no further questions or concerns.      The patient is referred to a primary physician for blood pressure management, diabetic screening, and for all other preventative health concerns.       FINAL IMPRESSION  1. Palpitations Acute          DISPOSITION  Patient will be discharged home in stable condition.       FOLLOW UP  Cari Bean M.D.  [address] [address]  [place] [address]  [phone]    Call today      [facility], Emergency Dept  [address]  [place] [id number] [phone]    If symptoms worsen         OUTPATIENT MEDICATIONS  Discharge Medication List as of 11/30/2020  3:45 AM             Electronically signed by: Zechariah Lau D.O., 11/30/2020 2:00 AM      Portions of this record were made with voice recognition software.  Despite my review, spelling/grammar/context errors may still remain.  Interpretation of this chart should be taken in this context.

## 2020-12-15 ENCOUNTER — TELEPHONE (OUTPATIENT)
Dept: CARDIOLOGY | Facility: MEDICAL CENTER | Age: 70
End: 2020-12-15

## 2020-12-15 NOTE — TELEPHONE ENCOUNTER
Called pt, s/w her , informed him that it is unlikely pt will be able to get it before 1/5, but the next soonest availability will be fine.

## 2020-12-15 NOTE — TELEPHONE ENCOUNTER
TO: 1p/Tue/Ofc  NM: Isabel Gomez   PH: [phone]   PT NM: Isabel Gomez    : 46   REG DR: Dr Epperson   RE: Wants to know if she should have  Zio patch done before 2021  appointment    DISP HIST: 12/15/2020 12:33P CC p/disp    New pt appt  w/ Dr GABRIEL

## 2020-12-21 ENCOUNTER — HOSPITAL ENCOUNTER (OUTPATIENT)
Dept: LAB | Facility: MEDICAL CENTER | Age: 70
End: 2020-12-21
Attending: NURSE PRACTITIONER
Payer: MEDICARE

## 2020-12-21 DIAGNOSIS — I72.8 ANEURYSM OF SPLENIC ARTERY (HCC): ICD-10-CM

## 2020-12-21 DIAGNOSIS — I65.23 CAROTID ATHEROSCLEROSIS, BILATERAL: ICD-10-CM

## 2020-12-21 DIAGNOSIS — Z79.02 LONG TERM (CURRENT) USE OF ANTITHROMBOTICS/ANTIPLATELETS: ICD-10-CM

## 2020-12-21 LAB
ALBUMIN SERPL BCP-MCNC: 4.1 G/DL (ref 3.2–4.9)
ALBUMIN/GLOB SERPL: 1.5 G/DL
ALP SERPL-CCNC: 90 U/L (ref 30–99)
ALT SERPL-CCNC: 17 U/L (ref 2–50)
ANION GAP SERPL CALC-SCNC: 7 MMOL/L (ref 7–16)
AST SERPL-CCNC: 19 U/L (ref 12–45)
BILIRUB SERPL-MCNC: 0.5 MG/DL (ref 0.1–1.5)
BUN SERPL-MCNC: 17 MG/DL (ref 8–22)
CALCIUM SERPL-MCNC: 9.3 MG/DL (ref 8.5–10.5)
CHLORIDE SERPL-SCNC: 108 MMOL/L (ref 96–112)
CHOLEST SERPL-MCNC: 124 MG/DL (ref 100–199)
CO2 SERPL-SCNC: 24 MMOL/L (ref 20–33)
CREAT SERPL-MCNC: 0.71 MG/DL (ref 0.5–1.4)
FASTING STATUS PATIENT QL REPORTED: NORMAL
GLOBULIN SER CALC-MCNC: 2.7 G/DL (ref 1.9–3.5)
GLUCOSE SERPL-MCNC: 81 MG/DL (ref 65–99)
HDLC SERPL-MCNC: 52 MG/DL
LDLC SERPL CALC-MCNC: 54 MG/DL
POTASSIUM SERPL-SCNC: 3.9 MMOL/L (ref 3.6–5.5)
PROT SERPL-MCNC: 6.8 G/DL (ref 6–8.2)
SODIUM SERPL-SCNC: 139 MMOL/L (ref 135–145)
TRIGL SERPL-MCNC: 90 MG/DL (ref 0–149)

## 2020-12-21 PROCEDURE — 80061 LIPID PANEL: CPT

## 2020-12-21 PROCEDURE — 80053 COMPREHEN METABOLIC PANEL: CPT

## 2020-12-21 PROCEDURE — 36415 COLL VENOUS BLD VENIPUNCTURE: CPT

## 2020-12-22 ASSESSMENT — ENCOUNTER SYMPTOMS
VOMITING: 0
FOCAL WEAKNESS: 0
TREMORS: 0
WHEEZING: 0
SHORTNESS OF BREATH: 0
HEADACHES: 0
MYALGIAS: 0
DOUBLE VISION: 0
FEVER: 0
BLOOD IN STOOL: 0
DIARRHEA: 0
COUGH: 0
WEAKNESS: 0
SEIZURES: 0
HEMOPTYSIS: 0
ABDOMINAL PAIN: 0
CHILLS: 0
DIZZINESS: 0
BLURRED VISION: 0
NAUSEA: 0
BRUISES/BLEEDS EASILY: 0

## 2020-12-23 NOTE — PROGRESS NOTES
FOLLOW-UP VASCULAR VISIT  Subjective:   Isabel Peralta is a 70 y.o. female who presents today 20 for   Chief Complaint   Patient presents with   • Office Visit     f/u - aneurysm, cholesterol        HPI:    Hyperlipidemia:    Stable, no current concerns  Current treatment: rosuva 5mg 4 times a week and zetia   Myalgias? No  Other adverse drug reactions? No  Lipid profile:     Lab Results  Component Value Date/Time   CHOLSTRLTOT 143 2018 0949   TRIGLYCERIDE 113 2018 0949   HDL 51 2018 0949   LDL 69 2018 0949     LDL (mg/dL)   Date Value   2018 69   05/10/2018 71     HDL (mg/dL)   Date Value   2018 51   05/10/2018 51     Carotid artery disease:   No h/o tia or cva symptoms including presyncope, syncope or HA.    Splenic a. Aneurysm - denies LUQ pain or dullness, stable on prior serial exams.    Antiplat: Taking ASA w/o bleeding issues.     Social History     Tobacco Use   Smoking Status Former Smoker   • Packs/day: 0.50   • Years: 4.00   • Pack years: 2.00   • Quit date: 1971   • Years since quittin.0   Smokeless Tobacco Never Used     Social History     Tobacco Use   • Smoking status: Former Smoker     Packs/day: 0.50     Years: 4.00     Pack years: 2.00     Quit date: 1971     Years since quittin.0   • Smokeless tobacco: Never Used   Substance Use Topics   • Alcohol use: No   • Drug use: No     Outpatient Encounter Medications as of 2020   Medication Sig Dispense Refill   • ezetimibe (ZETIA) 10 MG Tab Take 1 tablet by mouth once daily 90 Tab 1   • Calcium Carbonate-Vitamin D (CALCIUM-VITAMIN D3 PO) Take  by mouth.     • FIBER ADULT GUMMIES PO Take 5 g by mouth.     • rosuvastatin (CRESTOR) 5 MG Tab TAKE 1 TABLET BY MOUTH ONCE DAILY IN THE EVENING (Patient taking differently: Take 5 mg by mouth 3 times a week. Taking ) 90 Tab 3   • ibuprofen (MOTRIN) 200 MG TABS Take 200 mg by mouth every 6 hours as needed.     • estradiol (VAGIFEM) 10  "MCG TABS Insert 10 mcg in vagina. Twice a week     • aspirin 81 MG tablet Take 81 mg by mouth every day.     • triamcinolone (NASACORT) 55 MCG/ACT nasal inhaler Spray 1 Spray in nose every day.     • [DISCONTINUED] pantoprazole (PROTONIX) 40 MG TBEC Take 40 mg by mouth every day.     • [DISCONTINUED] Probiotic Product (PROBIOTIC DAILY PO) Take  by mouth.       No facility-administered encounter medications on file as of 12/28/2020.      Allergies   Allergen Reactions   • Gluten Meal      Belching, burping, bloating, \"stomach churning\"     DIET AND EXERCISE:  Weight Change: continued decrease  BMI Readings from Last 4 Encounters:   12/28/20 23.40 kg/m²   11/30/20 23.48 kg/m²   08/31/20 23.57 kg/m²   05/27/20 23.21 kg/m²      Diet: gluten-free with high volume veg/fruits due to celiac dz   Exercise: moderate regular exercise program     Review of Systems   Constitutional: Negative for chills, fever and malaise/fatigue.   HENT: Negative for nosebleeds.    Eyes: Negative for blurred vision and double vision.   Respiratory: Negative for cough, hemoptysis, shortness of breath and wheezing.    Cardiovascular: Positive for palpitations. Negative for chest pain and leg swelling.   Gastrointestinal: Negative for abdominal pain, blood in stool, diarrhea, nausea and vomiting.   Genitourinary: Negative for hematuria.   Musculoskeletal: Negative for joint pain and myalgias.   Skin: Negative for itching and rash.   Neurological: Negative for dizziness, tremors, focal weakness, seizures, weakness and headaches.   Endo/Heme/Allergies: Does not bruise/bleed easily.        Objective:     Vitals:    12/28/20 1003   BP: 125/77   BP Location: Left arm   Patient Position: Sitting   BP Cuff Size: Adult   Pulse: 89   Weight: 65.8 kg (145 lb)   Height: 1.676 m (5' 6\")      BP Readings from Last 4 Encounters:   12/28/20 125/77   11/30/20 140/72   08/31/20 135/87   05/27/20 124/65     Body mass index is 23.4 kg/m².  Physical Exam "   Constitutional: She is oriented to person, place, and time. She appears well-developed and well-nourished. No distress.   HENT:   Head: Normocephalic and atraumatic.   Eyes: Pupils are equal, round, and reactive to light. Conjunctivae and EOM are normal. No scleral icterus.   Neck: Normal range of motion. Neck supple. No JVD present. No thyromegaly present.   Cardiovascular: Normal rate, regular rhythm, normal heart sounds and intact distal pulses. Exam reveals no gallop and no friction rub.   No murmur heard.  Pulses:       Carotid pulses are 2+ on the right side and 2+ on the left side.       Dorsalis pedis pulses are 2+ on the right side and 2+ on the left side.        Posterior tibial pulses are 2+ on the right side and 2+ on the left side.   Pulmonary/Chest: Effort normal and breath sounds normal. No respiratory distress. She has no wheezes. She has no rales. She exhibits no tenderness.   Musculoskeletal: Normal range of motion.         General: No tenderness or edema.   Neurological: She is alert and oriented to person, place, and time. She has normal reflexes. No cranial nerve deficit. Coordination normal.   Skin: Skin is warm and dry. She is not diaphoretic. No pallor.   Psychiatric: She has a normal mood and affect.   Vitals reviewed.    Lab Results   Component Value Date    CHOLSTRLTOT 124 12/21/2020    LDL 54 12/21/2020    HDL 52 12/21/2020    TRIGLYCERIDE 90 12/21/2020           Lab Results   Component Value Date    SODIUM 139 12/21/2020    POTASSIUM 3.9 12/21/2020    CHLORIDE 108 12/21/2020    CO2 24 12/21/2020    GLUCOSE 81 12/21/2020    BUN 17 12/21/2020    CREATININE 0.71 12/21/2020    IFAFR[place] >60 12/21/2020    IFNOTAFR >60 12/21/2020        Lab Results   Component Value Date    WBC 9.1 11/30/2020    RBC 4.86 11/30/2020    HEMOGLOBIN 15.4 11/30/2020    HEMATOCRIT 45.7 11/30/2020    MCV 94.0 11/30/2020    MCH 31.7 11/30/2020    MCHC 33.7 11/30/2020    MPV 10.0 11/30/2020      CTA abdomen aug  2016:  1.  There is no change in 2 small splenic artery aneurysms measuring 10-11 mm in diameter with minimal peripheral thrombus and some peripheral calcification.  2.  There is mild atherosclerosis of the abdominal aorta.  3.  There is normal variant branching at the celiac axis with the common hepatic artery originating directly from the abdominal aorta at the level of the celiac axis.  4.  There may be a small hiatal hernia again seen    Stress MPI Jan 2017:  NUCLEAR IMAGING INTERPRETATION   No evidence of significant jeopardized viable myocardium or prior myocardial    infarction.   Normal left ventricular wall motion.  LV ejection fraction = 77%.   ECG INTERPRETATION   Negative stress ECG for ischemia.    Carotid duplex april 2017:  1.  There is a mild amount of atherosclerotic plaque.  Plaque is located in carotid bulbs and proximal internal and external carotid arteries.  Plaque characterization:  Calcific and irregular  2.  There is no evidence of carotid occlusion.  3. Vertebral arteries demonstrate antegrade flow.  4. Diameter reduction in the internal carotid arteries: less than 50%. There is no hemodynamically significant stenosis.    MRI head april 2017:  MRI of the brain without and with contrast within normal limits.    CTA abd/pelvis 4/8/19  1.  There is no interval change in 2 small splenic artery aneurysms each measuring 10-11 mm in diameter with minimal peripheral thrombus and calcification.  2.  There is mild atherosclerosis of the abdominal aorta.  3.  There are no suspicious parenchymal organ findings.  Variant anatomy with the common hepatic artery originating directly from the aorta is again noted. Celiac axis and SMA are patent. CYNTHIA is patent. Both renal arteries are patent  .  Total Vascular screening 5/22/20    1. Mild atherosclerosis as detailed above.   2. No significant stenosis seen.    Medical Decision Making:  Today's Assessment / Status / Plan:     1. Carotid atherosclerosis,  bilateral  REFERRAL TO VASCULAR MEDICINE   2. Dyslipidemia  REFERRAL TO VASCULAR MEDICINE   3. Long term (current) use of antithrombotics/antiplatelets  REFERRAL TO VASCULAR MEDICINE   4. Splenic artery aneurysm (HCC)  REFERRAL TO VASCULAR MEDICINE     Patient Type: Primary Prevention.    Etiology of Established CVD if Present:   1) Mild carotid atherosclerosis, asymptomatic, no previous intervention  2) Splenic artery aneurysm - stable over time    Lipid Management: Qualifies for Statin Therapy Based on 2013 ACC/AHA Guidelines: yes  Calculated 10-Year Risk of ASCVD: 10.4%  Currently on Statin:  Crestor 5mg 3x/week  Given subclinical atherosclerosis and FH would like to treat as high risk with goal LDL-C <70 and nonHDL <100.    Lp(a) normal.   - currently at goal:  Yes   Plan:  - continue zetia 10mg daily   - Increase crestor 5 mg to 4x per week as she was not at goal at 3 days a week  - stop and call if any myalgias or abdominal symptoms  - continue gluten-free diet    Blood Pressure Management: ACC/AHA (2017) goal <130/80  Home BP at goal: yes  Office BP at goal:  Yes  Echo: not done   ACR: note done   Plan:   Monitoring:   - continue home BP monitoring, reviewed correct technique:  Yes   - order 24h ABPM:  NO  Medications: no meds indicated at this time   -Monitor and record home BP and bring into next visit      Glycemic Status: Normal    Anti-Platelet/Anti-Coagulant Tx: yes  - continue low dose asa    Smoking: continued complete avoidance of all tobacco products     Physical Activity: continue healthy activity to improve CV fitness, see care instructions for additional details     Weight Management and Nutrition: Dietary plan was discussed with patient at this visit including DASH, low sodium and/or as outlined in care instructions       Other:     1) Mild carotid atherosclerosis, asymptomatic, no previous intervention.  - Continue medical management.    - Repeat duplex every 3 years or so (next in 2023) or  with symptoms     2) Splenic artery aneurysm - stable over time.    - Repeat CTA every 3 yrs (2022)    3) palpitations - Nov 29 and had an ER visit- Referral to cardiology and echo done today      Instructed to follow-up with PCP for remainder of adult medical needs: yes  We will partner with other providers in the management of established vascular disease and cardiometabolic risk factors.    Studies to Be Obtained: 1) CTA abdomen (4/2022),   2) carotid duplex (4/2023) vascular scan   Labs to Be Obtained: cmp, lipid prior to next visit     Follow up in: 4 months  NOAH Earl.      Cc:  ANJEL Bean MD

## 2020-12-27 DIAGNOSIS — E78.5 DYSLIPIDEMIA: ICD-10-CM

## 2020-12-28 ENCOUNTER — OFFICE VISIT (OUTPATIENT)
Dept: VASCULAR LAB | Facility: MEDICAL CENTER | Age: 70
End: 2020-12-28
Attending: INTERNAL MEDICINE
Payer: MEDICARE

## 2020-12-28 ENCOUNTER — HOSPITAL ENCOUNTER (OUTPATIENT)
Dept: CARDIOLOGY | Facility: MEDICAL CENTER | Age: 70
End: 2020-12-28
Attending: FAMILY MEDICINE
Payer: MEDICARE

## 2020-12-28 ENCOUNTER — TELEPHONE (OUTPATIENT)
Dept: CARDIOLOGY | Facility: MEDICAL CENTER | Age: 70
End: 2020-12-28

## 2020-12-28 VITALS
WEIGHT: 145 LBS | HEIGHT: 66 IN | DIASTOLIC BLOOD PRESSURE: 77 MMHG | HEART RATE: 89 BPM | SYSTOLIC BLOOD PRESSURE: 125 MMHG | BODY MASS INDEX: 23.3 KG/M2

## 2020-12-28 DIAGNOSIS — Z79.02 LONG TERM (CURRENT) USE OF ANTITHROMBOTICS/ANTIPLATELETS: ICD-10-CM

## 2020-12-28 DIAGNOSIS — R00.2 PALPITATIONS: ICD-10-CM

## 2020-12-28 DIAGNOSIS — I65.23 CAROTID ATHEROSCLEROSIS, BILATERAL: ICD-10-CM

## 2020-12-28 DIAGNOSIS — I72.8 ANEURYSM OF SPLENIC ARTERY (HCC): ICD-10-CM

## 2020-12-28 DIAGNOSIS — E78.5 DYSLIPIDEMIA: ICD-10-CM

## 2020-12-28 LAB
LV EJECT FRACT  99904: 60
LV EJECT FRACT MOD 2C 99903: 45.9
LV EJECT FRACT MOD 4C 99902: 65.75
LV EJECT FRACT MOD BP 99901: 59.9

## 2020-12-28 PROCEDURE — 99214 OFFICE O/P EST MOD 30 MIN: CPT | Performed by: NURSE PRACTITIONER

## 2020-12-28 PROCEDURE — 93306 TTE W/DOPPLER COMPLETE: CPT | Mod: 26 | Performed by: INTERNAL MEDICINE

## 2020-12-28 PROCEDURE — 99212 OFFICE O/P EST SF 10 MIN: CPT | Mod: 25 | Performed by: NURSE PRACTITIONER

## 2020-12-28 PROCEDURE — 93306 TTE W/DOPPLER COMPLETE: CPT

## 2020-12-28 RX ORDER — ROSUVASTATIN CALCIUM 5 MG/1
TABLET, COATED ORAL
Qty: 100 TAB | Refills: 0 | Status: SHIPPED | OUTPATIENT
Start: 2020-12-28 | End: 2021-06-22

## 2020-12-28 ASSESSMENT — ENCOUNTER SYMPTOMS: PALPITATIONS: 1

## 2020-12-28 ASSESSMENT — FIBROSIS 4 INDEX: FIB4 SCORE: 1.12

## 2020-12-29 NOTE — TELEPHONE ENCOUNTER
Spoke to patient in regards to records for NP appointment. Patient has never been treated by a cardiologist, all recent records in epic.

## 2021-01-05 ENCOUNTER — OFFICE VISIT (OUTPATIENT)
Dept: CARDIOLOGY | Facility: MEDICAL CENTER | Age: 71
End: 2021-01-05
Payer: MEDICARE

## 2021-01-05 VITALS
HEIGHT: 66 IN | WEIGHT: 147.2 LBS | DIASTOLIC BLOOD PRESSURE: 86 MMHG | RESPIRATION RATE: 16 BRPM | BODY MASS INDEX: 23.66 KG/M2 | OXYGEN SATURATION: 98 % | HEART RATE: 80 BPM | SYSTOLIC BLOOD PRESSURE: 128 MMHG

## 2021-01-05 DIAGNOSIS — R00.2 PALPITATIONS: ICD-10-CM

## 2021-01-05 PROCEDURE — 99204 OFFICE O/P NEW MOD 45 MIN: CPT | Performed by: INTERNAL MEDICINE

## 2021-01-05 RX ORDER — MULTIVIT WITH MINERALS/LUTEIN
2 TABLET ORAL DAILY
COMMUNITY
End: 2022-08-31

## 2021-01-05 SDOH — HEALTH STABILITY: MENTAL HEALTH: HOW OFTEN DO YOU HAVE A DRINK CONTAINING ALCOHOL?: MONTHLY OR LESS

## 2021-01-05 ASSESSMENT — ENCOUNTER SYMPTOMS
CHILLS: 0
ORTHOPNEA: 0
DIZZINESS: 0
FEVER: 0
ABDOMINAL PAIN: 0
PALPITATIONS: 1
HEARTBURN: 1
INSOMNIA: 0
LOSS OF CONSCIOUSNESS: 0
BRUISES/BLEEDS EASILY: 1
SHORTNESS OF BREATH: 0
MYALGIAS: 0
BLURRED VISION: 0
PND: 0

## 2021-01-05 ASSESSMENT — FIBROSIS 4 INDEX: FIB4 SCORE: 1.12
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
## 2021-01-11 NOTE — PROCEDURE: LIQUID NITROGEN
Render Post-Care Instructions In Note?: yes
Post-Care Instructions: I reviewed with the patient in detail post-care instructions. Patient is to wear sun protection and avoid picking at any of the treated lesions. Pt may apply Vaseline to crusted or scabbing areas.
Duration Of Freeze Thaw-Cycle (Seconds): 0
Detail Level: Detailed
Consent: The patient's consent was obtained including but not limited to risks of crusting, scabbing, blistering, scarring, darker or lighter pigmentary change, recurrence, incomplete removal and infection.
Medical Necessity Clause: This procedure was medically necessary because the lesions that were treated were: inflamed
Post-Care Instructions: I reviewed with the patient in detail post-care instructions. Patient is to avoid picking at any of the treated lesions. Pt may apply Vaseline to crusted or scabbing areas.
Medical Necessity Information: It is in your best interest to select a reason for this procedure from the list below. All of these items fulfill various CMS LCD requirements except the new and changing color options.
Number Of Freeze-Thaw Cycles: 3 freeze-thaw cycles
Add 52 Modifier (Optional): no
none

## 2021-01-13 ENCOUNTER — APPOINTMENT (RX ONLY)
Dept: URBAN - METROPOLITAN AREA CLINIC 35 | Facility: CLINIC | Age: 71
Setting detail: DERMATOLOGY
End: 2021-01-13

## 2021-01-13 PROBLEM — D04.5 CARCINOMA IN SITU OF SKIN OF TRUNK: Status: ACTIVE | Noted: 2021-01-13

## 2021-01-13 PROCEDURE — ? CURETTAGE AND DESTRUCTION

## 2021-01-13 PROCEDURE — 17262 DSTRJ MAL LES T/A/L 1.1-2.0: CPT

## 2021-01-13 NOTE — PROCEDURE: CURETTAGE AND DESTRUCTION
Detail Level: Detailed
Biopsy Photograph Reviewed: Yes
Number Of Curettages: 3
Size Of Lesion In Cm: 0.5
Size Of Lesion After Curettage: 1.2
Add Intralesional Injection: No
Total Volume (Ccs): 1
Anesthesia Type: 1% lidocaine with epinephrine and a 1:10 solution of 8.4% sodium bicarbonate
Anesthesia Volume In Cc: 1.5
Cautery Type: electrocautery (heat cautery)
What Was Performed First?: Curettage
Additional Information: (Optional): The wound was cleaned, and a pressure dressing was applied.  The patient received detailed post-op instructions.
Consent was obtained from the patient. The risks, benefits and alternatives to therapy were discussed in detail. Specifically, the risks of infection, scarring, bleeding, prolonged wound healing, nerve injury, incomplete removal, allergy to anesthesia and recurrence were addressed. Alternatives to ED&C, such as: surgical removal and XRT were also discussed.  Prior to the procedure, the treatment site was clearly identified and confirmed by the patient. All components of Universal Protocol/PAUSE Rule completed.
Post-Care Instructions: I reviewed with the patient in detail post-care instructions. Patient is to keep the area dry for 24 hours, and not to engage in any swimming until the area is healed. Should the patient develop any fevers, chills, bleeding, severe pain patient will contact the office immediately.
Bill As A Line Item Or As Units: Line Item

## 2021-01-15 DIAGNOSIS — Z23 NEED FOR VACCINATION: ICD-10-CM

## 2021-01-19 ENCOUNTER — APPOINTMENT (RX ONLY)
Dept: URBAN - METROPOLITAN AREA CLINIC 35 | Facility: CLINIC | Age: 71
Setting detail: DERMATOLOGY
End: 2021-01-19

## 2021-01-19 DIAGNOSIS — D18.0 HEMANGIOMA: ICD-10-CM

## 2021-01-19 DIAGNOSIS — Z85.828 PERSONAL HISTORY OF OTHER MALIGNANT NEOPLASM OF SKIN: ICD-10-CM

## 2021-01-19 DIAGNOSIS — L57.0 ACTINIC KERATOSIS: ICD-10-CM

## 2021-01-19 DIAGNOSIS — Z87.2 PERSONAL HISTORY OF DISEASES OF THE SKIN AND SUBCUTANEOUS TISSUE: ICD-10-CM

## 2021-01-19 DIAGNOSIS — L81.4 OTHER MELANIN HYPERPIGMENTATION: ICD-10-CM

## 2021-01-19 DIAGNOSIS — I78.8 OTHER DISEASES OF CAPILLARIES: ICD-10-CM

## 2021-01-19 DIAGNOSIS — Z71.89 OTHER SPECIFIED COUNSELING: ICD-10-CM

## 2021-01-19 DIAGNOSIS — L23.1 ALLERGIC CONTACT DERMATITIS DUE TO ADHESIVES: ICD-10-CM

## 2021-01-19 DIAGNOSIS — D17 BENIGN LIPOMATOUS NEOPLASM: ICD-10-CM

## 2021-01-19 DIAGNOSIS — L72.8 OTHER FOLLICULAR CYSTS OF THE SKIN AND SUBCUTANEOUS TISSUE: ICD-10-CM

## 2021-01-19 DIAGNOSIS — D485 NEOPLASM OF UNCERTAIN BEHAVIOR OF SKIN: ICD-10-CM

## 2021-01-19 DIAGNOSIS — L82.1 OTHER SEBORRHEIC KERATOSIS: ICD-10-CM

## 2021-01-19 PROBLEM — D48.5 NEOPLASM OF UNCERTAIN BEHAVIOR OF SKIN: Status: ACTIVE | Noted: 2021-01-19

## 2021-01-19 PROBLEM — D18.01 HEMANGIOMA OF SKIN AND SUBCUTANEOUS TISSUE: Status: ACTIVE | Noted: 2021-01-19

## 2021-01-19 PROBLEM — D17.24 BENIGN LIPOMATOUS NEOPLASM OF SKIN AND SUBCUTANEOUS TISSUE OF LEFT LEG: Status: ACTIVE | Noted: 2021-01-19

## 2021-01-19 PROBLEM — D17.21 BENIGN LIPOMATOUS NEOPLASM OF SKIN AND SUBCUTANEOUS TISSUE OF RIGHT ARM: Status: ACTIVE | Noted: 2021-01-19

## 2021-01-19 PROBLEM — D17.23 BENIGN LIPOMATOUS NEOPLASM OF SKIN AND SUBCUTANEOUS TISSUE OF RIGHT LEG: Status: ACTIVE | Noted: 2021-01-19

## 2021-01-19 PROBLEM — D23.5 OTHER BENIGN NEOPLASM OF SKIN OF TRUNK: Status: ACTIVE | Noted: 2021-01-19

## 2021-01-19 PROBLEM — D17.22 BENIGN LIPOMATOUS NEOPLASM OF SKIN AND SUBCUTANEOUS TISSUE OF LEFT ARM: Status: ACTIVE | Noted: 2021-01-19

## 2021-01-19 PROCEDURE — 17003 DESTRUCT PREMALG LES 2-14: CPT | Mod: 79

## 2021-01-19 PROCEDURE — ? SUNSCREEN RECOMMENDATIONS

## 2021-01-19 PROCEDURE — 99213 OFFICE O/P EST LOW 20 MIN: CPT | Mod: 24,25

## 2021-01-19 PROCEDURE — ? LIQUID NITROGEN

## 2021-01-19 PROCEDURE — 11104 PUNCH BX SKIN SINGLE LESION: CPT | Mod: 79

## 2021-01-19 PROCEDURE — 17000 DESTRUCT PREMALG LESION: CPT | Mod: 59,79

## 2021-01-19 PROCEDURE — ? LIQUID NITROGEN (COSMETIC)

## 2021-01-19 PROCEDURE — ? COUNSELING

## 2021-01-19 PROCEDURE — ? ADDITIONAL NOTES

## 2021-01-19 PROCEDURE — ? BIOPSY BY PUNCH METHOD

## 2021-01-19 ASSESSMENT — LOCATION DETAILED DESCRIPTION DERM
LOCATION DETAILED: RIGHT SUPERIOR LATERAL BUCCAL CHEEK
LOCATION DETAILED: LEFT LATERAL SUPERIOR CHEST
LOCATION DETAILED: LEFT ANTERIOR PROXIMAL THIGH
LOCATION DETAILED: LEFT LATERAL MALAR CHEEK
LOCATION DETAILED: LEFT INFERIOR UPPER BACK
LOCATION DETAILED: LEFT MID-UPPER BACK
LOCATION DETAILED: PERIUMBILICAL SKIN
LOCATION DETAILED: RIGHT ANTERIOR DISTAL THIGH
LOCATION DETAILED: LEFT CENTRAL MALAR CHEEK
LOCATION DETAILED: LEFT POSTERIOR SHOULDER
LOCATION DETAILED: RIGHT INFERIOR CENTRAL MALAR CHEEK
LOCATION DETAILED: RIGHT MEDIAL SUPERIOR CHEST
LOCATION DETAILED: LEFT INFERIOR CENTRAL MALAR CHEEK
LOCATION DETAILED: LEFT PROXIMAL PRETIBIAL REGION
LOCATION DETAILED: LEFT INFERIOR MEDIAL MALAR CHEEK
LOCATION DETAILED: LEFT MID PREAURICULAR CHEEK
LOCATION DETAILED: LEFT PROXIMAL DORSAL FOREARM
LOCATION DETAILED: RIGHT PROXIMAL DORSAL FOREARM

## 2021-01-19 ASSESSMENT — LOCATION SIMPLE DESCRIPTION DERM
LOCATION SIMPLE: LEFT FOREARM
LOCATION SIMPLE: ABDOMEN
LOCATION SIMPLE: LEFT CHEEK
LOCATION SIMPLE: LEFT SHOULDER
LOCATION SIMPLE: LEFT PRETIBIAL REGION
LOCATION SIMPLE: LEFT THIGH
LOCATION SIMPLE: RIGHT CHEEK
LOCATION SIMPLE: CHEST
LOCATION SIMPLE: RIGHT THIGH
LOCATION SIMPLE: LEFT UPPER BACK
LOCATION SIMPLE: RIGHT FOREARM

## 2021-01-19 ASSESSMENT — LOCATION ZONE DERM
LOCATION ZONE: TRUNK
LOCATION ZONE: LEG
LOCATION ZONE: FACE
LOCATION ZONE: ARM

## 2021-01-19 NOTE — PROCEDURE: COUNSELING
Detail Level: Detailed
Detail Level: Generalized
Detail Level: Zone
Patient Specific Counseling (Will Not Stick From Patient To Patient): Patients ED&C site looks good but she has dermatitis around it where the bandaid touches

## 2021-01-19 NOTE — PROCEDURE: LIQUID NITROGEN (COSMETIC)
Price (Use Numbers Only, No Special Characters Or $): 0.00
Consent: The patient's consent was obtained including but not limited to risks of crusting, scabbing, blistering, scarring, darker or lighter pigmentary change, recurrence, incomplete removal and infection. The patient understands that the procedure is cosmetic in nature and is not covered by insurance.
Post-Care Instructions: I reviewed with the patient in detail post-care instructions. Patient is to wear sunprotection, and avoid picking at any of the treated lesions. Pt may apply Vaseline to crusted or scabbing areas.
Detail Level: Detailed
Render Post-Care Instructions In Note?: no
Billing Information: Bill by Static Price

## 2021-01-19 NOTE — PROCEDURE: BIOPSY BY PUNCH METHOD
Detail Level: Detailed
Was A Bandage Applied: Yes
Punch Size In Mm: 6
Biopsy Type: H and E
Anesthesia Type: 1% lidocaine with epinephrine and a 1:10 solution of 8.4% sodium bicarbonate
Anesthesia Volume In Cc: 1
Additional Anesthesia Volume In Cc (Will Not Render If 0): 0
Hemostasis: None
Epidermal Sutures: 3-0 Ethilon
Wound Care: Petrolatum
Dressing: pressure dressing
Suture Removal: 7 days
Patient Will Remove Sutures At Home?: No
Lab: [id number]
Lab Facility: 
Consent: Written consent was obtained and risks were reviewed including but not limited to scarring, infection, bleeding, scabbing, incomplete removal, nerve damage and allergy to anesthesia.
Post-Care Instructions: I reviewed with the patient in detail post-care instructions.  Patient to cleanse area with mild soap and water, apply Vaseline to area x 14 days. Patient may apply hydrogen peroxide soaks to remove any crusting.
Home Suture Removal Text: Patient was provided a home suture removal kit and will remove their sutures at home.  If they have any questions or difficulties they will call the office.
Notification Instructions: Patient will be notified of biopsy results. However, patient instructed to call the office if not contacted within 2 weeks.
Billing Type: Third-Party Bill
Information: Selecting Yes will display possible errors in your note based on the variables you have selected. This validation is only offered as a suggestion for you. PLEASE NOTE THAT THE VALIDATION TEXT WILL BE REMOVED WHEN YOU FINALIZE YOUR NOTE. IF YOU WANT TO FAX A PRELIMINARY NOTE YOU WILL NEED TO TOGGLE THIS TO 'NO' IF YOU DO NOT WANT IT IN YOUR FAXED NOTE.

## 2021-02-02 ENCOUNTER — APPOINTMENT (RX ONLY)
Dept: URBAN - METROPOLITAN AREA CLINIC 35 | Facility: CLINIC | Age: 71
Setting detail: DERMATOLOGY
End: 2021-02-02

## 2021-02-02 DIAGNOSIS — Z48.02 ENCOUNTER FOR REMOVAL OF SUTURES: ICD-10-CM

## 2021-02-02 PROCEDURE — ? COUNSELING

## 2021-02-02 PROCEDURE — 99212 OFFICE O/P EST SF 10 MIN: CPT

## 2021-02-02 ASSESSMENT — LOCATION SIMPLE DESCRIPTION DERM: LOCATION SIMPLE: LEFT PRETIBIAL REGION

## 2021-02-02 ASSESSMENT — LOCATION DETAILED DESCRIPTION DERM: LOCATION DETAILED: LEFT PROXIMAL PRETIBIAL REGION

## 2021-02-02 ASSESSMENT — LOCATION ZONE DERM: LOCATION ZONE: LEG

## 2021-03-08 ENCOUNTER — APPOINTMENT (RX ONLY)
Dept: URBAN - METROPOLITAN AREA CLINIC 35 | Facility: CLINIC | Age: 71
Setting detail: DERMATOLOGY
End: 2021-03-08

## 2021-03-08 DIAGNOSIS — L98.8 OTHER SPECIFIED DISORDERS OF THE SKIN AND SUBCUTANEOUS TISSUE: ICD-10-CM | Status: WORSENING

## 2021-03-08 DIAGNOSIS — L82.1 OTHER SEBORRHEIC KERATOSIS: ICD-10-CM

## 2021-03-08 DIAGNOSIS — Z71.89 OTHER SPECIFIED COUNSELING: ICD-10-CM

## 2021-03-08 PROCEDURE — ? COUNSELING

## 2021-03-08 PROCEDURE — ? SUNSCREEN RECOMMENDATIONS

## 2021-03-08 PROCEDURE — ? LIQUID NITROGEN (COSMETIC)

## 2021-03-08 PROCEDURE — 99214 OFFICE O/P EST MOD 30 MIN: CPT

## 2021-03-08 PROCEDURE — ? PRESCRIPTION

## 2021-03-08 RX ORDER — BETAMETHASONE VALERATE 1 MG/G
1 OINTMENT TOPICAL DAILY
Qty: 1 | Refills: 3 | Status: ERX | COMMUNITY
Start: 2021-03-08

## 2021-03-08 RX ADMIN — BETAMETHASONE VALERATE 1: 1 OINTMENT TOPICAL at 00:00

## 2021-03-08 ASSESSMENT — LOCATION SIMPLE DESCRIPTION DERM
LOCATION SIMPLE: LOWER BACK
LOCATION SIMPLE: RIGHT THIGH
LOCATION SIMPLE: RIGHT LOWER BACK
LOCATION SIMPLE: LEFT CHEEK
LOCATION SIMPLE: LEFT LOWER BACK
LOCATION SIMPLE: RIGHT POSTERIOR THIGH
LOCATION SIMPLE: NECK
LOCATION SIMPLE: RIGHT PRETIBIAL REGION
LOCATION SIMPLE: RIGHT POPLITEAL SKIN
LOCATION SIMPLE: LEFT PRETIBIAL REGION
LOCATION SIMPLE: RIGHT ANTERIOR NECK
LOCATION SIMPLE: LEFT BREAST
LOCATION SIMPLE: ABDOMEN
LOCATION SIMPLE: RIGHT FOREHEAD

## 2021-03-08 ASSESSMENT — LOCATION ZONE DERM
LOCATION ZONE: FACE
LOCATION ZONE: NECK
LOCATION ZONE: LEG
LOCATION ZONE: TRUNK

## 2021-03-08 ASSESSMENT — LOCATION DETAILED DESCRIPTION DERM
LOCATION DETAILED: LEFT LATERAL MALAR CHEEK
LOCATION DETAILED: LEFT INFERIOR MEDIAL LOWER BACK
LOCATION DETAILED: LEFT INFERIOR MEDIAL MIDBACK
LOCATION DETAILED: RIGHT INFERIOR MEDIAL LOWER BACK
LOCATION DETAILED: LEFT MEDIAL MALAR CHEEK
LOCATION DETAILED: RIGHT DISTAL POSTERIOR THIGH
LOCATION DETAILED: SUPERIOR LUMBAR SPINE
LOCATION DETAILED: LEFT INFERIOR CENTRAL MALAR CHEEK
LOCATION DETAILED: LEFT LATERAL ABDOMEN
LOCATION DETAILED: LEFT INFERIOR LATERAL MIDBACK
LOCATION DETAILED: LEFT PROXIMAL PRETIBIAL REGION
LOCATION DETAILED: LEFT SUPERIOR MEDIAL LOWER BACK
LOCATION DETAILED: RIGHT ANTERIOR MEDIAL DISTAL THIGH
LOCATION DETAILED: PERIUMBILICAL SKIN
LOCATION DETAILED: RIGHT SUPERIOR LATERAL LOWER BACK
LOCATION DETAILED: RIGHT SUPERIOR LATERAL NECK
LOCATION DETAILED: RIGHT POPLITEAL SKIN
LOCATION DETAILED: RIGHT LATERAL FOREHEAD
LOCATION DETAILED: RIGHT DISTAL PRETIBIAL REGION
LOCATION DETAILED: LEFT LATERAL BREAST 2-3:00 REGION

## 2021-03-08 NOTE — PROCEDURE: LIQUID NITROGEN (COSMETIC)
Price (Use Numbers Only, No Special Characters Or $): 125.00
Render Post-Care Instructions In Note?: no
Detail Level: Detailed
Post-Care Instructions: I reviewed with the patient in detail post-care instructions. Patient is to wear sunprotection, and avoid picking at any of the treated lesions. Pt may apply Vaseline to crusted or scabbing areas.
Billing Information: Bill by Static Price
Consent: The patient's consent was obtained including but not limited to risks of crusting, scabbing, blistering, scarring, darker or lighter pigmentary change, recurrence, incomplete removal and infection. The patient understands that the procedure is cosmetic in nature and is not covered by insurance.

## 2021-03-16 DIAGNOSIS — E78.5 DYSLIPIDEMIA: ICD-10-CM

## 2021-03-16 RX ORDER — EZETIMIBE 10 MG/1
TABLET ORAL
Qty: 90 TABLET | Refills: 1 | Status: SHIPPED | OUTPATIENT
Start: 2021-03-16 | End: 2021-09-08

## 2021-05-18 ENCOUNTER — PATIENT MESSAGE (OUTPATIENT)
Dept: HEALTH INFORMATION MANAGEMENT | Facility: OTHER | Age: 71
End: 2021-05-18

## 2021-06-07 ENCOUNTER — HOSPITAL ENCOUNTER (OUTPATIENT)
Dept: LAB | Facility: MEDICAL CENTER | Age: 71
End: 2021-06-07
Attending: FAMILY MEDICINE
Payer: MEDICARE

## 2021-06-07 PROCEDURE — 36415 COLL VENOUS BLD VENIPUNCTURE: CPT

## 2021-06-07 PROCEDURE — 84550 ASSAY OF BLOOD/URIC ACID: CPT

## 2021-06-08 LAB — URATE SERPL-MCNC: 3.9 MG/DL (ref 1.9–8.2)

## 2021-06-09 ENCOUNTER — HOSPITAL ENCOUNTER (OUTPATIENT)
Dept: RADIOLOGY | Facility: MEDICAL CENTER | Age: 71
End: 2021-06-09
Attending: FAMILY MEDICINE
Payer: MEDICARE

## 2021-06-09 DIAGNOSIS — M79.674 PAIN IN TOE OF RIGHT FOOT: ICD-10-CM

## 2021-06-09 PROCEDURE — 73620 X-RAY EXAM OF FOOT: CPT | Mod: RT

## 2021-06-10 ENCOUNTER — PATIENT OUTREACH (OUTPATIENT)
Dept: HEALTH INFORMATION MANAGEMENT | Facility: OTHER | Age: 71
End: 2021-06-10

## 2021-06-10 NOTE — PROGRESS NOTES
Outcome: patient will call back to schedule   Please transfer to Patient Outreach Team at [phone] when patient returns call.        Attempt # 2

## 2021-06-14 ENCOUNTER — HOSPITAL ENCOUNTER (OUTPATIENT)
Dept: LAB | Facility: MEDICAL CENTER | Age: 71
End: 2021-06-14
Attending: NURSE PRACTITIONER
Payer: MEDICARE

## 2021-06-14 DIAGNOSIS — I65.23 CAROTID ATHEROSCLEROSIS, BILATERAL: ICD-10-CM

## 2021-06-14 LAB
ALBUMIN SERPL BCP-MCNC: 4.2 G/DL (ref 3.2–4.9)
ALBUMIN/GLOB SERPL: 1.4 G/DL
ALP SERPL-CCNC: 92 U/L (ref 30–99)
ALT SERPL-CCNC: 27 U/L (ref 2–50)
ANION GAP SERPL CALC-SCNC: 9 MMOL/L (ref 7–16)
AST SERPL-CCNC: 20 U/L (ref 12–45)
BILIRUB SERPL-MCNC: 0.6 MG/DL (ref 0.1–1.5)
BUN SERPL-MCNC: 14 MG/DL (ref 8–22)
CALCIUM SERPL-MCNC: 9.4 MG/DL (ref 8.5–10.5)
CHLORIDE SERPL-SCNC: 106 MMOL/L (ref 96–112)
CHOLEST SERPL-MCNC: 143 MG/DL (ref 100–199)
CO2 SERPL-SCNC: 23 MMOL/L (ref 20–33)
CREAT SERPL-MCNC: 0.6 MG/DL (ref 0.5–1.4)
CREAT UR-MCNC: 38.42 MG/DL
FASTING STATUS PATIENT QL REPORTED: NORMAL
GLOBULIN SER CALC-MCNC: 2.9 G/DL (ref 1.9–3.5)
GLUCOSE SERPL-MCNC: 85 MG/DL (ref 65–99)
HDLC SERPL-MCNC: 54 MG/DL
LDLC SERPL CALC-MCNC: 69 MG/DL
MICROALBUMIN UR-MCNC: <1.2 MG/DL
MICROALBUMIN/CREAT UR: NORMAL MG/G (ref 0–30)
POTASSIUM SERPL-SCNC: 3.8 MMOL/L (ref 3.6–5.5)
PROT SERPL-MCNC: 7.1 G/DL (ref 6–8.2)
SODIUM SERPL-SCNC: 138 MMOL/L (ref 135–145)
TRIGL SERPL-MCNC: 101 MG/DL (ref 0–149)

## 2021-06-14 PROCEDURE — 80053 COMPREHEN METABOLIC PANEL: CPT

## 2021-06-14 PROCEDURE — 36415 COLL VENOUS BLD VENIPUNCTURE: CPT

## 2021-06-14 PROCEDURE — 80061 LIPID PANEL: CPT

## 2021-06-14 PROCEDURE — 82570 ASSAY OF URINE CREATININE: CPT

## 2021-06-14 PROCEDURE — 82043 UR ALBUMIN QUANTITATIVE: CPT

## 2021-06-22 ENCOUNTER — OFFICE VISIT (OUTPATIENT)
Dept: VASCULAR LAB | Facility: MEDICAL CENTER | Age: 71
End: 2021-06-22
Attending: INTERNAL MEDICINE
Payer: MEDICARE

## 2021-06-22 VITALS
SYSTOLIC BLOOD PRESSURE: 115 MMHG | HEIGHT: 66 IN | DIASTOLIC BLOOD PRESSURE: 74 MMHG | BODY MASS INDEX: 23.78 KG/M2 | WEIGHT: 148 LBS | HEART RATE: 65 BPM

## 2021-06-22 DIAGNOSIS — E78.5 DYSLIPIDEMIA: ICD-10-CM

## 2021-06-22 DIAGNOSIS — Z79.02 LONG TERM (CURRENT) USE OF ANTITHROMBOTICS/ANTIPLATELETS: ICD-10-CM

## 2021-06-22 PROCEDURE — 99212 OFFICE O/P EST SF 10 MIN: CPT | Performed by: NURSE PRACTITIONER

## 2021-06-22 PROCEDURE — 99214 OFFICE O/P EST MOD 30 MIN: CPT | Performed by: NURSE PRACTITIONER

## 2021-06-22 RX ORDER — ROSUVASTATIN CALCIUM 5 MG/1
TABLET, COATED ORAL
Qty: 100 TABLET | Refills: 1 | Status: SHIPPED | OUTPATIENT
Start: 2021-06-22 | End: 2022-05-31

## 2021-06-22 ASSESSMENT — ENCOUNTER SYMPTOMS
SHORTNESS OF BREATH: 0
ABDOMINAL PAIN: 0
DOUBLE VISION: 0
DIZZINESS: 0
BRUISES/BLEEDS EASILY: 0
COUGH: 0
BLOOD IN STOOL: 0
HEMOPTYSIS: 0
SEIZURES: 0
MYALGIAS: 0
BLURRED VISION: 0
PALPITATIONS: 0
FOCAL WEAKNESS: 0
WEAKNESS: 0
TREMORS: 0
WHEEZING: 0
HEADACHES: 0

## 2021-06-22 ASSESSMENT — FIBROSIS 4 INDEX: FIB4 SCORE: 0.93

## 2021-06-22 NOTE — PROGRESS NOTES
FOLLOW-UP VASCULAR VISIT  Subjective:   Isabel Peralta is a 70 y.o. female who presents today 21 for   No chief complaint on file.      HPI:    Hyperlipidemia:    Stable, no current concerns  Current treatment: rosuva 5mg 4 times a week + Zetia daily  Myalgias? No  Other adverse drug reactions? No  Lipid profile: 21 NonHDL-89, LDL-69      Carotid artery disease:   No h/o tia or cva symptoms including presyncope, syncope or HA.    Splenic a. Aneurysm - denies LUQ pain or dullness, stable on prior serial exams.    Antiplat: Taking ASA w/o bleeding issues.     Social History     Tobacco Use   Smoking Status Former Smoker   • Packs/day: 0.50   • Years: 4.00   • Pack years: 2.00   • Quit date: 1971   • Years since quittin.5   Smokeless Tobacco Never Used   Tobacco Comment    quit 49 years ago     Social History     Tobacco Use   • Smoking status: Former Smoker     Packs/day: 0.50     Years: 4.00     Pack years: 2.00     Quit date: 1971     Years since quittin.5   • Smokeless tobacco: Never Used   • Tobacco comment: quit 49 years ago   Substance Use Topics   • Alcohol use: Yes     Comment: on the holidays   • Drug use: No     Outpatient Encounter Medications as of 2021   Medication Sig Dispense Refill   • ezetimibe (ZETIA) 10 MG Tab Take 1 tablet by mouth once daily 90 tablet 1   • Ascorbic Acid (VITAMIN C) 1000 MG Tab Take 2 Tabs by mouth every day.     • rosuvastatin (CRESTOR) 5 MG Tab TAKE 1 TABLET BY MOUTH ONCE DAILY IN THE EVENING 100 Tab 0   • Calcium Carbonate-Vitamin D (CALCIUM-VITAMIN D3 PO) Take  by mouth.     • FIBER ADULT GUMMIES PO Take 5 g by mouth.     • ibuprofen (MOTRIN) 200 MG TABS Take 200 mg by mouth every 6 hours as needed.     • estradiol (VAGIFEM) 10 MCG TABS Insert 10 mcg in vagina. Twice a week     • aspirin 81 MG tablet Take 81 mg by mouth every day.     • triamcinolone (NASACORT) 55 MCG/ACT nasal inhaler Spray 1 Spray in nose every day.       No  "facility-administered encounter medications on file as of 6/22/2021.     Allergies   Allergen Reactions   • Gluten Meal      Belching, burping, bloating, \"stomach churning\"     DIET AND EXERCISE:  Weight Change: stable  BMI Readings from Last 4 Encounters:   01/05/21 23.76 kg/m²   12/28/20 23.40 kg/m²   11/30/20 23.48 kg/m²   08/31/20 23.57 kg/m²      Diet: gluten-free with high volume veg/fruits due to celiac dz   Exercise: moderate regular exercise program     Review of Systems   Constitutional: Negative for malaise/fatigue.   HENT: Negative for nosebleeds.    Eyes: Negative for blurred vision and double vision.   Respiratory: Negative for cough, hemoptysis, shortness of breath and wheezing.    Cardiovascular: Negative for chest pain, palpitations and leg swelling.   Gastrointestinal: Negative for abdominal pain and blood in stool.   Genitourinary: Negative for hematuria.   Musculoskeletal: Negative for joint pain and myalgias.   Neurological: Negative for dizziness, tremors, focal weakness, seizures, weakness and headaches.   Endo/Heme/Allergies: Does not bruise/bleed easily.        Objective:     There were no vitals filed for this visit.   BP Readings from Last 4 Encounters:   01/05/21 128/86   12/28/20 125/77   11/30/20 140/72   08/31/20 135/87     There is no height or weight on file to calculate BMI.  Physical Exam  Vitals reviewed.   Constitutional:       General: She is not in acute distress.     Appearance: She is well-developed. She is not diaphoretic.   Neck:      Thyroid: No thyromegaly.      Vascular: No JVD.   Cardiovascular:      Rate and Rhythm: Normal rate and regular rhythm.      Heart sounds: Normal heart sounds. No murmur heard.     Pulmonary:      Effort: Pulmonary effort is normal. No respiratory distress.      Breath sounds: Normal breath sounds. No wheezing.   Musculoskeletal:         General: No swelling. Normal range of motion.      Cervical back: Normal range of motion and neck supple. "   Skin:     General: Skin is warm and dry.      Coloration: Skin is not pale.   Neurological:      Mental Status: She is alert and oriented to person, place, and time.      Coordination: Coordination normal.      Deep Tendon Reflexes: Reflexes are normal and symmetric.   Psychiatric:         Mood and Affect: Mood normal.         Behavior: Behavior normal.       Lab Results   Component Value Date    CHOLSTRLTOT 143 06/14/2021    LDL 69 06/14/2021    HDL 54 06/14/2021    TRIGLYCERIDE 101 06/14/2021           Lab Results   Component Value Date    SODIUM 138 06/14/2021    POTASSIUM 3.8 06/14/2021    CHLORIDE 106 06/14/2021    CO2 23 06/14/2021    GLUCOSE 85 06/14/2021    BUN 14 06/14/2021    CREATININE 0.60 06/14/2021    IFAFRICA >60 06/14/2021    IFNOTAFR >60 06/14/2021        Lab Results   Component Value Date    WBC 9.1 11/30/2020    RBC 4.86 11/30/2020    HEMOGLOBIN 15.4 11/30/2020    HEMATOCRIT 45.7 11/30/2020    MCV 94.0 11/30/2020    MCH 31.7 11/30/2020    MCHC 33.7 11/30/2020    MPV 10.0 11/30/2020      CTA abdomen aug 2016:  1.  There is no change in 2 small splenic artery aneurysms measuring 10-11 mm in diameter with minimal peripheral thrombus and some peripheral calcification.  2.  There is mild atherosclerosis of the abdominal aorta.  3.  There is normal variant branching at the celiac axis with the common hepatic artery originating directly from the abdominal aorta at the level of the celiac axis.  4.  There may be a small hiatal hernia again seen    Stress MPI Jan 2017:  NUCLEAR IMAGING INTERPRETATION   No evidence of significant jeopardized viable myocardium or prior myocardial    infarction.   Normal left ventricular wall motion.  LV ejection fraction = 77%.   ECG INTERPRETATION   Negative stress ECG for ischemia.    Carotid duplex april 2017:  1.  There is a mild amount of atherosclerotic plaque.  Plaque is located in carotid bulbs and proximal internal and external carotid arteries.  Plaque  characterization:  Calcific and irregular  2.  There is no evidence of carotid occlusion.  3. Vertebral arteries demonstrate antegrade flow.  4. Diameter reduction in the internal carotid arteries: less than 50%. There is no hemodynamically significant stenosis.    MRI head april 2017:  MRI of the brain without and with contrast within normal limits.    CTA abd/pelvis 4/8/19  1.  There is no interval change in 2 small splenic artery aneurysms each measuring 10-11 mm in diameter with minimal peripheral thrombus and calcification.  2.  There is mild atherosclerosis of the abdominal aorta.  3.  There are no suspicious parenchymal organ findings.  Variant anatomy with the common hepatic artery originating directly from the aorta is again noted. Celiac axis and SMA are patent. CYNTHIA is patent. Both renal arteries are patent  .  Total Vascular screening 5/22/20    1. Mild atherosclerosis as detailed above.   2. No significant stenosis seen.    Medical Decision Making:  Today's Assessment / Status / Plan:     No diagnosis found.  Patient Type: Primary Prevention.    Etiology of Established CVD if Present:   1) Mild carotid atherosclerosis, asymptomatic, no previous intervention  2) Splenic artery aneurysm - stable over time    Lipid Management: Qualifies for Statin Therapy Based on 2013 ACC/AHA Guidelines: yes  Calculated 10-Year Risk of ASCVD: 10.4%  Currently on Statin:  Crestor 5mg 4x/week  On Zetia daily  Given subclinical atherosclerosis and FH would like to treat as high risk with goal LDL-C <70 and nonHDL <100.    Lp(a) normal.   - currently at goal:  Yes   Plan:  - continue zetia 10mg daily   - continue Crestor 5 mg  4x per week   - stop and call if any myalgias or abdominal symptoms  - continue gluten-free diet  - follow bloodwork q6-12mo    Blood Pressure Management: ACC/AHA (2017) goal <130/80  Home BP at goal: yes  Office BP at goal:  Yes  Echo: not done   ACR: note done   Plan:   Monitoring:   - continue home BP  monitoring, reviewed correct technique:  Yes   - order 24h ABPM:  NO  Medications: no meds indicated at this time   -Monitor and record home BP and bring into next visit      Glycemic Status: Normal    Anti-Platelet/Anti-Coagulant Tx: yes  - continue low dose asa    Smoking: continued complete avoidance of all tobacco products     Physical Activity: continue healthy activity to improve CV fitness    Weight Management and Nutrition: Dietary plan was discussed with patient at this visit including DASH, low sodium       Other:     1) Mild carotid atherosclerosis, asymptomatic, no previous intervention.  - Continue medical management.    - Repeat duplex every 3 years or so (next in 2023) or with symptoms     2) Splenic artery aneurysm - stable over time.    - Repeat CTA every 3 yrs (2022)    3) palpitations - 1x event; no recurrence.  Had visit with cards, no monitor.    Instructed to follow-up with PCP for remainder of adult medical needs: yes  We will partner with other providers in the management of established vascular disease and cardiometabolic risk factors.    Studies to Be Obtained: 1) CTA abdomen (4/2022),   2) carotid duplex (4/2023) vascular scan   Labs to Be Obtained: cmp, lipid prior to next visit     Follow up in: 1yr  NOAH Lundberg.      Cc:  ANJEL Bean MD

## 2021-07-08 NOTE — PATIENT INSTRUCTIONS
"1) repeat carotid ultrasound prior to next year's appt - Franci will remind you   2) repeat labs in November - we will call results     General healthly nutrition advice:  - the USDA food pattern (https://www.cnpp.usda.gov/USDAFoodPatterns)  - plate method (https://www.choosemyplate.gov/)  - consume diet that emphasizes intake of vegetables, fruits, and whole grains,  - use low fat diary products, poultry, fish, legumes, nontropical vegetable oils, nuts  - limit intake of sweets, sugar-sweetned beverages, and red meats   - reduce saturated and trans fats to <6% of your daily calories   - consume no more than 2,400mg of sodium daily (look at food labels) or if you have high BP then reduce to no more than 1,500mg of sodium daily     BP lowering diet:   - DASH diet (https://www.heart.org/en/health-topics/high-blood-pressure/changes-you-can-make-to-manage-high-blood-pressure/managing-blood-pressure-with-a-heart-healthy-diet)    Diabetes diet:  - visit diabetes.org to review \"food and fitness\" section and \"Create your plate\" for plate method education (http://www.diabetes.org/food-and-fitness/)     Cholesterol-reducing diets:   - AHA diet  (http://www.heart.org/en/healthy-living/healthy-eating/eat-smart/nutrition-basics/aha-diet-and-lifestyle-recommendations)   - Mediterranean diet (http://www.heart.org/en/healthy-living/healthy-eating/eat-smart/nutrition-basics/mediterranean-diet)   - lowering triglycerides: (http://my.americanheart.org/idc/groups/ahamah-public/@wcm/@sop/@[facility]/documents/downloadable/[facility]_[email or web address].pdf)     - engage in moderate to vigorous physical activity such as brisk walking, swimming, cycling, >150 minutes per week at 30-40 minutes per session, 3 to 5 times weekly or as directed at today's visit       " normal...

## 2021-07-15 ENCOUNTER — HOSPITAL ENCOUNTER (OUTPATIENT)
Dept: RADIOLOGY | Facility: MEDICAL CENTER | Age: 71
End: 2021-07-15
Attending: FAMILY MEDICINE
Payer: MEDICARE

## 2021-07-15 DIAGNOSIS — Z12.31 VISIT FOR SCREENING MAMMOGRAM: ICD-10-CM

## 2021-07-15 PROCEDURE — 77063 BREAST TOMOSYNTHESIS BI: CPT

## 2021-07-20 ENCOUNTER — APPOINTMENT (RX ONLY)
Dept: URBAN - METROPOLITAN AREA CLINIC 35 | Facility: CLINIC | Age: 71
Setting detail: DERMATOLOGY
End: 2021-07-20

## 2021-07-20 DIAGNOSIS — D17 BENIGN LIPOMATOUS NEOPLASM: ICD-10-CM

## 2021-07-20 DIAGNOSIS — D22 MELANOCYTIC NEVI: ICD-10-CM

## 2021-07-20 DIAGNOSIS — Z71.89 OTHER SPECIFIED COUNSELING: ICD-10-CM

## 2021-07-20 DIAGNOSIS — L72.8 OTHER FOLLICULAR CYSTS OF THE SKIN AND SUBCUTANEOUS TISSUE: ICD-10-CM

## 2021-07-20 DIAGNOSIS — L82.1 OTHER SEBORRHEIC KERATOSIS: ICD-10-CM

## 2021-07-20 DIAGNOSIS — L81.4 OTHER MELANIN HYPERPIGMENTATION: ICD-10-CM

## 2021-07-20 DIAGNOSIS — D18.0 HEMANGIOMA: ICD-10-CM

## 2021-07-20 DIAGNOSIS — D485 NEOPLASM OF UNCERTAIN BEHAVIOR OF SKIN: ICD-10-CM | Status: INADEQUATELY CONTROLLED

## 2021-07-20 DIAGNOSIS — Z85.828 PERSONAL HISTORY OF OTHER MALIGNANT NEOPLASM OF SKIN: ICD-10-CM

## 2021-07-20 DIAGNOSIS — L91.8 OTHER HYPERTROPHIC DISORDERS OF THE SKIN: ICD-10-CM

## 2021-07-20 DIAGNOSIS — Z87.2 PERSONAL HISTORY OF DISEASES OF THE SKIN AND SUBCUTANEOUS TISSUE: ICD-10-CM

## 2021-07-20 DIAGNOSIS — I78.8 OTHER DISEASES OF CAPILLARIES: ICD-10-CM

## 2021-07-20 PROBLEM — D17.22 BENIGN LIPOMATOUS NEOPLASM OF SKIN AND SUBCUTANEOUS TISSUE OF LEFT ARM: Status: ACTIVE | Noted: 2021-07-20

## 2021-07-20 PROBLEM — D23.5 OTHER BENIGN NEOPLASM OF SKIN OF TRUNK: Status: ACTIVE | Noted: 2021-07-20

## 2021-07-20 PROBLEM — D17.23 BENIGN LIPOMATOUS NEOPLASM OF SKIN AND SUBCUTANEOUS TISSUE OF RIGHT LEG: Status: ACTIVE | Noted: 2021-07-20

## 2021-07-20 PROBLEM — D17.21 BENIGN LIPOMATOUS NEOPLASM OF SKIN AND SUBCUTANEOUS TISSUE OF RIGHT ARM: Status: ACTIVE | Noted: 2021-07-20

## 2021-07-20 PROBLEM — D48.5 NEOPLASM OF UNCERTAIN BEHAVIOR OF SKIN: Status: ACTIVE | Noted: 2021-07-20

## 2021-07-20 PROBLEM — D22.61 MELANOCYTIC NEVI OF RIGHT UPPER LIMB, INCLUDING SHOULDER: Status: ACTIVE | Noted: 2021-07-20

## 2021-07-20 PROBLEM — D17.24 BENIGN LIPOMATOUS NEOPLASM OF SKIN AND SUBCUTANEOUS TISSUE OF LEFT LEG: Status: ACTIVE | Noted: 2021-07-20

## 2021-07-20 PROBLEM — D18.01 HEMANGIOMA OF SKIN AND SUBCUTANEOUS TISSUE: Status: ACTIVE | Noted: 2021-07-20

## 2021-07-20 PROCEDURE — ? BIOPSY BY SHAVE METHOD

## 2021-07-20 PROCEDURE — 99214 OFFICE O/P EST MOD 30 MIN: CPT | Mod: 25

## 2021-07-20 PROCEDURE — 11102 TANGNTL BX SKIN SINGLE LES: CPT

## 2021-07-20 PROCEDURE — ? SURGICAL DECISION MAKING

## 2021-07-20 PROCEDURE — ? COUNSELING

## 2021-07-20 PROCEDURE — ? SEPARATE AND IDENTIFIABLE DOCUMENTATION

## 2021-07-20 PROCEDURE — ? SUNSCREEN RECOMMENDATIONS

## 2021-07-20 PROCEDURE — ? ADDITIONAL NOTES

## 2021-07-20 ASSESSMENT — LOCATION DETAILED DESCRIPTION DERM
LOCATION DETAILED: LEFT INFERIOR UPPER BACK
LOCATION DETAILED: LEFT PROXIMAL PRETIBIAL REGION
LOCATION DETAILED: PERIUMBILICAL SKIN
LOCATION DETAILED: RIGHT ANTERIOR DISTAL THIGH
LOCATION DETAILED: LEFT MID-UPPER BACK
LOCATION DETAILED: RIGHT MEDIAL SUPERIOR CHEST
LOCATION DETAILED: LEFT ANTERIOR PROXIMAL THIGH
LOCATION DETAILED: LEFT LATERAL SUPERIOR CHEST
LOCATION DETAILED: LEFT CENTRAL MALAR CHEEK
LOCATION DETAILED: RIGHT POSTERIOR SHOULDER
LOCATION DETAILED: RIGHT PROXIMAL DORSAL FOREARM
LOCATION DETAILED: LEFT PROXIMAL DORSAL FOREARM
LOCATION DETAILED: LEFT POSTERIOR SHOULDER
LOCATION DETAILED: LEFT LATERAL BREAST 4-5:00 REGION

## 2021-07-20 ASSESSMENT — LOCATION SIMPLE DESCRIPTION DERM
LOCATION SIMPLE: CHEST
LOCATION SIMPLE: LEFT FOREARM
LOCATION SIMPLE: LEFT BREAST
LOCATION SIMPLE: ABDOMEN
LOCATION SIMPLE: LEFT UPPER BACK
LOCATION SIMPLE: LEFT CHEEK
LOCATION SIMPLE: LEFT THIGH
LOCATION SIMPLE: LEFT PRETIBIAL REGION
LOCATION SIMPLE: RIGHT THIGH
LOCATION SIMPLE: RIGHT FOREARM
LOCATION SIMPLE: LEFT SHOULDER
LOCATION SIMPLE: RIGHT SHOULDER

## 2021-07-20 ASSESSMENT — LOCATION ZONE DERM
LOCATION ZONE: FACE
LOCATION ZONE: LEG
LOCATION ZONE: TRUNK
LOCATION ZONE: ARM

## 2021-07-20 NOTE — PROCEDURE: ADDITIONAL NOTES
Render Risk Assessment In Note?: yes
Additional Notes: Pt wants to wait to remove skin tags
Detail Level: Zone
Additional Notes: Discussed freezing SK patient wants to wait till the fall

## 2021-07-20 NOTE — PROCEDURE: BIOPSY BY SHAVE METHOD
Detail Level: Detailed
Depth Of Biopsy: dermis
Was A Bandage Applied: Yes
Size Of Lesion In Cm: 0
Biopsy Type: H and E
Biopsy Method: Dermablade
Anesthesia Type: 1% lidocaine with 1:100,000 epinephrine and a 1:12 solution of 8.4% sodium bicarbonate
Anesthesia Volume In Cc: 0.3
Hemostasis: Aluminum Chloride and Electrocautery
Wound Care: Petrolatum
Dressing: pressure dressing
Destruction After The Procedure: No
Type Of Destruction Used: Curettage
Curettage Text: The wound bed was treated with curettage after the biopsy was performed.
Electrodesiccation And Curettage Text: The wound bed was treated with electrodesiccation and curettage after the biopsy was
Lab: [id number]
Lab Facility: 
Consent: Written consent was obtained and risks were reviewed including but not limited to scarring, infection, bleeding, scabbing, incomplete removal, nerve damage and allergy to anesthesia.
Post-Care Instructions: I reviewed with the patient in detail post-care instructions. Patient is to keep the biopsy site dry overnight, and then apply white petrolatum twice daily until healed. Patient may apply hydrogen peroxide soaks to remove any crusting.
Notification Instructions: Patient will be notified of biopsy results. However, patient instructed to call the office if not contacted within 2 weeks.
Billing Type: Third-Party Bill
Information: Selecting Yes will display possible errors in your note based on the variables you have selected. This validation is only offered as a suggestion for you. PLEASE NOTE THAT THE VALIDATION TEXT WILL BE REMOVED WHEN YOU FINALIZE YOUR NOTE. IF YOU WANT TO FAX A PRELIMINARY NOTE YOU WILL NEED TO TOGGLE THIS TO 'NO' IF YOU DO NOT WANT IT IN YOUR FAXED NOTE.

## 2021-07-20 NOTE — PROCEDURE: SURGICAL DECISION MAKING
Risk Assessment Explanation (Does Not Render In The Note): Clinical determination of the probability and/or consequences of an event, such as surgery. Clinical assessment of the level of risk is affected by the nature of the event under consideration for the patient. Modifier 57 is used to indicate an Evaluation and Management (E/M) service resulted in the initial decision to perform surgery either the day before a major surgery (90 day global) or the day of a major surgery.
Discussion: We discussed not only the risks of the procedure but also the likely monteiro that further treatment will be required to treat lesion. This could involve another visit here to destroy or excise  lesion, a visit to a Mohs or general or plastic surgeon, scarring, limited  activity, cosmetic imperfections.
Initial Decision For Surgery?: Yes
Complexity (Necessary For Coding; Major - 90 Day Global With Some Exceptions; Minor - 10 Day Global): minor
Date Of Surgery - Today Or Tomorrow?: today

## 2021-11-28 ENCOUNTER — OFFICE VISIT (OUTPATIENT)
Dept: URGENT CARE | Facility: CLINIC | Age: 71
End: 2021-11-28
Payer: MEDICARE

## 2021-11-28 VITALS
HEART RATE: 96 BPM | HEIGHT: 66 IN | BODY MASS INDEX: 23.46 KG/M2 | OXYGEN SATURATION: 96 % | SYSTOLIC BLOOD PRESSURE: 138 MMHG | WEIGHT: 146 LBS | DIASTOLIC BLOOD PRESSURE: 80 MMHG | TEMPERATURE: 99.6 F | RESPIRATION RATE: 16 BRPM

## 2021-11-28 DIAGNOSIS — K52.9 GASTROENTERITIS: ICD-10-CM

## 2021-11-28 PROCEDURE — 99213 OFFICE O/P EST LOW 20 MIN: CPT | Performed by: NURSE PRACTITIONER

## 2021-11-28 RX ORDER — PANTOPRAZOLE SODIUM 40 MG/1
40 TABLET, DELAYED RELEASE ORAL DAILY
COMMUNITY
Start: 2021-10-31 | End: 2023-12-14

## 2021-11-28 ASSESSMENT — ENCOUNTER SYMPTOMS
CHILLS: 0
FEVER: 0
DIARRHEA: 1

## 2021-11-28 ASSESSMENT — FIBROSIS 4 INDEX: FIB4 SCORE: 0.95

## 2021-11-28 NOTE — PROGRESS NOTES
Subjective     Isabel Peralta is a 71 y.o. female who presents with Diarrhea (x5 days)    Past Medical History:   Diagnosis Date   • Anemia     history of   • Anesthesia     post op nausea   • Cancer (HCC)     basal cell skin cancer   • Carotid atherosclerosis, bilateral    • Celiac disease    • Heart burn    • High cholesterol    • Pain     right shoulder   • Splenic artery aneurysm (HCC)      Social History     Socioeconomic History   • Marital status:      Spouse name: Not on file   • Number of children: Not on file   • Years of education: Not on file   • Highest education level: Not on file   Occupational History   • Not on file   Tobacco Use   • Smoking status: Former Smoker     Packs/day: 0.50     Years: 4.00     Pack years: 2.00     Quit date: 1971     Years since quittin.9   • Smokeless tobacco: Never Used   • Tobacco comment: quit 49 years ago   Vaping Use   • Vaping Use: Never used   Substance and Sexual Activity   • Alcohol use: Not Currently     Comment: on the holidays   • Drug use: No   • Sexual activity: Not on file   Other Topics Concern   • Not on file   Social History Narrative   • Not on file     Social Determinants of Health     Financial Resource Strain:    • Difficulty of Paying Living Expenses: Not on file   Food Insecurity:    • Worried About Running Out of Food in the Last Year: Not on file   • Ran Out of Food in the Last Year: Not on file   Transportation Needs:    • Lack of Transportation (Medical): Not on file   • Lack of Transportation (Non-Medical): Not on file   Physical Activity:    • Days of Exercise per Week: Not on file   • Minutes of Exercise per Session: Not on file   Stress:    • Feeling of Stress : Not on file   Social Connections:    • Frequency of Communication with Friends and Family: Not on file   • Frequency of Social Gatherings with Friends and Family: Not on file   • Attends Church Services: Not on file   • Active Member of Clubs or  "Organizations: Not on file   • Attends Club or Organization Meetings: Not on file   • Marital Status: Not on file   Intimate Partner Violence:    • Fear of Current or Ex-Partner: Not on file   • Emotionally Abused: Not on file   • Physically Abused: Not on file   • Sexually Abused: Not on file   Housing Stability:    • Unable to Pay for Housing in the Last Year: Not on file   • Number of Places Lived in the Last Year: Not on file   • Unstable Housing in the Last Year: Not on file     Family History   Problem Relation Age of Onset   • Heart Disease Mother         pci in 60s, cabg in 70s   • Hypertension Mother    • Hyperlipidemia Mother        Allergies: Gluten meal    Patient is a 71-year-old female who presents today with complaint of diarrhea. Estimates 3-5 episodes daily over the last 4 days. States no acute abdominal pain or cramping. No fever. Has been able to tolerate fluids.        Diarrhea   This is a new problem. The current episode started in the past 7 days. The problem occurs 2 to 4 times per day. The problem has been unchanged. Pertinent negatives include no chills or fever. Nothing aggravates the symptoms. She has tried nothing for the symptoms. The treatment provided no relief.       Review of Systems   Constitutional: Positive for malaise/fatigue. Negative for chills and fever.   Gastrointestinal: Positive for diarrhea.   All other systems reviewed and are negative.             Objective     /80   Pulse 96   Temp 37.6 °C (99.6 °F) (Temporal)   Resp 16   Ht 1.676 m (5' 6\")   Wt 66.2 kg (146 lb)   SpO2 96%   Breastfeeding No   BMI 23.57 kg/m²      Physical Exam  Vitals reviewed.   Constitutional:       Appearance: Normal appearance.   HENT:      Mouth/Throat:      Mouth: Mucous membranes are moist.   Eyes:      Extraocular Movements: Extraocular movements intact.      Conjunctiva/sclera: Conjunctivae normal.      Pupils: Pupils are equal, round, and reactive to light.   Cardiovascular:    "   Rate and Rhythm: Normal rate and regular rhythm.      Heart sounds: Normal heart sounds.   Pulmonary:      Effort: Pulmonary effort is normal.      Breath sounds: Normal breath sounds.   Abdominal:      Tenderness: There is no abdominal tenderness. There is no right CVA tenderness, left CVA tenderness or guarding.   Musculoskeletal:      Cervical back: Normal range of motion and neck supple.   Skin:     General: Skin is warm and dry.   Neurological:      General: No focal deficit present.      Mental Status: She is alert.   Psychiatric:         Mood and Affect: Mood normal.         Behavior: Behavior normal.                             Assessment & Plan   Gastroenteritis    Brat diet  Push electrolyte fluids  Imodium as needed  Follow-up in 3 to 5 days for persistent symptoms  Strict ER precautions for developing abdominal pain, fever greater than 100.5, or intractable vomiting or diarrhea     There are no diagnoses linked to this encounter.

## 2021-12-02 ENCOUNTER — TELEPHONE (OUTPATIENT)
Dept: URGENT CARE | Facility: CLINIC | Age: 71
End: 2021-12-02

## 2021-12-02 ENCOUNTER — HOSPITAL ENCOUNTER (EMERGENCY)
Facility: MEDICAL CENTER | Age: 71
End: 2021-12-02
Attending: EMERGENCY MEDICINE
Payer: MEDICARE

## 2021-12-02 VITALS
RESPIRATION RATE: 18 BRPM | HEART RATE: 94 BPM | BODY MASS INDEX: 23.46 KG/M2 | HEIGHT: 66 IN | WEIGHT: 145.94 LBS | OXYGEN SATURATION: 98 % | SYSTOLIC BLOOD PRESSURE: 147 MMHG | TEMPERATURE: 97.8 F | DIASTOLIC BLOOD PRESSURE: 91 MMHG

## 2021-12-02 DIAGNOSIS — E87.6 HYPOKALEMIA: ICD-10-CM

## 2021-12-02 DIAGNOSIS — R19.7 DIARRHEA OF PRESUMED INFECTIOUS ORIGIN: ICD-10-CM

## 2021-12-02 LAB
ALBUMIN SERPL BCP-MCNC: 4.1 G/DL (ref 3.2–4.9)
ALBUMIN/GLOB SERPL: 1.2 G/DL
ALP SERPL-CCNC: 104 U/L (ref 30–99)
ALT SERPL-CCNC: 15 U/L (ref 2–50)
ANION GAP SERPL CALC-SCNC: 18 MMOL/L (ref 7–16)
APPEARANCE UR: CLEAR
AST SERPL-CCNC: 14 U/L (ref 12–45)
BACTERIA #/AREA URNS HPF: ABNORMAL /HPF
BASOPHILS # BLD AUTO: 0 % (ref 0–1.8)
BASOPHILS # BLD: 0 K/UL (ref 0–0.12)
BILIRUB SERPL-MCNC: 0.9 MG/DL (ref 0.1–1.5)
BILIRUB UR QL STRIP.AUTO: NEGATIVE
BUN SERPL-MCNC: 21 MG/DL (ref 8–22)
C DIFF DNA SPEC QL NAA+PROBE: NEGATIVE
C DIFF TOX GENS STL QL NAA+PROBE: NEGATIVE
CALCIUM SERPL-MCNC: 9.6 MG/DL (ref 8.4–10.2)
CHLORIDE SERPL-SCNC: 98 MMOL/L (ref 96–112)
CO2 SERPL-SCNC: 21 MMOL/L (ref 20–33)
COLOR UR: YELLOW
CREAT SERPL-MCNC: 1.06 MG/DL (ref 0.5–1.4)
EKG IMPRESSION: NORMAL
EKG IMPRESSION: NORMAL
EOSINOPHIL # BLD AUTO: 0 K/UL (ref 0–0.51)
EOSINOPHIL NFR BLD: 0 % (ref 0–6.9)
EPI CELLS #/AREA URNS HPF: ABNORMAL /HPF
ERYTHROCYTE [DISTWIDTH] IN BLOOD BY AUTOMATED COUNT: 39 FL (ref 35.9–50)
GLOBULIN SER CALC-MCNC: 3.5 G/DL (ref 1.9–3.5)
GLUCOSE SERPL-MCNC: 94 MG/DL (ref 65–99)
GLUCOSE UR STRIP.AUTO-MCNC: NEGATIVE MG/DL
HCT VFR BLD AUTO: 44.7 % (ref 37–47)
HGB BLD-MCNC: 15.6 G/DL (ref 12–16)
HYALINE CASTS #/AREA URNS LPF: ABNORMAL /LPF
KETONES UR STRIP.AUTO-MCNC: 40 MG/DL
LEUKOCYTE ESTERASE UR QL STRIP.AUTO: NEGATIVE
LG PLATELETS BLD QL SMEAR: NORMAL
LYMPHOCYTES # BLD AUTO: 2.21 K/UL (ref 1–4.8)
LYMPHOCYTES NFR BLD: 28 % (ref 22–41)
MAGNESIUM SERPL-MCNC: 2 MG/DL (ref 1.5–2.5)
MANUAL DIFF BLD: NORMAL
MCH RBC QN AUTO: 30.8 PG (ref 27–33)
MCHC RBC AUTO-ENTMCNC: 34.9 G/DL (ref 33.6–35)
MCV RBC AUTO: 88.3 FL (ref 81.4–97.8)
MICRO URNS: ABNORMAL
MONOCYTES # BLD AUTO: 0.55 K/UL (ref 0–0.85)
MONOCYTES NFR BLD AUTO: 7 % (ref 0–13.4)
MUCOUS THREADS #/AREA URNS HPF: ABNORMAL /HPF
MYELOCYTES NFR BLD MANUAL: 1 %
NEUTROPHILS # BLD AUTO: 5.06 K/UL (ref 2–7.15)
NEUTROPHILS NFR BLD: 58 % (ref 44–72)
NEUTS BAND NFR BLD MANUAL: 6 % (ref 0–10)
NITRITE UR QL STRIP.AUTO: NEGATIVE
NRBC # BLD AUTO: 0 K/UL
NRBC BLD-RTO: 0 /100 WBC
PH UR STRIP.AUTO: 6 [PH] (ref 5–8)
PLATELET # BLD AUTO: 381 K/UL (ref 164–446)
PLATELET BLD QL SMEAR: NORMAL
PMV BLD AUTO: 9.5 FL (ref 9–12.9)
POTASSIUM SERPL-SCNC: 2.7 MMOL/L (ref 3.6–5.5)
PROT SERPL-MCNC: 7.6 G/DL (ref 6–8.2)
PROT UR QL STRIP: NEGATIVE MG/DL
RBC # BLD AUTO: 5.06 M/UL (ref 4.2–5.4)
RBC # URNS HPF: ABNORMAL /HPF
RBC BLD AUTO: NORMAL
RBC UR QL AUTO: ABNORMAL
SODIUM SERPL-SCNC: 137 MMOL/L (ref 135–145)
SP GR UR STRIP.AUTO: 1.02
VARIANT LYMPHS BLD QL SMEAR: NORMAL
WBC # BLD AUTO: 7.9 K/UL (ref 4.8–10.8)
WBC #/AREA URNS HPF: ABNORMAL /HPF

## 2021-12-02 PROCEDURE — 85027 COMPLETE CBC AUTOMATED: CPT

## 2021-12-02 PROCEDURE — A9270 NON-COVERED ITEM OR SERVICE: HCPCS | Performed by: EMERGENCY MEDICINE

## 2021-12-02 PROCEDURE — 87493 C DIFF AMPLIFIED PROBE: CPT

## 2021-12-02 PROCEDURE — 87899 AGENT NOS ASSAY W/OPTIC: CPT | Mod: 91

## 2021-12-02 PROCEDURE — 80053 COMPREHEN METABOLIC PANEL: CPT

## 2021-12-02 PROCEDURE — 96368 THER/DIAG CONCURRENT INF: CPT

## 2021-12-02 PROCEDURE — 700102 HCHG RX REV CODE 250 W/ 637 OVERRIDE(OP): Performed by: EMERGENCY MEDICINE

## 2021-12-02 PROCEDURE — 85007 BL SMEAR W/DIFF WBC COUNT: CPT

## 2021-12-02 PROCEDURE — 83735 ASSAY OF MAGNESIUM: CPT

## 2021-12-02 PROCEDURE — 96365 THER/PROPH/DIAG IV INF INIT: CPT

## 2021-12-02 PROCEDURE — 36415 COLL VENOUS BLD VENIPUNCTURE: CPT

## 2021-12-02 PROCEDURE — 81001 URINALYSIS AUTO W/SCOPE: CPT

## 2021-12-02 PROCEDURE — 99285 EMERGENCY DEPT VISIT HI MDM: CPT

## 2021-12-02 PROCEDURE — 96366 THER/PROPH/DIAG IV INF ADDON: CPT

## 2021-12-02 PROCEDURE — 87045 FECES CULTURE AEROBIC BACT: CPT

## 2021-12-02 PROCEDURE — 93005 ELECTROCARDIOGRAM TRACING: CPT

## 2021-12-02 PROCEDURE — 93005 ELECTROCARDIOGRAM TRACING: CPT | Performed by: EMERGENCY MEDICINE

## 2021-12-02 PROCEDURE — 700111 HCHG RX REV CODE 636 W/ 250 OVERRIDE (IP): Performed by: EMERGENCY MEDICINE

## 2021-12-02 RX ORDER — LOPERAMIDE HYDROCHLORIDE 2 MG/1
TABLET ORAL
Qty: 20 TABLET | Refills: 0 | Status: SHIPPED | OUTPATIENT
Start: 2021-12-02 | End: 2022-04-04

## 2021-12-02 RX ORDER — POTASSIUM CHLORIDE 750 MG/1
10 TABLET, FILM COATED, EXTENDED RELEASE ORAL 2 TIMES DAILY
Qty: 20 TABLET | Refills: 3 | Status: SHIPPED | OUTPATIENT
Start: 2021-12-02 | End: 2022-04-04

## 2021-12-02 RX ORDER — POTASSIUM CHLORIDE 20 MEQ/1
40 TABLET, EXTENDED RELEASE ORAL ONCE
Status: COMPLETED | OUTPATIENT
Start: 2021-12-02 | End: 2021-12-02

## 2021-12-02 RX ORDER — MAGNESIUM SULFATE HEPTAHYDRATE 40 MG/ML
2 INJECTION, SOLUTION INTRAVENOUS ONCE
Status: COMPLETED | OUTPATIENT
Start: 2021-12-02 | End: 2021-12-02

## 2021-12-02 RX ORDER — POTASSIUM CHLORIDE 7.45 MG/ML
10 INJECTION INTRAVENOUS ONCE
Status: COMPLETED | OUTPATIENT
Start: 2021-12-02 | End: 2021-12-02

## 2021-12-02 RX ADMIN — MAGNESIUM SULFATE 2 G: 2 INJECTION INTRAVENOUS at 14:06

## 2021-12-02 RX ADMIN — POTASSIUM CHLORIDE 40 MEQ: 1500 TABLET, EXTENDED RELEASE ORAL at 14:05

## 2021-12-02 RX ADMIN — POTASSIUM CHLORIDE 10 MEQ: 7.46 INJECTION, SOLUTION INTRAVENOUS at 14:06

## 2021-12-02 ASSESSMENT — FIBROSIS 4 INDEX: FIB4 SCORE: 0.95

## 2021-12-02 NOTE — TELEPHONE ENCOUNTER
Returned patient's call.  She is very concerned because she is still having diarrhea.  Patient states she is having also some ongoing abdominal discomfort with this.  After speaking with patient we did discuss ordering labs and follow-up in urgent care with me tomorrow, however she states that she is very concerned and would rather go to the emergency room at this point.  Patient states she will go to ER at [facility].  No further questions at this time.

## 2021-12-02 NOTE — ED TRIAGE NOTES
"Chief Complaint   Patient presents with   • Diarrhea     x9 days, hx of celiac disease      /88   Pulse (!) 102   Temp 36.6 °C (97.8 °F) (Temporal)   Resp 18   Ht 1.676 m (5' 6\")   Wt 66.2 kg (145 lb 15.1 oz)   SpO2 98%   BMI 23.56 kg/m²     Pt was seen at  on Sunday and was told she probably had some sort of stomach virus, pt c/o discomfort and \"I just cant take it anymore\"    Has this patient been vaccinated for COVID - YES  If not, would they like to be vaccinated while in the ER if eligible?  n/a  Would the patient like to speak with the ERP about the possibility of receiving the COVID vaccine today before making a decision? n/a    "

## 2021-12-02 NOTE — ED PROVIDER NOTES
ED Provider Note    CHIEF COMPLAINT  Chief Complaint   Patient presents with   • Diarrhea     x9 days, hx of celiac disease        HPI  Isabel Peralta is a 71 y.o. female who presents for diarrhea for the last 9 days.  This began on .  Initially she had 5-10 episodes a day although it is improved slightly this week.  She has felt malaise but not really abdominal pain.  There is been no nausea or vomiting.  No fever.  No bloody or mucoid stool.  No recent antibiotics.  History of celiac disease but she is never had diarrhea like this from that and she is been careful to avoid gluten.  No Crohn's disease or ulcerative colitis.  No travel food poisoning camping or ill contacts.  She has been to urgent care once.  No Diabetes nor immune compromise.    REVIEW OF SYSTEMS  Pertinent positives include: Copious diarrhea, malaise, 5 pound weight loss, fatigue.  Pertinent negatives include: Fever, abdominal pain, dysuria, urgency, frequency, rash, swelling.  10+ systems reviewed and negative.      PAST MEDICAL HISTORY  Past Medical History:   Diagnosis Date   • Anemia     history of   • Anesthesia     post op nausea   • Cancer (HCC)     basal cell skin cancer   • Carotid atherosclerosis, bilateral    • Celiac disease    • Heart burn    • High cholesterol    • Pain     right shoulder   • Splenic artery aneurysm (HCC)        FAMILY HISTORY  Family History   Problem Relation Age of Onset   • Heart Disease Mother         pci in 60s, cabg in 70s   • Hypertension Mother    • Hyperlipidemia Mother        SOCIAL HISTORY  Social History     Tobacco Use   • Smoking status: Former Smoker     Packs/day: 0.50     Years: 4.00     Pack years: 2.00     Quit date: 1971     Years since quittin.9   • Smokeless tobacco: Never Used   • Tobacco comment: quit 49 years ago   Vaping Use   • Vaping Use: Never used   Substance Use Topics   • Alcohol use: Not Currently     Comment: on the holidays   • Drug use: No     Social  History     Substance and Sexual Activity   Drug Use No       SURGICAL HISTORY  Past Surgical History:   Procedure Laterality Date   • COLONOSCOPY - ENDO N/A 2015    Procedure: COLONOSCOPY - ENDO;  Surgeon: Jef Saavedra M.D.;  Location: [facility];  Service:    • SHOULDER DECOMPRESSION ARTHROSCOPIC Right 2015    Procedure: SHOULDER DECOMPRESSION ARTHROSCOPIC;  Surgeon: Rohan Campos M.D.;  Location: [facility];  Service:    • HARDWARE REMOVAL ORTHO Right 2015    Procedure: HARDWARE REMOVAL ORTHO;  Surgeon: Rohan Campos M.D.;  Location: [facility];  Service:    • SHOULDER ARTHROSCOPY W/ ROTATOR CUFF REPAIR Right 2015    Procedure: SHOULDER ARTHROSCOPY W/ ROTATOR CUFF REPAIR;  Surgeon: Rohan Campos M.D.;  Location: [facility];  Service:    • SHOULDER ARTHROSCOPY W/ ROTATOR CUFF REPAIR  2007    right   • HYSTERECTOMY, TOTAL ABDOMINAL     • OTHER ABDOMINAL SURGERY      perforated small intestine   • PRIMARY C SECTION  ,,           CURRENT MEDICATIONS  No current facility-administered medications for this encounter.     Current Outpatient Medications   Medication Sig Dispense Refill   • pantoprazole (PROTONIX) 40 MG Tablet Delayed Response      • ezetimibe (ZETIA) 10 MG Tab Take 1 tablet by mouth once daily 90 Tablet 1   • rosuvastatin (CRESTOR) 5 MG Tab TAKE 1 TABLET BY MOUTH ONCE DAILY IN THE EVENING 100 tablet 1   • Ascorbic Acid (VITAMIN C) 1000 MG Tab Take 2 Tabs by mouth every day.     • Calcium Carbonate-Vitamin D (CALCIUM-VITAMIN D3 PO) Take  by mouth.     • ibuprofen (MOTRIN) 200 MG TABS Take 200 mg by mouth every 6 hours as needed.     • estradiol (VAGIFEM) 10 MCG TABS Insert 10 mcg in vagina. Twice a week     • aspirin 81 MG tablet Take 81 mg by mouth every day.     • triamcinolone (NASACORT) 55 MCG/ACT nasal inhaler Spray 1 Spray in nose every day.         ALLERGIES  Allergies   Allergen Reactions   •  "Gluten Meal      Belching, burping, bloating, \"stomach churning\"       PHYSICAL EXAM  VITAL SIGNS: /88   Pulse (!) 102   Temp 36.6 °C (97.8 °F) (Temporal)   Resp 18   Ht 1.676 m (5' 6\")   Wt 66.2 kg (145 lb 15.1 oz)   SpO2 98%   BMI 23.56 kg/m²   Reviewed and tachycardic, afebrile  Constitutional: Well developed, Well nourished, well-appearing.  HENT: Normocephalic, atraumatic, bilateral external ears normal, Wearing mask.   Eyes: PERRLA, conjunctiva pink, no scleral icterus.   Cardiovascular: Tachycardic regular S1-S2 without murmur, rub, gallop.  No dependent edema or calf tenderness.  Respiratory: No rales, rhonchi, wheeze or cough.  Gastrointestinal: Soft, nontender, nondistended, no organomegaly.  No tympany.  Skin: No erythema, no rash.   Genitourinary:  No costovertebral angle tenderness.   Neurologic: Alert & oriented x 3, cranial nerves 2-12 intact by passive exam.  No focal deficit noted.  Psychiatric: Affect normal, Judgment normal, Mood normal.     DIFFERENTIAL DIAGNOSIS:  Viral gastroenteritis, colitis, doubt diverticulitis, malabsorptive diarrhea, celiac disease, C. difficile, doubt parasitic disease.    EKG Interpretation 3:59 PM    Interpreted by me.  Indication: Po kalemia    Rhythm: normal sinus   Rate: Tachycardic at 100  Axis: normal  Ectopy: none  Conduction: normal  ST/T Waves: no acute change  Q Waves: Inferior  R Wave progression: Poor septal    Clinical Impression: Sinus tachycardia with possible age-indeterminate ischemia      LABORATORY:  Results for orders placed or performed during the hospital encounter of 12/02/21   CBC WITH DIFFERENTIAL   Result Value Ref Range    WBC 7.9 4.8 - 10.8 K/uL    RBC 5.06 4.20 - 5.40 M/uL    Hemoglobin 15.6 12.0 - 16.0 g/dL    Hematocrit 44.7 37.0 - 47.0 %    MCV 88.3 81.4 - 97.8 fL    MCH 30.8 27.0 - 33.0 pg    MCHC 34.9 33.6 - 35.0 g/dL    RDW 39.0 35.9 - 50.0 fL    Platelet Count 381 164 - 446 K/uL    MPV 9.5 9.0 - 12.9 fL    " Neutrophils-Polys 58.00 44.00 - 72.00 %    Lymphocytes 28.00 22.00 - 41.00 %    Monocytes 7.00 0.00 - 13.40 %    Eosinophils 0.00 0.00 - 6.90 %    Basophils 0.00 0.00 - 1.80 %    Nucleated RBC 0.00 /100 WBC    Neutrophils (Absolute) 5.06 2.00 - 7.15 K/uL    Lymphs (Absolute) 2.21 1.00 - 4.80 K/uL    Monos (Absolute) 0.55 0.00 - 0.85 K/uL    Eos (Absolute) 0.00 0.00 - 0.51 K/uL    Baso (Absolute) 0.00 0.00 - 0.12 K/uL    NRBC (Absolute) 0.00 K/uL   Comp Metabolic Panel   Result Value Ref Range    Sodium 137 135 - 145 mmol/L    Potassium 2.7 (LL) 3.6 - 5.5 mmol/L    Chloride 98 96 - 112 mmol/L    Co2 21 20 - 33 mmol/L    Anion Gap 18.0 (H) 7.0 - 16.0    Glucose 94 65 - 99 mg/dL    Bun 21 8 - 22 mg/dL    Creatinine 1.06 0.50 - 1.40 mg/dL    Calcium 9.6 8.4 - 10.2 mg/dL    AST(SGOT) 14 12 - 45 U/L    ALT(SGPT) 15 2 - 50 U/L    Alkaline Phosphatase 104 (H) 30 - 99 U/L    Total Bilirubin 0.9 0.1 - 1.5 mg/dL    Albumin 4.1 3.2 - 4.9 g/dL    Total Protein 7.6 6.0 - 8.2 g/dL    Globulin 3.5 1.9 - 3.5 g/dL    A-G Ratio 1.2 g/dL   MAGNESIUM   Result Value Ref Range    Magnesium 2.0 1.5 - 2.5 mg/dL   URINALYSIS   Result Value Ref Range    Color Yellow     Character Clear     Specific Gravity 1.025 <1.035    Ph 6.0 5.0 - 8.0    Glucose Negative Negative mg/dL    Ketones 40 (A) Negative mg/dL    Protein Negative Negative mg/dL    Bilirubin Negative Negative    Nitrite Negative Negative    Leukocyte Esterase Negative Negative    Occult Blood Trace (A) Negative    Micro Urine Req Microscopic    URINE MICROSCOPIC (W/UA)   Result Value Ref Range    WBC 2-5 /hpf    RBC Rare /hpf    Bacteria Few (A) None /hpf    Epithelial Cells Few Few /hpf    Mucous Threads Few /hpf    Hyaline Cast 3-5 (A) /lpf       INTERVENTIONS:  Medications   magnesium sulfate IVPB premix 2 g (0 g Intravenous Stopped 12/2/21 1606)   potassium chloride (KCL) ivpb 10 mEq (0 mEq Intravenous Stopped 12/2/21 1506)   potassium chloride SA (Kdur) tablet 40 mEq (40 mEq  Oral Given 12/2/21 1713)       COURSE & MEDICAL DECISION MAKING  This patient presents with 9 days of copious diarrhea and has quite severe hypokalemia.  With out abdominal pain and with no leukocytosis or fever colitis or diverticulitis are very unlikely.  This could be a viral syndrome or malabsorptive diarrhea from celiac disease.  He has quite severe hypokalemia but no EKG abnormalities.  She does have fatigue likely due to dehydration and hypokalemia.    PLAN:  Discharge Medication List as of 12/2/2021  3:10 PM      START taking these medications    Details   potassium chloride ER (KLOR-CON) 10 MEQ tablet Take 1 Tablet by mouth 2 times a day., Disp-20 Tablet, R-3, Normal      loperamide (IMODIUM A-D) 2 MG tablet Take 2 tablets for diarrhea and one tablet after each loose stool to max 8 tablets daily, Disp-20 Tablet, R-0, Normal             Stool culture parasites and C. difficile pending  Diarrhea and hypokalemia handout given  Return for any diarrhea, abdominal pain, ill appearance    Cari Bean M.D.  [address] [address]  [phone]    Schedule an appointment as soon as possible for a visit   As needed      CONDITION: Stable.    FINAL IMPRESSION  1. Diarrhea of presumed infectious origin    2. Hypokalemia          Electronically signed by: Alfredo Medina M.D., 12/2/2021 11:38 AM

## 2021-12-02 NOTE — DISCHARGE INSTRUCTIONS
Diarrhea, Home Care Instructions for    Take antidiarrheal medication as needed.    Drink fluids.  Eat fiber and avoid fruit and milk products.  Return for renewed fever, constant worsening pain, bloody diarrhea or uncontrolled vomiting.     See a doctor if not improving in 4-5 days.  Take potassium to replace your potassium.  If diarrhea continues submit a stool sample to the lab.  2 days later call Dr. Bean for the results.    You had a borderline or high normal blood pressure reading today.  This does not necessarily mean you have hypertension.  Please followup with your/a primary physician for comprehensive blood pressure evaluation and yearly fasting cholesterol assessment.  BP Readings from Last 3 Encounters:   12/02/21 139/88   11/28/21 138/80   06/22/21 115/74           Bacterial (germ) infections or a virus commonly cause diarrhea. Your caregiver has determined that with time, rest, fluids and/or possibly anti-diarrheal medication, you should see improvement in your condition. Until your diarrhea is under control, you should drink clear liquids frequently, in small amounts. Clear liquids include: water, broth, jello water, apple juice, weak tea, and 7-Up®.     Avoid:  Milk  Fruits  Extremely hot or cold fluids Alcohol  Tobacco  Too much intake of anything at one time     When your diarrhea stops you may add the following foods which help the stool to become more formed:    Rice Bananas Dry toast Apples without the skin     Once these foods are tolerated you may add low-fat yogurt and low-fat cottage cheese. They will restore the normal bacterial balance in your bowel.  Wash your hands well to avoid spreading any bacteria or virus.    seek immediate medical attention if:  You are unable to keep fluids down.  Vomiting or diarrhea becomes persistent.  Abdominal (belly) pain develops or increases or localizes. (Right sided pain can be appendicitis and left sided pain in adults can be diverticulitis).  You  develop a high fever, which is an oral temperature above 102° F (38.9° C), or as your caregiver suggests.  Diarrhea becomes excessive or contains blood or mucous.  Excessive weakness, dizziness, fainting or extreme thirst.  Checking weight 2 to 3 times per day in babies and children will help verify adequate fluid replacement. Your caregiver will tell you what loss should concern you or suggest another visit to your personal physician.  Record your weight or your child's weight today. Compare this to your home scale and record all weights and time and date weighed. Try to check weight at the same times every day. Bring this chart to your caregivers if you or your child needs to be seen again.    Document Released: 12/18/2006  Document Re-Released: 06/11/2007  [employer]® Patient Information ©2008 [employer], [employer].

## 2021-12-03 LAB
E COLI SXT1+2 STL IA: NORMAL
SIGNIFICANT IND 70042: NORMAL
SITE SITE: NORMAL
SOURCE SOURCE: NORMAL

## 2021-12-04 LAB
BACTERIA STL CULT: NORMAL
C JEJUNI+C COLI AG STL QL: NORMAL
E COLI SXT1+2 STL IA: NORMAL
SIGNIFICANT IND 70042: NORMAL
SITE SITE: NORMAL
SOURCE SOURCE: NORMAL

## 2021-12-09 ENCOUNTER — HOSPITAL ENCOUNTER (OUTPATIENT)
Dept: LAB | Facility: MEDICAL CENTER | Age: 71
End: 2021-12-09
Attending: FAMILY MEDICINE
Payer: MEDICARE

## 2021-12-09 LAB
ALBUMIN SERPL BCP-MCNC: 3.6 G/DL (ref 3.2–4.9)
ALBUMIN/GLOB SERPL: 1.1 G/DL
ALP SERPL-CCNC: 80 U/L (ref 30–99)
ALT SERPL-CCNC: 16 U/L (ref 2–50)
ANION GAP SERPL CALC-SCNC: 9 MMOL/L (ref 7–16)
AST SERPL-CCNC: 14 U/L (ref 12–45)
BILIRUB SERPL-MCNC: 0.3 MG/DL (ref 0.1–1.5)
BUN SERPL-MCNC: 14 MG/DL (ref 8–22)
CALCIUM SERPL-MCNC: 9.4 MG/DL (ref 8.5–10.5)
CHLORIDE SERPL-SCNC: 105 MMOL/L (ref 96–112)
CO2 SERPL-SCNC: 25 MMOL/L (ref 20–33)
CREAT SERPL-MCNC: 0.72 MG/DL (ref 0.5–1.4)
GLOBULIN SER CALC-MCNC: 3.2 G/DL (ref 1.9–3.5)
GLUCOSE SERPL-MCNC: 99 MG/DL (ref 65–99)
POTASSIUM SERPL-SCNC: 4 MMOL/L (ref 3.6–5.5)
PROT SERPL-MCNC: 6.8 G/DL (ref 6–8.2)
SODIUM SERPL-SCNC: 139 MMOL/L (ref 135–145)

## 2021-12-09 PROCEDURE — 80053 COMPREHEN METABOLIC PANEL: CPT

## 2021-12-09 PROCEDURE — 36415 COLL VENOUS BLD VENIPUNCTURE: CPT

## 2021-12-27 NOTE — HPI: FULL BODY SKIN EXAMINATION
Problem: Adult Inpatient Plan of Care  Goal: Absence of Hospital-Acquired Illness or Injury  Intervention: Identify and Manage Fall Risk  Recent Flowsheet Documentation  Taken 12/26/2021 1600 by Renny Smith RN  Safety Promotion/Fall Prevention:   nonskid shoes/slippers when out of bed   patient and family education   room door open     Problem: Adult Inpatient Plan of Care  Goal: Absence of Hospital-Acquired Illness or Injury  Intervention: Prevent Skin Injury  Recent Flowsheet Documentation  Taken 12/26/2021 1600 by Renny Smith RN  Body Position: position changed independently     Problem: Pain (Surgery Nonspecified)  Goal: Acceptable Pain Control  Intervention: Prevent or Manage Pain  Recent Flowsheet Documentation  Taken 12/26/2021 2056 by Renny Smith RN  Pain Management Interventions: medication (see MAR)  Taken 12/26/2021 1600 by Renny Smith RN  Pain Management Interventions: declines     Has about 150ml of drainage from ostomy. Had 1 watery stool.  Has TPN infusing. Red port on PICC TPA'd and now blood return noted.   Sent belongings to security, see chart.  
How Severe Are Your Spot(S)?: mild
What Is The Reason For Today's Visit?: Full Body Skin Examination
What Is The Reason For Today's Visit? (Being Monitored For X): concerning skin lesions on an annual basis

## 2022-01-18 ENCOUNTER — APPOINTMENT (RX ONLY)
Dept: URBAN - METROPOLITAN AREA CLINIC 35 | Facility: CLINIC | Age: 72
Setting detail: DERMATOLOGY
End: 2022-01-18

## 2022-01-18 DIAGNOSIS — Z87.2 PERSONAL HISTORY OF DISEASES OF THE SKIN AND SUBCUTANEOUS TISSUE: ICD-10-CM

## 2022-01-18 DIAGNOSIS — L81.4 OTHER MELANIN HYPERPIGMENTATION: ICD-10-CM

## 2022-01-18 DIAGNOSIS — Z85.828 PERSONAL HISTORY OF OTHER MALIGNANT NEOPLASM OF SKIN: ICD-10-CM

## 2022-01-18 DIAGNOSIS — L71.8 OTHER ROSACEA: ICD-10-CM | Status: INADEQUATELY CONTROLLED

## 2022-01-18 DIAGNOSIS — D18.0 HEMANGIOMA: ICD-10-CM

## 2022-01-18 DIAGNOSIS — D22 MELANOCYTIC NEVI: ICD-10-CM

## 2022-01-18 DIAGNOSIS — L82.1 OTHER SEBORRHEIC KERATOSIS: ICD-10-CM

## 2022-01-18 DIAGNOSIS — Z71.89 OTHER SPECIFIED COUNSELING: ICD-10-CM

## 2022-01-18 DIAGNOSIS — D17 BENIGN LIPOMATOUS NEOPLASM: ICD-10-CM

## 2022-01-18 DIAGNOSIS — L57.0 ACTINIC KERATOSIS: ICD-10-CM

## 2022-01-18 DIAGNOSIS — L68.0 HIRSUTISM: ICD-10-CM

## 2022-01-18 DIAGNOSIS — L72.8 OTHER FOLLICULAR CYSTS OF THE SKIN AND SUBCUTANEOUS TISSUE: ICD-10-CM | Status: STABLE

## 2022-01-18 PROBLEM — D23.5 OTHER BENIGN NEOPLASM OF SKIN OF TRUNK: Status: ACTIVE | Noted: 2022-01-18

## 2022-01-18 PROBLEM — D17.1 BENIGN LIPOMATOUS NEOPLASM OF SKIN AND SUBCUTANEOUS TISSUE OF TRUNK: Status: ACTIVE | Noted: 2022-01-18

## 2022-01-18 PROBLEM — D17.21 BENIGN LIPOMATOUS NEOPLASM OF SKIN AND SUBCUTANEOUS TISSUE OF RIGHT ARM: Status: ACTIVE | Noted: 2022-01-18

## 2022-01-18 PROBLEM — D22.61 MELANOCYTIC NEVI OF RIGHT UPPER LIMB, INCLUDING SHOULDER: Status: ACTIVE | Noted: 2022-01-18

## 2022-01-18 PROBLEM — D17.22 BENIGN LIPOMATOUS NEOPLASM OF SKIN AND SUBCUTANEOUS TISSUE OF LEFT ARM: Status: ACTIVE | Noted: 2022-01-18

## 2022-01-18 PROBLEM — D17.23 BENIGN LIPOMATOUS NEOPLASM OF SKIN AND SUBCUTANEOUS TISSUE OF RIGHT LEG: Status: ACTIVE | Noted: 2022-01-18

## 2022-01-18 PROBLEM — D17.24 BENIGN LIPOMATOUS NEOPLASM OF SKIN AND SUBCUTANEOUS TISSUE OF LEFT LEG: Status: ACTIVE | Noted: 2022-01-18

## 2022-01-18 PROBLEM — D18.01 HEMANGIOMA OF SKIN AND SUBCUTANEOUS TISSUE: Status: ACTIVE | Noted: 2022-01-18

## 2022-01-18 PROCEDURE — ? PRESCRIPTION

## 2022-01-18 PROCEDURE — ? TREATMENT REGIMEN

## 2022-01-18 PROCEDURE — ? COUNSELING

## 2022-01-18 PROCEDURE — ? SUNSCREEN RECOMMENDATIONS

## 2022-01-18 PROCEDURE — 99214 OFFICE O/P EST MOD 30 MIN: CPT | Mod: 25

## 2022-01-18 PROCEDURE — ? LIQUID NITROGEN (COSMETIC)

## 2022-01-18 PROCEDURE — ? LIQUID NITROGEN

## 2022-01-18 PROCEDURE — 17003 DESTRUCT PREMALG LES 2-14: CPT

## 2022-01-18 PROCEDURE — 17000 DESTRUCT PREMALG LESION: CPT

## 2022-01-18 RX ORDER — METRONIDAZOLE 7.5 MG/ML
1 LOTION TOPICAL TWICE A DAY
Qty: 59 | Refills: 11 | Status: ERX

## 2022-01-18 ASSESSMENT — LOCATION ZONE DERM
LOCATION ZONE: FACE
LOCATION ZONE: HAND
LOCATION ZONE: NECK
LOCATION ZONE: ARM
LOCATION ZONE: LEG
LOCATION ZONE: TRUNK

## 2022-01-18 ASSESSMENT — LOCATION DETAILED DESCRIPTION DERM
LOCATION DETAILED: LEFT MEDIAL BREAST 11-12:00 REGION
LOCATION DETAILED: PERIUMBILICAL SKIN
LOCATION DETAILED: RIGHT ULNAR DORSAL HAND
LOCATION DETAILED: RIGHT PROXIMAL DORSAL FOREARM
LOCATION DETAILED: LEFT RIB CAGE
LOCATION DETAILED: LEFT INFERIOR UPPER BACK
LOCATION DETAILED: LEFT LATERAL SUPERIOR CHEST
LOCATION DETAILED: LEFT ANTERIOR PROXIMAL THIGH
LOCATION DETAILED: RIGHT POSTERIOR SHOULDER
LOCATION DETAILED: RIGHT CENTRAL LATERAL NECK
LOCATION DETAILED: LEFT INFERIOR CENTRAL MALAR CHEEK
LOCATION DETAILED: LEFT INFERIOR LATERAL MALAR CHEEK
LOCATION DETAILED: LEFT MID-UPPER BACK
LOCATION DETAILED: RIGHT MEDIAL SUPERIOR CHEST
LOCATION DETAILED: LEFT PROXIMAL DORSAL FOREARM
LOCATION DETAILED: LEFT POSTERIOR SHOULDER
LOCATION DETAILED: RIGHT ANTERIOR DISTAL THIGH
LOCATION DETAILED: LEFT PROXIMAL PRETIBIAL REGION

## 2022-01-18 ASSESSMENT — LOCATION SIMPLE DESCRIPTION DERM
LOCATION SIMPLE: LEFT CHEEK
LOCATION SIMPLE: LEFT SHOULDER
LOCATION SIMPLE: RIGHT THIGH
LOCATION SIMPLE: LEFT THIGH
LOCATION SIMPLE: NECK
LOCATION SIMPLE: LEFT UPPER BACK
LOCATION SIMPLE: CHEST
LOCATION SIMPLE: LEFT PRETIBIAL REGION
LOCATION SIMPLE: LEFT FOREARM
LOCATION SIMPLE: RIGHT HAND
LOCATION SIMPLE: ABDOMEN
LOCATION SIMPLE: LEFT BREAST
LOCATION SIMPLE: RIGHT FOREARM
LOCATION SIMPLE: RIGHT SHOULDER

## 2022-01-18 NOTE — PROCEDURE: LIQUID NITROGEN
Detail Level: Detailed
Show Applicator Variable?: Yes
Consent: The patient's consent was obtained including but not limited to risks of crusting, scabbing, blistering, scarring, darker or lighter pigmentary change, recurrence, incomplete removal and infection.
Post-Care Instructions: I reviewed with the patient in detail post-care instructions. Patient is to wear sunprotection, and avoid picking at any of the treated lesions. Pt may apply Vaseline to crusted or scabbing areas.
Duration Of Freeze Thaw-Cycle (Seconds): 2
Render Post-Care Instructions In Note?: no
Number Of Freeze-Thaw Cycles: 2 freeze-thaw cycles

## 2022-01-18 NOTE — PROCEDURE: LIQUID NITROGEN (COSMETIC)
Price (Use Numbers Only, No Special Characters Or $): 0.00
Spray Paint Technique: No
Spray Paint Text: The liquid nitrogen was applied to the skin utilizing a spray paint frosting technique.
Consent: The patient's consent was obtained including but not limited to risks of crusting, scabbing, blistering, scarring, darker or lighter pigmentary change, recurrence, incomplete removal and infection. The patient understands that the procedure is cosmetic in nature and is not covered by insurance.
Post-Care Instructions: I reviewed with the patient in detail post-care instructions. Patient is to wear sunprotection, and avoid picking at any of the treated lesions. Pt may apply Vaseline to crusted or scabbing areas.
Billing Information: Bill by Static Price
Detail Level: Detailed
Show Spray Paint Technique Variable?: Yes

## 2022-01-18 NOTE — PROCEDURE: TREATMENT REGIMEN
Plan: Discussed KTP Laser, quoted $200 on nose only or $400 to treat face
Detail Level: Detailed
Initiate Treatment: metronidazole 0.75 % lotion Apply to face twice a day

## 2022-01-24 ENCOUNTER — PATIENT MESSAGE (OUTPATIENT)
Dept: HEALTH INFORMATION MANAGEMENT | Facility: OTHER | Age: 72
End: 2022-01-24
Payer: MEDICARE

## 2022-02-10 ENCOUNTER — DOCUMENTATION (OUTPATIENT)
Dept: VASCULAR LAB | Facility: MEDICAL CENTER | Age: 72
End: 2022-02-10

## 2022-02-10 DIAGNOSIS — I72.8 ANEURYSM OF SPLENIC ARTERY (HCC): ICD-10-CM

## 2022-03-11 ENCOUNTER — HOSPITAL ENCOUNTER (OUTPATIENT)
Dept: LAB | Facility: MEDICAL CENTER | Age: 72
End: 2022-03-11
Attending: NURSE PRACTITIONER
Payer: MEDICARE

## 2022-03-11 DIAGNOSIS — Z79.02 LONG TERM (CURRENT) USE OF ANTITHROMBOTICS/ANTIPLATELETS: ICD-10-CM

## 2022-03-11 DIAGNOSIS — E78.5 DYSLIPIDEMIA: ICD-10-CM

## 2022-03-11 LAB
ALBUMIN SERPL BCP-MCNC: 4.1 G/DL (ref 3.2–4.9)
ALBUMIN/GLOB SERPL: 2 G/DL
ALP SERPL-CCNC: 90 U/L (ref 30–99)
ALT SERPL-CCNC: 13 U/L (ref 2–50)
ANION GAP SERPL CALC-SCNC: 11 MMOL/L (ref 7–16)
AST SERPL-CCNC: 23 U/L (ref 12–45)
BILIRUB SERPL-MCNC: 0.5 MG/DL (ref 0.1–1.5)
BUN SERPL-MCNC: 13 MG/DL (ref 8–22)
CALCIUM SERPL-MCNC: 9.4 MG/DL (ref 8.5–10.5)
CHLORIDE SERPL-SCNC: 106 MMOL/L (ref 96–112)
CHOLEST SERPL-MCNC: 139 MG/DL (ref 100–199)
CO2 SERPL-SCNC: 25 MMOL/L (ref 20–33)
CREAT SERPL-MCNC: 0.71 MG/DL (ref 0.5–1.4)
GLOBULIN SER CALC-MCNC: 2.1 G/DL (ref 1.9–3.5)
GLUCOSE SERPL-MCNC: 77 MG/DL (ref 65–99)
HDLC SERPL-MCNC: 58 MG/DL
LDLC SERPL CALC-MCNC: 57 MG/DL
POTASSIUM SERPL-SCNC: 4.7 MMOL/L (ref 3.6–5.5)
PROT SERPL-MCNC: 6.2 G/DL (ref 6–8.2)
SODIUM SERPL-SCNC: 142 MMOL/L (ref 135–145)
TRIGL SERPL-MCNC: 120 MG/DL (ref 0–149)

## 2022-03-11 PROCEDURE — 80053 COMPREHEN METABOLIC PANEL: CPT

## 2022-03-11 PROCEDURE — 36415 COLL VENOUS BLD VENIPUNCTURE: CPT

## 2022-03-11 PROCEDURE — 80061 LIPID PANEL: CPT

## 2022-03-23 ENCOUNTER — HOSPITAL ENCOUNTER (OUTPATIENT)
Dept: LAB | Facility: MEDICAL CENTER | Age: 72
End: 2022-03-23
Attending: OTOLARYNGOLOGY
Payer: MEDICARE

## 2022-03-23 PROCEDURE — 82785 ASSAY OF IGE: CPT

## 2022-03-23 PROCEDURE — 36415 COLL VENOUS BLD VENIPUNCTURE: CPT

## 2022-03-27 LAB — IGE SERPL-ACNC: 14 KU/L

## 2022-03-28 ENCOUNTER — HOSPITAL ENCOUNTER (OUTPATIENT)
Dept: RADIOLOGY | Facility: MEDICAL CENTER | Age: 72
End: 2022-03-28
Attending: NURSE PRACTITIONER
Payer: MEDICARE

## 2022-03-28 ENCOUNTER — DOCUMENTATION (OUTPATIENT)
Dept: VASCULAR LAB | Facility: MEDICAL CENTER | Age: 72
End: 2022-03-28
Payer: MEDICARE

## 2022-03-28 DIAGNOSIS — I72.8 ANEURYSM OF SPLENIC ARTERY (HCC): ICD-10-CM

## 2022-03-28 PROCEDURE — 700117 HCHG RX CONTRAST REV CODE 255: Performed by: NURSE PRACTITIONER

## 2022-03-28 PROCEDURE — 74175 CTA ABDOMEN W/CONTRAST: CPT | Mod: MG

## 2022-03-28 RX ADMIN — IOHEXOL 80 ML: 350 INJECTION, SOLUTION INTRAVENOUS at 11:45

## 2022-03-29 NOTE — PROGRESS NOTES
CTA with unchanged splenic artery aneurysms  We will discuss with patient follow-up visit  Anticipate repeat CT in approximately 3 years (not on calendar)    Michael Bloch, MD  Vascular Medicine

## 2022-04-04 ENCOUNTER — OFFICE VISIT (OUTPATIENT)
Dept: VASCULAR LAB | Facility: MEDICAL CENTER | Age: 72
End: 2022-04-04
Attending: INTERNAL MEDICINE
Payer: MEDICARE

## 2022-04-04 VITALS
BODY MASS INDEX: 23.46 KG/M2 | SYSTOLIC BLOOD PRESSURE: 112 MMHG | HEART RATE: 86 BPM | DIASTOLIC BLOOD PRESSURE: 72 MMHG | WEIGHT: 146 LBS | HEIGHT: 66 IN

## 2022-04-04 DIAGNOSIS — I65.23 CAROTID ATHEROSCLEROSIS, BILATERAL: ICD-10-CM

## 2022-04-04 DIAGNOSIS — I72.8 ANEURYSM OF SPLENIC ARTERY (HCC): ICD-10-CM

## 2022-04-04 DIAGNOSIS — E78.5 DYSLIPIDEMIA: ICD-10-CM

## 2022-04-04 DIAGNOSIS — Z79.02 LONG TERM (CURRENT) USE OF ANTITHROMBOTICS/ANTIPLATELETS: ICD-10-CM

## 2022-04-04 DIAGNOSIS — R00.2 PALPITATIONS: ICD-10-CM

## 2022-04-04 PROCEDURE — 99213 OFFICE O/P EST LOW 20 MIN: CPT | Performed by: NURSE PRACTITIONER

## 2022-04-04 PROCEDURE — 99212 OFFICE O/P EST SF 10 MIN: CPT

## 2022-04-04 RX ORDER — AZELASTINE HYDROCHLORIDE 137 UG/1
SPRAY, METERED NASAL
COMMUNITY
Start: 2022-02-24 | End: 2022-08-31

## 2022-04-04 RX ORDER — METRONIDAZOLE 7.5 MG/G
LOTION TOPICAL
COMMUNITY
Start: 2022-01-18 | End: 2022-08-31

## 2022-04-04 RX ORDER — PREDNISONE 10 MG/1
TABLET ORAL
COMMUNITY
Start: 2022-03-23 | End: 2022-04-04

## 2022-04-04 ASSESSMENT — ENCOUNTER SYMPTOMS
BLOOD IN STOOL: 0
BRUISES/BLEEDS EASILY: 0
PALPITATIONS: 0
TREMORS: 0
ABDOMINAL PAIN: 0
DIZZINESS: 0
WHEEZING: 0
FOCAL WEAKNESS: 0
HEADACHES: 0
HEMOPTYSIS: 0
BLURRED VISION: 0
MYALGIAS: 0
DOUBLE VISION: 0
SEIZURES: 0
SHORTNESS OF BREATH: 0
WEAKNESS: 0
COUGH: 0

## 2022-04-04 ASSESSMENT — FIBROSIS 4 INDEX: FIB4 SCORE: 1.22

## 2022-04-04 NOTE — PROGRESS NOTES
FOLLOW-UP VASCULAR VISIT  Subjective:   Isabel Peralta is a 71 y.o. female who presents today 2022 for   No chief complaint on file.      HPI:    Hyperlipidemia:    Stable, no current concerns  Current treatment: rosuva 5mg 4 times a week + Zetia daily  Myalgias? No  Other adverse drug reactions? No  Lipid profile: 21 NonHDL-89, LDL-69      Carotid artery disease:   No h/o tia or cva symptoms including presyncope, syncope or HA.    Splenic a. Aneurysm - denies LUQ pain or dullness, stable on prior serial exams.    Antiplat: Taking ASA w/o bleeding issues.     Social History     Tobacco Use   Smoking Status Former Smoker   • Packs/day: 0.50   • Years: 4.00   • Pack years: 2.00   • Quit date: 1971   • Years since quittin.2   Smokeless Tobacco Never Used   Tobacco Comment    quit 49 years ago     Social History     Tobacco Use   • Smoking status: Former Smoker     Packs/day: 0.50     Years: 4.00     Pack years: 2.00     Quit date: 1971     Years since quittin.2   • Smokeless tobacco: Never Used   • Tobacco comment: quit 49 years ago   Vaping Use   • Vaping Use: Never used   Substance Use Topics   • Alcohol use: Not Currently     Comment: on the holidays   • Drug use: No     Outpatient Encounter Medications as of 2022   Medication Sig Dispense Refill   • ezetimibe (ZETIA) 10 MG Tab Take 1 tablet by mouth once daily 90 Tablet 3   • potassium chloride ER (KLOR-CON) 10 MEQ tablet Take 1 Tablet by mouth 2 times a day. 20 Tablet 3   • loperamide (IMODIUM A-D) 2 MG tablet Take 2 tablets for diarrhea and one tablet after each loose stool to max 8 tablets daily 20 Tablet 0   • pantoprazole (PROTONIX) 40 MG Tablet Delayed Response      • rosuvastatin (CRESTOR) 5 MG Tab TAKE 1 TABLET BY MOUTH ONCE DAILY IN THE EVENING 100 tablet 1   • Ascorbic Acid (VITAMIN C) 1000 MG Tab Take 2 Tabs by mouth every day.     • Calcium Carbonate-Vitamin D (CALCIUM-VITAMIN D3 PO) Take  by mouth.     •  "ibuprofen (MOTRIN) 200 MG TABS Take 200 mg by mouth every 6 hours as needed.     • estradiol (VAGIFEM) 10 MCG TABS Insert 10 mcg in vagina. Twice a week     • aspirin 81 MG tablet Take 81 mg by mouth every day.     • triamcinolone (NASACORT) 55 MCG/ACT nasal inhaler Spray 1 Spray in nose every day.       No facility-administered encounter medications on file as of 4/4/2022.     Allergies   Allergen Reactions   • Gluten Meal      Belching, burping, bloating, \"stomach churning\"     DIET AND EXERCISE:  Weight Change: stable  BMI Readings from Last 4 Encounters:   12/02/21 23.56 kg/m²   11/28/21 23.57 kg/m²   06/22/21 23.89 kg/m²   01/05/21 23.76 kg/m²      Diet: gluten-free with high volume veg/fruits due to celiac dz   Exercise: moderate regular exercise program     Review of Systems   Constitutional: Negative for malaise/fatigue.   HENT: Negative for nosebleeds.    Eyes: Negative for blurred vision and double vision.   Respiratory: Negative for cough, hemoptysis, shortness of breath and wheezing.    Cardiovascular: Negative for chest pain, palpitations and leg swelling.   Gastrointestinal: Negative for abdominal pain and blood in stool.   Genitourinary: Negative for hematuria.   Musculoskeletal: Negative for joint pain and myalgias.   Neurological: Negative for dizziness, tremors, focal weakness, seizures, weakness and headaches.   Endo/Heme/Allergies: Does not bruise/bleed easily.        Objective:     There were no vitals filed for this visit.   BP Readings from Last 4 Encounters:   12/02/21 147/91   11/28/21 138/80   06/22/21 115/74   01/05/21 128/86     There is no height or weight on file to calculate BMI.  Physical Exam  Vitals reviewed.   Constitutional:       General: She is not in acute distress.     Appearance: She is well-developed. She is not diaphoretic.   Neck:      Thyroid: No thyromegaly.      Vascular: No JVD.   Cardiovascular:      Rate and Rhythm: Normal rate and regular rhythm.      Heart sounds: " Normal heart sounds. No murmur heard.  Pulmonary:      Effort: Pulmonary effort is normal. No respiratory distress.      Breath sounds: Normal breath sounds. No wheezing.   Musculoskeletal:         General: No swelling. Normal range of motion.      Cervical back: Normal range of motion and neck supple.   Skin:     General: Skin is warm and dry.      Coloration: Skin is not pale.   Neurological:      Mental Status: She is alert and oriented to person, place, and time.      Coordination: Coordination normal.      Deep Tendon Reflexes: Reflexes are normal and symmetric.   Psychiatric:         Mood and Affect: Mood normal.         Behavior: Behavior normal.       Lab Results   Component Value Date    CHOLSTRLTOT 139 03/11/2022    LDL 57 03/11/2022    HDL 58 03/11/2022    TRIGLYCERIDE 120 03/11/2022           Lab Results   Component Value Date    SODIUM 142 03/11/2022    POTASSIUM 4.7 03/11/2022    CHLORIDE 106 03/11/2022    CO2 25 03/11/2022    GLUCOSE 77 03/11/2022    BUN 13 03/11/2022    CREATININE 0.71 03/11/2022    IFAFR[place] >60 03/11/2022    IFNOTAFR >60 03/11/2022        Lab Results   Component Value Date    WBC 7.9 12/02/2021    RBC 5.06 12/02/2021    HEMOGLOBIN 15.6 12/02/2021    HEMATOCRIT 44.7 12/02/2021    MCV 88.3 12/02/2021    MCH 30.8 12/02/2021    MCHC 34.9 12/02/2021    MPV 9.5 12/02/2021      CTA abdomen aug 2016:  1.  There is no change in 2 small splenic artery aneurysms measuring 10-11 mm in diameter with minimal peripheral thrombus and some peripheral calcification.  2.  There is mild atherosclerosis of the abdominal aorta.  3.  There is normal variant branching at the celiac axis with the common hepatic artery originating directly from the abdominal aorta at the level of the celiac axis.  4.  There may be a small hiatal hernia again seen    Stress MPI Jan 2017:  NUCLEAR IMAGING INTERPRETATION   No evidence of significant jeopardized viable myocardium or prior myocardial    infarction.   Normal left  ventricular wall motion.  LV ejection fraction = 77%.   ECG INTERPRETATION   Negative stress ECG for ischemia.    Carotid duplex april 2017:  1.  There is a mild amount of atherosclerotic plaque.  Plaque is located in carotid bulbs and proximal internal and external carotid arteries.  Plaque characterization:  Calcific and irregular  2.  There is no evidence of carotid occlusion.  3. Vertebral arteries demonstrate antegrade flow.  4. Diameter reduction in the internal carotid arteries: less than 50%. There is no hemodynamically significant stenosis.    MRI head april 2017:  MRI of the brain without and with contrast within normal limits.    CTA abd/pelvis 4/8/19  1.  There is no interval change in 2 small splenic artery aneurysms each measuring 10-11 mm in diameter with minimal peripheral thrombus and calcification.  2.  There is mild atherosclerosis of the abdominal aorta.  3.  There are no suspicious parenchymal organ findings.  Variant anatomy with the common hepatic artery originating directly from the aorta is again noted. Celiac axis and SMA are patent. CYNTHIA is patent. Both renal arteries are patent  .  Total Vascular screening 5/22/20    1. Mild atherosclerosis as detailed above.   2. No significant stenosis seen.    CTA ABD 3/28/22    1.  2 partially calcified splenic artery aneurysms unchanged compared to previous.     2.  Mild/moderate calcified plaque abdominal aorta. No aneurysmal dilatation and no dissection.    Medical Decision Making:  Today's Assessment / Status / Plan:     1. Carotid atherosclerosis, bilateral     2. Dyslipidemia     3. Long term (current) use of antithrombotics/antiplatelets     4. Palpitations     5. Splenic artery aneurysm (HCC)       Patient Type: Primary Prevention.    Etiology of Established CVD if Present:   1) Mild carotid atherosclerosis, asymptomatic, no previous intervention  2) Splenic artery aneurysm - stable over time    Lipid Management: Qualifies for Statin Therapy  Based on 2013 ACC/AHA Guidelines: yes  Calculated 10-Year Risk of ASCVD: 10.4%  Currently on Statin:  Crestor 5mg 4x/week  On Zetia daily  Given subclinical atherosclerosis and FH would like to treat as high risk with goal LDL-C <70 and nonHDL <100.    Lp(a) normal.   - currently at goal:  Yes   Plan:  - continue zetia 10mg daily   - continue Crestor 5 mg  4x per week   - stop and call if any myalgias or abdominal symptoms  - continue gluten-free diet  - follow bloodwork q6-12mo    Blood Pressure Management: ACC/AHA (2017) goal <130/80  Home BP at goal: yes  Office BP at goal:  Yes  Echo: not done   ACR: note done   Plan:   Monitoring:   - continue home BP monitoring, reviewed correct technique:  Yes   - order 24h ABPM:  NO  Medications: no meds indicated at this time   -Monitor and record home BP and bring into next visit      Glycemic Status: Normal    Anti-Platelet/Anti-Coagulant Tx: yes  - continue low dose asa    Smoking: continued complete avoidance of all tobacco products     Physical Activity: continue healthy activity to improve CV fitness    Weight Management and Nutrition: Dietary plan was discussed with patient at this visit including DASH, low sodium       Other:     1) Mild carotid atherosclerosis, asymptomatic, no previous intervention.  - Continue medical management.    - Repeat duplex every 3 years or so (next in 2023) or with symptoms     2) Splenic artery aneurysm - stable over time.    - Repeat CTA every 3 yrs (2025)    3) palpitations - 1x event; no recurrence.  Had visit with cards, no monitor.    Instructed to follow-up with PCP for remainder of adult medical needs: yes  We will partner with other providers in the management of established vascular disease and cardiometabolic risk factors.    Studies to Be Obtained: 1) CTA abdomen in 3 years (4/2025),   2) carotid duplex (4/2023) vascular scan   Labs to Be Obtained: cmp, lipid prior to next visit   Time: 22 min - - chart review/prep, review of  other providers' records, imaging/lab review, face-to-face time for history/examination, ordering, prescribing,  review of results/meds/ treatment plan with patient/family/caregiver, documentation in EMR, care coordination (as needed)    Follow up in: 1yr  NOAH Earl.      Cc:  ANJEL Bean MD

## 2022-05-18 ENCOUNTER — TELEPHONE (OUTPATIENT)
Dept: HEALTH INFORMATION MANAGEMENT | Facility: OTHER | Age: 72
End: 2022-05-18

## 2022-05-18 NOTE — TELEPHONE ENCOUNTER
Outcome: NO ANSWER / NO MESSAGE MYLES    Please transfer to Patient Outreach Team at [phone] when patient returns call.    Attempt # 2

## 2022-07-19 ENCOUNTER — APPOINTMENT (RX ONLY)
Dept: URBAN - METROPOLITAN AREA CLINIC 35 | Facility: CLINIC | Age: 72
Setting detail: DERMATOLOGY
End: 2022-07-19

## 2022-07-19 DIAGNOSIS — L82.1 OTHER SEBORRHEIC KERATOSIS: ICD-10-CM

## 2022-07-19 DIAGNOSIS — Z71.89 OTHER SPECIFIED COUNSELING: ICD-10-CM

## 2022-07-19 DIAGNOSIS — D22 MELANOCYTIC NEVI: ICD-10-CM

## 2022-07-19 DIAGNOSIS — Z85.828 PERSONAL HISTORY OF OTHER MALIGNANT NEOPLASM OF SKIN: ICD-10-CM

## 2022-07-19 DIAGNOSIS — L71.8 OTHER ROSACEA: ICD-10-CM

## 2022-07-19 DIAGNOSIS — L72.8 OTHER FOLLICULAR CYSTS OF THE SKIN AND SUBCUTANEOUS TISSUE: ICD-10-CM

## 2022-07-19 DIAGNOSIS — L57.0 ACTINIC KERATOSIS: ICD-10-CM

## 2022-07-19 DIAGNOSIS — D18.0 HEMANGIOMA: ICD-10-CM

## 2022-07-19 DIAGNOSIS — L81.4 OTHER MELANIN HYPERPIGMENTATION: ICD-10-CM

## 2022-07-19 DIAGNOSIS — D17 BENIGN LIPOMATOUS NEOPLASM: ICD-10-CM

## 2022-07-19 DIAGNOSIS — L82.0 INFLAMED SEBORRHEIC KERATOSIS: ICD-10-CM

## 2022-07-19 DIAGNOSIS — Z87.2 PERSONAL HISTORY OF DISEASES OF THE SKIN AND SUBCUTANEOUS TISSUE: ICD-10-CM

## 2022-07-19 DIAGNOSIS — B07.8 OTHER VIRAL WARTS: ICD-10-CM

## 2022-07-19 PROBLEM — D18.01 HEMANGIOMA OF SKIN AND SUBCUTANEOUS TISSUE: Status: ACTIVE | Noted: 2022-07-19

## 2022-07-19 PROBLEM — D22.61 MELANOCYTIC NEVI OF RIGHT UPPER LIMB, INCLUDING SHOULDER: Status: ACTIVE | Noted: 2022-07-19

## 2022-07-19 PROBLEM — D17.24 BENIGN LIPOMATOUS NEOPLASM OF SKIN AND SUBCUTANEOUS TISSUE OF LEFT LEG: Status: ACTIVE | Noted: 2022-07-19

## 2022-07-19 PROBLEM — D23.5 OTHER BENIGN NEOPLASM OF SKIN OF TRUNK: Status: ACTIVE | Noted: 2022-07-19

## 2022-07-19 PROBLEM — D17.1 BENIGN LIPOMATOUS NEOPLASM OF SKIN AND SUBCUTANEOUS TISSUE OF TRUNK: Status: ACTIVE | Noted: 2022-07-19

## 2022-07-19 PROBLEM — D17.21 BENIGN LIPOMATOUS NEOPLASM OF SKIN AND SUBCUTANEOUS TISSUE OF RIGHT ARM: Status: ACTIVE | Noted: 2022-07-19

## 2022-07-19 PROBLEM — D17.23 BENIGN LIPOMATOUS NEOPLASM OF SKIN AND SUBCUTANEOUS TISSUE OF RIGHT LEG: Status: ACTIVE | Noted: 2022-07-19

## 2022-07-19 PROBLEM — D17.22 BENIGN LIPOMATOUS NEOPLASM OF SKIN AND SUBCUTANEOUS TISSUE OF LEFT ARM: Status: ACTIVE | Noted: 2022-07-19

## 2022-07-19 PROCEDURE — 17000 DESTRUCT PREMALG LESION: CPT | Mod: 59

## 2022-07-19 PROCEDURE — ? COUNSELING

## 2022-07-19 PROCEDURE — 99213 OFFICE O/P EST LOW 20 MIN: CPT | Mod: 25

## 2022-07-19 PROCEDURE — ? BENIGN DESTRUCTION

## 2022-07-19 PROCEDURE — ? LIQUID NITROGEN

## 2022-07-19 PROCEDURE — 17110 DESTRUCTION B9 LES UP TO 14: CPT | Mod: 52

## 2022-07-19 PROCEDURE — ? TREATMENT REGIMEN

## 2022-07-19 PROCEDURE — ? SUNSCREEN RECOMMENDATIONS

## 2022-07-19 ASSESSMENT — LOCATION SIMPLE DESCRIPTION DERM
LOCATION SIMPLE: RIGHT THIGH
LOCATION SIMPLE: LEFT CHEEK
LOCATION SIMPLE: CHEST
LOCATION SIMPLE: LEFT UPPER BACK
LOCATION SIMPLE: ABDOMEN
LOCATION SIMPLE: RIGHT FOREARM
LOCATION SIMPLE: RIGHT UPPER BACK
LOCATION SIMPLE: LEFT FOREARM
LOCATION SIMPLE: LEFT THIGH
LOCATION SIMPLE: LEFT LOWER LIP
LOCATION SIMPLE: LEFT CLAVICULAR SKIN
LOCATION SIMPLE: LEFT PRETIBIAL REGION
LOCATION SIMPLE: LEFT SHOULDER
LOCATION SIMPLE: UPPER BACK
LOCATION SIMPLE: RIGHT SHOULDER

## 2022-07-19 ASSESSMENT — LOCATION ZONE DERM
LOCATION ZONE: LEG
LOCATION ZONE: LIP
LOCATION ZONE: TRUNK
LOCATION ZONE: FACE
LOCATION ZONE: ARM

## 2022-07-19 ASSESSMENT — LOCATION DETAILED DESCRIPTION DERM
LOCATION DETAILED: LEFT RIB CAGE
LOCATION DETAILED: RIGHT MEDIAL SUPERIOR CHEST
LOCATION DETAILED: LEFT INFERIOR UPPER BACK
LOCATION DETAILED: RIGHT POSTERIOR SHOULDER
LOCATION DETAILED: LEFT CLAVICULAR SKIN
LOCATION DETAILED: LEFT MID-UPPER BACK
LOCATION DETAILED: LEFT PROXIMAL DORSAL FOREARM
LOCATION DETAILED: RIGHT ANTERIOR DISTAL THIGH
LOCATION DETAILED: LEFT POSTERIOR SHOULDER
LOCATION DETAILED: LEFT ANTERIOR PROXIMAL THIGH
LOCATION DETAILED: LEFT PROXIMAL PRETIBIAL REGION
LOCATION DETAILED: RIGHT DISTAL DORSAL FOREARM
LOCATION DETAILED: SUPERIOR THORACIC SPINE
LOCATION DETAILED: LEFT INFERIOR VERMILION BORDER
LOCATION DETAILED: LEFT LATERAL SUPERIOR CHEST
LOCATION DETAILED: LEFT INFERIOR LATERAL MALAR CHEEK
LOCATION DETAILED: RIGHT PROXIMAL DORSAL FOREARM
LOCATION DETAILED: RIGHT SUPERIOR UPPER BACK

## 2022-07-19 NOTE — PROCEDURE: TREATMENT REGIMEN
Plan: Discussed KTP Laser, quoted $200 on nose only or $400 to treat face
Detail Level: Detailed
Discontinue Regimen: - metronidazole 0.75 % lotion Apply to face twice a day. Redness got worse with metro

## 2022-07-19 NOTE — PROCEDURE: MIPS QUALITY
Quality 130: Documentation Of Current Medications In The Medical Record: Current Medications Documented
Detail Level: Generalized
Quality 402: Tobacco Use And Help With Quitting Among Adolescents: Patient screened for tobacco and is an ex-smoker
Quality 226: Preventive Care And Screening: Tobacco Use: Screening And Cessation Intervention: Patient screened for tobacco use and is an ex/non-smoker
Quality 111:Pneumonia Vaccination Status For Older Adults: Pneumococcal vaccine administered on or after patient’s 60th birthday and before the end of the measurement period

## 2022-07-19 NOTE — PROCEDURE: LIQUID NITROGEN
Number Of Freeze-Thaw Cycles: 2 freeze-thaw cycles
Render Note In Bullet Format When Appropriate: No
Show Applicator Variable?: Yes
Duration Of Freeze Thaw-Cycle (Seconds): 2
Consent: The patient's consent was obtained including but not limited to risks of crusting, scabbing, blistering, scarring, darker or lighter pigmentary change, recurrence, incomplete removal and infection.
Post-Care Instructions: I reviewed with the patient in detail post-care instructions. Patient is to wear sunprotection, and avoid picking at any of the treated lesions. Pt may apply Vaseline to crusted or scabbing areas.
Detail Level: Detailed
Medical Necessity Clause: This procedure was medically necessary because the lesions that were treated were:
Medical Necessity Information: It is in your best interest to select a reason for this procedure from the list below. All of these items fulfill various CMS LCD requirements except the new and changing color options.
Spray Paint Text: The liquid nitrogen was applied to the skin utilizing a spray paint frosting technique.

## 2022-07-19 NOTE — PROCEDURE: BENIGN DESTRUCTION
Render Post-Care Instructions In Note?: no
Detail Level: Detailed
Include Z78.9 (Other Specified Conditions Influencing Health Status) As An Associated Diagnosis?: Yes
Post-Care Instructions: I reviewed with the patient in detail post-care instructions. Patient is to wear sunprotection, and avoid picking at any of the treated lesions. Pt may apply Vaseline to crusted or scabbing areas.\\nCantharidin to be washed off in four hours
Treatment Number (Will Not Render If 0): 0
Medical Necessity Clause: This procedure was medically necessary because the lesions that were treated were:
Anesthesia Volume In Cc: 0.2
Medical Necessity Information: It is in your best interest to select a reason for this procedure from the list below. All of these items fulfill various CMS LCD requirements except the new and changing color options.
Consent: The patient's consent was obtained including but not limited to risks of crusting, scabbing, blistering, scarring, darker or lighter pigmentary change, recurrence, incomplete removal and infection.

## 2022-08-31 ENCOUNTER — APPOINTMENT (OUTPATIENT)
Dept: RADIOLOGY | Facility: MEDICAL CENTER | Age: 72
End: 2022-08-31
Attending: EMERGENCY MEDICINE
Payer: MEDICARE

## 2022-08-31 ENCOUNTER — HOSPITAL ENCOUNTER (EMERGENCY)
Facility: MEDICAL CENTER | Age: 72
End: 2022-08-31
Attending: EMERGENCY MEDICINE
Payer: MEDICARE

## 2022-08-31 ENCOUNTER — OFFICE VISIT (OUTPATIENT)
Dept: URGENT CARE | Facility: CLINIC | Age: 72
End: 2022-08-31
Payer: MEDICARE

## 2022-08-31 VITALS
SYSTOLIC BLOOD PRESSURE: 128 MMHG | RESPIRATION RATE: 16 BRPM | DIASTOLIC BLOOD PRESSURE: 76 MMHG | HEART RATE: 100 BPM | TEMPERATURE: 98.3 F | WEIGHT: 141.2 LBS | BODY MASS INDEX: 22.69 KG/M2 | OXYGEN SATURATION: 97 % | HEIGHT: 66 IN

## 2022-08-31 VITALS
DIASTOLIC BLOOD PRESSURE: 78 MMHG | RESPIRATION RATE: 18 BRPM | OXYGEN SATURATION: 99 % | HEART RATE: 84 BPM | BODY MASS INDEX: 22.68 KG/M2 | WEIGHT: 141.09 LBS | HEIGHT: 66 IN | SYSTOLIC BLOOD PRESSURE: 154 MMHG | TEMPERATURE: 99 F

## 2022-08-31 DIAGNOSIS — R10.12 LEFT UPPER QUADRANT ABDOMINAL PAIN: ICD-10-CM

## 2022-08-31 DIAGNOSIS — M25.512 ACUTE PAIN OF LEFT SHOULDER: ICD-10-CM

## 2022-08-31 DIAGNOSIS — R82.998 LEUKOCYTES IN URINE: ICD-10-CM

## 2022-08-31 DIAGNOSIS — J90 PLEURAL EFFUSION: ICD-10-CM

## 2022-08-31 LAB
ALBUMIN SERPL BCP-MCNC: 4.3 G/DL (ref 3.2–4.9)
ALBUMIN/GLOB SERPL: 1.4 G/DL
ALP SERPL-CCNC: 105 U/L (ref 30–99)
ALT SERPL-CCNC: 15 U/L (ref 2–50)
ANION GAP SERPL CALC-SCNC: 12 MMOL/L (ref 7–16)
APPEARANCE UR: CLEAR
APPEARANCE UR: NORMAL
AST SERPL-CCNC: 15 U/L (ref 12–45)
BACTERIA #/AREA URNS HPF: ABNORMAL /HPF
BASOPHILS # BLD AUTO: 0.4 % (ref 0–1.8)
BASOPHILS # BLD: 0.04 K/UL (ref 0–0.12)
BILIRUB SERPL-MCNC: 0.7 MG/DL (ref 0.1–1.5)
BILIRUB UR QL STRIP.AUTO: NEGATIVE
BILIRUB UR STRIP-MCNC: NEGATIVE MG/DL
BUN SERPL-MCNC: 13 MG/DL (ref 8–22)
CALCIUM SERPL-MCNC: 9.6 MG/DL (ref 8.4–10.2)
CHLORIDE SERPL-SCNC: 105 MMOL/L (ref 96–112)
CO2 SERPL-SCNC: 22 MMOL/L (ref 20–33)
COLOR UR AUTO: YELLOW
COLOR UR: ABNORMAL
CREAT SERPL-MCNC: 0.62 MG/DL (ref 0.5–1.4)
EOSINOPHIL # BLD AUTO: 0.07 K/UL (ref 0–0.51)
EOSINOPHIL NFR BLD: 0.7 % (ref 0–6.9)
EPI CELLS #/AREA URNS HPF: ABNORMAL /HPF
ERYTHROCYTE [DISTWIDTH] IN BLOOD BY AUTOMATED COUNT: 44.6 FL (ref 35.9–50)
GFR SERPLBLD CREATININE-BSD FMLA CKD-EPI: 93 ML/MIN/1.73 M 2
GLOBULIN SER CALC-MCNC: 3.1 G/DL (ref 1.9–3.5)
GLUCOSE SERPL-MCNC: 101 MG/DL (ref 65–99)
GLUCOSE UR STRIP.AUTO-MCNC: NEGATIVE MG/DL
GLUCOSE UR STRIP.AUTO-MCNC: NEGATIVE MG/DL
HCT VFR BLD AUTO: 44.2 % (ref 37–47)
HGB BLD-MCNC: 14.9 G/DL (ref 12–16)
IMM GRANULOCYTES # BLD AUTO: 0.04 K/UL (ref 0–0.11)
IMM GRANULOCYTES NFR BLD AUTO: 0.4 % (ref 0–0.9)
KETONES UR STRIP.AUTO-MCNC: NEGATIVE MG/DL
KETONES UR STRIP.AUTO-MCNC: NEGATIVE MG/DL
LEUKOCYTE ESTERASE UR QL STRIP.AUTO: ABNORMAL
LEUKOCYTE ESTERASE UR QL STRIP.AUTO: NORMAL
LIPASE SERPL-CCNC: 26 U/L (ref 7–58)
LYMPHOCYTES # BLD AUTO: 1.89 K/UL (ref 1–4.8)
LYMPHOCYTES NFR BLD: 20.1 % (ref 22–41)
MCH RBC QN AUTO: 31 PG (ref 27–33)
MCHC RBC AUTO-ENTMCNC: 33.7 G/DL (ref 33.6–35)
MCV RBC AUTO: 92.1 FL (ref 81.4–97.8)
MICRO URNS: ABNORMAL
MONOCYTES # BLD AUTO: 0.89 K/UL (ref 0–0.85)
MONOCYTES NFR BLD AUTO: 9.4 % (ref 0–13.4)
MUCOUS THREADS #/AREA URNS HPF: ABNORMAL /HPF
NEUTROPHILS # BLD AUTO: 6.49 K/UL (ref 2–7.15)
NEUTROPHILS NFR BLD: 69 % (ref 44–72)
NITRITE UR QL STRIP.AUTO: NEGATIVE
NITRITE UR QL STRIP.AUTO: NEGATIVE
NRBC # BLD AUTO: 0 K/UL
NRBC BLD-RTO: 0 /100 WBC
PH UR STRIP.AUTO: 5.5 [PH] (ref 5–8)
PH UR STRIP.AUTO: 5.5 [PH] (ref 5–8)
PLATELET # BLD AUTO: 295 K/UL (ref 164–446)
PMV BLD AUTO: 10.5 FL (ref 9–12.9)
POTASSIUM SERPL-SCNC: 3.7 MMOL/L (ref 3.6–5.5)
PROT SERPL-MCNC: 7.4 G/DL (ref 6–8.2)
PROT UR QL STRIP: NEGATIVE MG/DL
PROT UR QL STRIP: NEGATIVE MG/DL
RBC # BLD AUTO: 4.8 M/UL (ref 4.2–5.4)
RBC # URNS HPF: ABNORMAL /HPF
RBC UR QL AUTO: ABNORMAL
RBC UR QL AUTO: NEGATIVE
SODIUM SERPL-SCNC: 139 MMOL/L (ref 135–145)
SP GR UR STRIP.AUTO: 1.01
SP GR UR STRIP.AUTO: 1.01
UNIDENT CRYS URNS QL MICRO: ABNORMAL /HPF
UROBILINOGEN UR STRIP-MCNC: 0.2 MG/DL
WBC # BLD AUTO: 9.4 K/UL (ref 4.8–10.8)
WBC #/AREA URNS HPF: ABNORMAL /HPF

## 2022-08-31 PROCEDURE — 81002 URINALYSIS NONAUTO W/O SCOPE: CPT | Performed by: NURSE PRACTITIONER

## 2022-08-31 PROCEDURE — 80053 COMPREHEN METABOLIC PANEL: CPT

## 2022-08-31 PROCEDURE — 99213 OFFICE O/P EST LOW 20 MIN: CPT | Performed by: NURSE PRACTITIONER

## 2022-08-31 PROCEDURE — 99284 EMERGENCY DEPT VISIT MOD MDM: CPT

## 2022-08-31 PROCEDURE — 93000 ELECTROCARDIOGRAM COMPLETE: CPT | Performed by: NURSE PRACTITIONER

## 2022-08-31 PROCEDURE — 74176 CT ABD & PELVIS W/O CONTRAST: CPT

## 2022-08-31 PROCEDURE — 71275 CT ANGIOGRAPHY CHEST: CPT

## 2022-08-31 PROCEDURE — 83690 ASSAY OF LIPASE: CPT

## 2022-08-31 PROCEDURE — 81001 URINALYSIS AUTO W/SCOPE: CPT

## 2022-08-31 PROCEDURE — 85025 COMPLETE CBC W/AUTO DIFF WBC: CPT

## 2022-08-31 PROCEDURE — 700117 HCHG RX CONTRAST REV CODE 255: Performed by: EMERGENCY MEDICINE

## 2022-08-31 PROCEDURE — 36415 COLL VENOUS BLD VENIPUNCTURE: CPT

## 2022-08-31 RX ORDER — ROSUVASTATIN CALCIUM 5 MG/1
5 TABLET, COATED ORAL SEE ADMIN INSTRUCTIONS
Status: SHIPPED | COMMUNITY
End: 2022-12-01 | Stop reason: SDUPTHER

## 2022-08-31 RX ORDER — OXYCODONE HYDROCHLORIDE AND ACETAMINOPHEN 5; 325 MG/1; MG/1
1 TABLET ORAL EVERY 4 HOURS PRN
Qty: 20 TABLET | Refills: 0 | Status: SHIPPED | OUTPATIENT
Start: 2022-08-31 | End: 2022-09-05

## 2022-08-31 RX ADMIN — IOHEXOL 65 ML: 350 INJECTION, SOLUTION INTRAVENOUS at 16:20

## 2022-08-31 ASSESSMENT — ENCOUNTER SYMPTOMS
FEVER: 0
ABDOMINAL PAIN: 1
PALPITATIONS: 0
WHEEZING: 0
BLOOD IN STOOL: 0
HEARTBURN: 0

## 2022-08-31 ASSESSMENT — FIBROSIS 4 INDEX
FIB4 SCORE: 1.24
FIB4 SCORE: 1.24

## 2022-08-31 NOTE — ED NOTES
Pt provided with discharge paper work and education on new medications. Pt to ambulate out of ER once dressed.

## 2022-08-31 NOTE — PROGRESS NOTES
"  Subjective:     Isabel Peralta is a 72 y.o. female who presents for LUQ Pain (\"Started Monday night, experience a lot of gas/ belching. Sharp pain that radiates to back and into shoulder. Pain with deep breaths. I have a history of celiac disease.\"   )      Symptoms started after eating rotisserie chicken, with left back pain on Monday night. Gassy and belching. Pain with inspiration. Pain now under left anterior rib, stating it is dull now. Radiates up in to the shoulder. Worse at night. Thought it could initially be indigestion. Standing helps. Last BM, was a small one this morning. Stating she is possibly mildly constipated. Had a large BM on Monday. No current acid reflux. No hx of h-pylori. Current on pantoprazole. Has been waking up at routine intervals last night to take ibuprofen to control the pain.       LUQ Pain  This is a new problem. The current episode started in the past 7 days. The pain is located in the LUQ. The pain is at a severity of 1/10. Pertinent negatives include no fever.     Past Medical History:   Diagnosis Date    Anemia     history of    Anesthesia     post op nausea    Cancer (HCC)     basal cell skin cancer    Carotid atherosclerosis, bilateral     Celiac disease     Heart burn     High cholesterol     Pain     right shoulder    Splenic artery aneurysm (HCC)        Past Surgical History:   Procedure Laterality Date    COLONOSCOPY - ENDO N/A 7/22/2015    Procedure: COLONOSCOPY - ENDO;  Surgeon: Jef Saavedra M.D.;  Location: [facility];  Service:     SHOULDER DECOMPRESSION ARTHROSCOPIC Right 5/7/2015    Procedure: SHOULDER DECOMPRESSION ARTHROSCOPIC;  Surgeon: Rohan Campos M.D.;  Location: SUR[facility]ERY [facility];  Service:     HARDWARE REMOVAL ORTHO Right 5/7/2015    Procedure: HARDWARE REMOVAL ORTHO;  Surgeon: Rohan Campos M.D.;  Location: [facility];  Service:     SHOULDER ARTHROSCOPY W/ ROTATOR CUFF REPAIR Right 5/7/2015    Procedure: " "SHOULDER ARTHROSCOPY W/ ROTATOR CUFF REPAIR;  Surgeon: Rohan Campos M.D.;  [facility]: [facility];  Service:     SHOULDER ARTHROSCOPY W/ ROTATOR CUFF REPAIR      right    HYSTERECTOMY, TOTAL ABDOMINAL      OTHER ABDOMINAL SURGERY      perforated small intestine    PRIMARY C SECTION  ,,           Social History     Socioeconomic History    Marital status:      Spouse name: Not on file    Number of children: Not on file    Years of education: Not on file    Highest education level: Not on file   Occupational History    Not on file   Tobacco Use    Smoking status: Former     Packs/day: 0.50     Years: 4.00     Pack years: 2.00     Types: Cigarettes     Quit date: 1971     Years since quittin.6    Smokeless tobacco: Never    Tobacco comments:     quit 49 years ago   Vaping Use    Vaping Use: Never used   Substance and Sexual Activity    Alcohol use: Not Currently     Comment: on the holidays    Drug use: No    Sexual activity: Not on file   Other Topics Concern    Not on file   Social History Narrative    Not on file     Social Determinants of Health     Financial Resource Strain: Not on file   Food Insecurity: Not on file   Transportation Needs: Not on file   Physical Activity: Not on file   Stress: Not on file   Social Connections: Not on file   Intimate Partner Violence: Not on file   Housing Stability: Not on file        Family History   Problem Relation Age of Onset    Heart Disease Mother         pci in 60s, cabg in 70s    Hypertension Mother     Hyperlipidemia Mother         Allergies   Allergen Reactions    Gluten Meal      Belching, burping, bloating, \"stomach churning\"       Review of Systems   Constitutional:  Negative for fever.   Respiratory:  Negative for wheezing.    Cardiovascular:  Positive for chest pain. Negative for palpitations and leg swelling.   Gastrointestinal:  Positive for abdominal pain. Negative for blood in stool and heartburn. " "  Musculoskeletal:  Positive for back pain.   All other systems reviewed and are negative.     Objective:   /76 (BP Location: Left arm, Patient Position: Sitting, BP Cuff Size: Adult)   Pulse 100   Temp 36.8 °C (98.3 °F) (Temporal)   Resp 16   Ht 1.676 m (5' 6\")   Wt 64 kg (141 lb 3.2 oz)   SpO2 97%   BMI 22.79 kg/m²     Physical Exam  Vitals reviewed.   Constitutional:       General: She is not in acute distress.     Appearance: She is well-developed.   HENT:      Head: Normocephalic and atraumatic.   Eyes:      Conjunctiva/sclera: Conjunctivae normal.   Cardiovascular:      Rate and Rhythm: Normal rate and regular rhythm.   Pulmonary:      Effort: Pulmonary effort is normal. No respiratory distress.      Breath sounds: Normal breath sounds. No stridor. No wheezing, rhonchi or rales.   Abdominal:      General: There is no distension.      Palpations: Abdomen is soft.      Tenderness: There is abdominal tenderness in the epigastric area. There is no right CVA tenderness or left CVA tenderness.      Comments: Mild epigastric tenderness to palpation. No LUQ abdominal TTP.    Musculoskeletal:         General: Normal range of motion.      Cervical back: Normal range of motion.   Skin:     General: Skin is warm and dry.      Findings: No rash.   Neurological:      General: No focal deficit present.      Mental Status: She is alert and oriented to person, place, and time.      GCS: GCS eye subscore is 4. GCS verbal subscore is 5. GCS motor subscore is 6.   Psychiatric:         Mood and Affect: Mood normal.         Speech: Speech normal.         Behavior: Behavior normal.         Thought Content: Thought content normal.         Judgment: Judgment normal.       Assessment/Plan:   1. Left upper quadrant abdominal pain  - POCT Urinalysis  - EKG - Clinic Performed    2. Acute pain of left shoulder  - EKG - Clinic Performed    3. Leukocytes in urine  - POCT Urinalysis  Results for orders placed or performed in " visit on 08/31/22   POCT Urinalysis   Result Value Ref Range    POC Color Yellow Negative    POC Appearance Slightly Cloudy Negative    POC Leukocyte Esterase Trace Negative    POC Nitrites Negative Negative    POC Urobiligen 0.2 Negative (0.2) mg/dL    POC Protein Negative Negative mg/dL    POC Urine PH 5.5 5.0 - 8.0    POC Blood Negative Negative    POC Specific Gravity 1.010 <1.005 - >1.030    POC Ketones Negative Negative mg/dL    POC Bilirubin Negative Negative mg/dL    POC Glucose Negative Negative mg/dL       Additional symptoms of ACS: Nausea. No dyspnea, vomiting, palpitations, or syncope. No chest and back pain that is described as severe, sharp, with a ripping or tearing quality. No ST elevation. Pt reports the following risk factors: Former history of smoking. History of hyperlipidemia. No history of diabetes or htn.     Discussed concerns for pain radiating in to left shoulder. Is at increased risk for ACS, including age. Unable to fully correlate symptoms to GI association. Hemodynamically stable. Patient referred to follow up emergently for chest pain. Discussed risks and benefits. Patient agrees with plan.     Differential diagnosis, natural history, supportive care, and indications for immediate follow-up discussed.

## 2022-08-31 NOTE — ED PROVIDER NOTES
ED Provider Note    CHIEF COMPLAINT  Chief Complaint   Patient presents with    LLQ Pain    LUQ Pain    Gas       HPI  Isabel Peralta is a 72 y.o. female who presents with a history of celiac disease, lactose intolerance, she reports that since  night she has had intermittent left-sided abdominal pain.  She says she does have abdominal pain issues at baseline but this was worse pain than she is used to.  She says she has been having normal bowel movements.  She has had no nausea.  She said it was tender when she pushed in the left upper abdomen area and radiated to her back.  She thought she was getting get a UTI because it felt like the symptoms at the beginning but then this did not happen.    REVIEW OF SYSTEMS  See HPI for further details. All other systems are negative.     PAST MEDICAL HISTORY   has a past medical history of Anemia, Anesthesia, Cancer (HCC), Carotid atherosclerosis, bilateral, Celiac disease, Heart burn, High cholesterol, Pain, and Splenic artery aneurysm (HCC).    SOCIAL HISTORY  Social History     Tobacco Use    Smoking status: Former     Packs/day: 0.50     Years: 4.00     Pack years: 2.00     Types: Cigarettes     Quit date: 1971     Years since quittin.6    Smokeless tobacco: Never    Tobacco comments:     quit 49 years ago   Vaping Use    Vaping Use: Never used   Substance and Sexual Activity    Alcohol use: Not Currently     Comment: on the holidays    Drug use: No    Sexual activity: Not on file       SURGICAL HISTORY   has a past surgical history that includes shoulder arthroscopy w/ rotator cuff repair (); hysterectomy, total abdominal ('s); primary c section (,,); shoulder decompression arthroscopic (Right, 2015); hardware removal ortho (Right, 2015); shoulder arthroscopy w/ rotator cuff repair (Right, 2015); other abdominal surgery; and colonoscopy - endo (N/A, 2015).    CURRENT MEDICATIONS  Home Medications        "Reviewed by Ana Rosa Vasquez PhT (Pharmacy Tech) on 08/31/22 at 1436  Med List Status: Complete     Medication Last Dose Status   Ascorbic Acid (VITAMIN C GUMMIE PO) 8/30/2022 Active   aspirin 81 MG tablet 8/31/2022 Active   Calcium-Phosphorus-Vitamin D (CALCIUM/D3 ADULT GUMMIES PO) 8/30/2022 Active   estradiol (VAGIFEM) 10 MCG TABS 8/28/2022 Active   ezetimibe (ZETIA) 10 MG Tab 8/30/2022 Active   ibuprofen (MOTRIN) 200 MG TABS 8/31/2022 Active   pantoprazole (PROTONIX) 40 MG Tablet Delayed Response 8/31/2022 Active   rosuvastatin (CRESTOR) 5 MG Tab 8/30/2022 Active   triamcinolone (NASACORT) 55 MCG/ACT nasal inhaler 8/31/2022 Active                    ALLERGIES  Allergies   Allergen Reactions    Gluten Meal      Belching, burping, bloating, \"stomach churning\", constipation, not able to sleep        FAMILY HISTORY  No pertinent family history    PHYSICAL EXAM  VITAL SIGNS: BP (!) 154/78   Pulse 84   Temp 37.2 °C (99 °F) (Temporal)   Resp 18   Ht 1.676 m (5' 6\")   Wt 64 kg (141 lb 1.5 oz)   SpO2 99%   BMI 22.77 kg/m²  @YULIANA[[id number]::@   Pulse ox interpretation: I interpret this pulse ox as normal.  Constitutional: Alert.  HENT: No signs of trauma, Bilateral external ears normal, Nose normal.   Eyes: Pupils are equal and reactive, Conjunctiva normal, Non-icteric.   Neck: Normal range of motion, No tenderness, Supple, No stridor.   Lymphatic: No lymphadenopathy noted.   Cardiovascular: Regular rate and rhythm, no murmurs.   Thorax & Lungs: Normal breath sounds, No respiratory distress, No wheezing, No chest tenderness.   Abdomen: Bowel sounds normal, Soft, No tenderness, No masses, No pulsatile masses. No peritoneal signs.  Skin: Warm, Dry, No erythema, No rash.   Back: No bony tenderness, No CVA tenderness.   Extremities: Intact distal pulses, No edema, No tenderness, No cyanosis.  Musculoskeletal: Good range of motion in all major joints. No tenderness to palpation or major deformities noted.   Neurologic: " Alert , Normal motor function, Normal sensory function, No focal deficits noted.   Psychiatric: Affect normal, Judgment normal, Mood normal.       DIAGNOSTIC STUDIES / PROCEDURES    EKG  I reviewed the patient's EKG from earlier today that was performed at clinic at 12:43 PM.  It is normal sinus rhythm at a rate of 85.  The axis is borderline left axis deviation -15 degrees.  The intervals are normal.  There is no ST elevation or depression.  My impression of this EKG, does not indicate ischemia nor arrhythmia at this time.    LABS  Labs Reviewed   CBC WITH DIFFERENTIAL - Abnormal; Notable for the following components:       Result Value    Lymphocytes 20.10 (*)     Monos (Absolute) 0.89 (*)     All other components within normal limits   COMP METABOLIC PANEL - Abnormal; Notable for the following components:    Glucose 101 (*)     Alkaline Phosphatase 105 (*)     All other components within normal limits   URINALYSIS - Abnormal; Notable for the following components:    Leukocyte Esterase Small (*)     Occult Blood Trace (*)     All other components within normal limits   URINE MICROSCOPIC (W/UA) - Abnormal; Notable for the following components:    Bacteria Rare (*)     All other components within normal limits   LIPASE   ESTIMATED GFR         RADIOLOGY  CT-CTA CHEST PULMONARY ARTERY W/ RECONS   Final Result      1.  No pulmonary embolus is identified      2.  Opacity in the left lower lobe is consistent with an infectious or inflammatory process. Small left pleural effusion      3.  Atherosclerosis            CT-ABDOMEN-PELVIS W/O   Final Result      1.  Nonspecific left lower lobe consolidation could indicate pneumonia with trace left pleural fluid.      2.  No evidence of acute abdominal or pelvic process.              COURSE & MEDICAL DECISION MAKING  Pertinent Labs & Imaging studies reviewed. (See chart for details)    Patient presents with left-sided abdominal pain for 2-1/2 days.  Differential diagnosis  includes ureteral colic, diverticulitis, constipation, celiac disease flareup or lactose intolerance flareup.    CT scan was ordered, labs ordered, urine was ordered.    Patient has trace blood in her urine making ureteral colic more likely, this is why I have ordered a CT scan without contrast.      The patient CT scan shows a pleural effusion and possible lung process.  PE is in the differential.    I ordered a CT of the chest with contrast to evaluate.  This is consistent with a left lower lobe consolidation which is either inflammatory or infectious.    Clinically the patient does not have infection.  She has no fever, normal white count.  She is not on immunosuppressive medications.  At this time I would like the patient to use Aleve, I will prescribe her Percocet.  She will follow-up with her doctor, she may require thoracentesis to determine the cause of her pleural effusion.  She may develop fever and cough and need to return for antibiotics.  At this time I have weighed the pros and cons of placing the patient on antibiotics without clinical signs of infection, we discussed C. difficile colitis, etc.    I reviewed prescription monitoring program for patient's narcotic use before prescribing a scheduled drug.The patient will not drink alcohol nor drive with prescribed medications. The patient will return for new or worsening symptoms and is stable at the time of discharge.    The patient is referred to a primary physician for blood pressure management, diabetic screening, and for all other preventative health concerns.    In prescribing controlled substances to this patient, I certify that I have obtained and reviewed the medical history of Isabel Peralta. I have also made a good brian effort to obtain applicable records from other providers who have treated the patient and records did not demonstrate any increased risk of substance abuse that would prevent me from prescribing controlled  substances.     I have conducted a physical exam and documented it. I have reviewed Ms. Peralta’s prescription history as maintained by the [place] Prescription Monitoring Program.     I have assessed the patient’s risk for abuse, dependency, and addiction using the validated Opioid Risk Tool available at https://www.mdcalc.com/ykdynw-kibj-zwuu-ort-narcotic-abuse.     Given the above, I believe the benefits of controlled substance therapy outweigh the risks. The reasons for prescribing controlled substances include non-narcotic, oral analgesic alternatives have been inadequate for pain control. Accordingly, I have discussed the risk and benefits, treatment plan, and alternative therapies with the patient.       DISPOSITION:  Patient will be discharged home in stable condition.    FOLLOW UP:  [facility], Emergency Dept  [address]  [facility] [id number] [phone]  Follow up  Symptoms worsen, cough, fever    Cari Bean M.D.  [address] [id number] [phone]    Follow up  Call for follow-up, you may require thoracentesis which is removing some fluid from your pleural space    Cari Bean M.D.  [address] [id number] [phone]            OUTPATIENT MEDICATIONS:  New Prescriptions    OXYCODONE-ACETAMINOPHEN (PERCOCET) 5-325 MG TAB    Take 1 Tablet by mouth every four hours as needed (pain) for up to 5 days. No driving, no drinking alcohol         The patient will not drink alcohol nor drive with prescribed medications. The patient will return for worsening symptoms and is stable at the time of discharge. The patient verbalizes understanding and will comply.    FINAL IMPRESSION  1. Pleural effusion  oxyCODONE-acetaminophen (PERCOCET) 5-325 MG Tab              Electronically signed by: Merlin Guardado M.D., 8/31/2022 1:59 PM

## 2022-08-31 NOTE — ED TRIAGE NOTES
Pt states Monday night she went to bed and starting having a lot of burping and LUQ/LLQ pain. It has gotten worse over the last couple of days. Pt states has stomach issues so thought it would go away but the pain isnt normal. Denies N/V/D. States last normal BM yesterday, just had a small amount today.

## 2022-09-06 ENCOUNTER — DOCUMENTATION (OUTPATIENT)
Dept: HEALTH INFORMATION MANAGEMENT | Facility: OTHER | Age: 72
End: 2022-09-06
Payer: MEDICARE

## 2022-09-06 ASSESSMENT — ENCOUNTER SYMPTOMS: BACK PAIN: 1

## 2022-09-13 ENCOUNTER — HOSPITAL ENCOUNTER (OUTPATIENT)
Dept: LAB | Facility: MEDICAL CENTER | Age: 72
End: 2022-09-13
Attending: FAMILY MEDICINE
Payer: MEDICARE

## 2022-09-13 PROCEDURE — 36415 COLL VENOUS BLD VENIPUNCTURE: CPT

## 2022-09-13 PROCEDURE — 86480 TB TEST CELL IMMUN MEASURE: CPT

## 2022-09-21 ENCOUNTER — HOSPITAL ENCOUNTER (OUTPATIENT)
Dept: LAB | Facility: MEDICAL CENTER | Age: 72
End: 2022-09-21
Attending: FAMILY MEDICINE
Payer: MEDICARE

## 2022-09-21 PROCEDURE — 86480 TB TEST CELL IMMUN MEASURE: CPT

## 2022-09-21 PROCEDURE — 36415 COLL VENOUS BLD VENIPUNCTURE: CPT

## 2022-09-23 LAB
GAMMA INTERFERON BACKGROUND BLD IA-ACNC: 0.07 IU/ML
M TB IFN-G BLD-IMP: NEGATIVE
M TB IFN-G CD4+ BCKGRND COR BLD-ACNC: 0.01 IU/ML
MITOGEN IGNF BCKGRD COR BLD-ACNC: >10 IU/ML
QFT TB2 - NIL TBQ2: 0.01 IU/ML

## 2022-11-16 ENCOUNTER — HOSPITAL ENCOUNTER (OUTPATIENT)
Dept: RADIOLOGY | Facility: MEDICAL CENTER | Age: 72
End: 2022-11-16
Attending: INTERNAL MEDICINE
Payer: MEDICARE

## 2022-11-16 ENCOUNTER — OFFICE VISIT (OUTPATIENT)
Dept: SLEEP MEDICINE | Facility: MEDICAL CENTER | Age: 72
End: 2022-11-16
Payer: MEDICARE

## 2022-11-16 VITALS
SYSTOLIC BLOOD PRESSURE: 112 MMHG | OXYGEN SATURATION: 98 % | BODY MASS INDEX: 22.82 KG/M2 | HEIGHT: 66 IN | WEIGHT: 142 LBS | HEART RATE: 82 BPM | DIASTOLIC BLOOD PRESSURE: 80 MMHG

## 2022-11-16 DIAGNOSIS — J90 PLEURAL EFFUSION: ICD-10-CM

## 2022-11-16 PROCEDURE — 99203 OFFICE O/P NEW LOW 30 MIN: CPT | Performed by: INTERNAL MEDICINE

## 2022-11-16 PROCEDURE — 71046 X-RAY EXAM CHEST 2 VIEWS: CPT

## 2022-11-16 ASSESSMENT — ENCOUNTER SYMPTOMS
CARDIOVASCULAR NEGATIVE: 1
CONSTITUTIONAL NEGATIVE: 1
PSYCHIATRIC NEGATIVE: 1
MUSCULOSKELETAL NEGATIVE: 1
GASTROINTESTINAL NEGATIVE: 1
NEUROLOGICAL NEGATIVE: 1
EYES NEGATIVE: 1
RESPIRATORY NEGATIVE: 1

## 2022-11-16 ASSESSMENT — FIBROSIS 4 INDEX: FIB4 SCORE: 0.97

## 2022-11-16 NOTE — PROGRESS NOTES
Pulmonary Consultation    Date of Service: 11/16/2022    Consulting Physician: Cari Bean M.D.    Reason for consult:  Establish Care (Referred by Cari Bean M.D for Pleural effusion, not elsewhere classified) and Results (CTA-Chest 08/31/22)      Problem List Items Addressed This Visit       Pleural effusion     This was seen in August 2022.  Patient is completely asymptomatic.  Unclear if this is still present.  Do not see indication of CT scan at this time however we do need a chest x-ray to assess that the pleural effusion is resolved.  Plan is for chest x-ray if abnormal then will pursue CT scan.    Reviewed with patient patient is completely asymptomatic.  Pt agrees with plan              History of Present Illness: Isabel Peralta is a 72 y.o. female with a past medical history of hypertension hyperlipidemia.  Went to the emergency room due to chest pain found to have a  Tiny pleural effusion on CT chest.  No associated infiltrate.  Patient was given high-dose ibuprofen.  Symptoms improved and resolved.  With no pulmonary complaints or chest pain.      Exercise tolerance:  Walking distance: No issues  [place]: No issues    Night time symptoms: No symptoms    Pulmonary History:    Environmental exposures: no hot tub; no woodstove, no mining work, and no asbestos exposure    Pets: no  Family history of pulmonary disease: no  Second hand smoke exposure: no    Review of Systems   Constitutional: Negative.    HENT: Negative.     Eyes: Negative.    Respiratory: Negative.     Cardiovascular: Negative.    Gastrointestinal: Negative.    Genitourinary: Negative.    Musculoskeletal: Negative.    Skin: Negative.    Neurological: Negative.    Endo/Heme/Allergies: Negative.    Psychiatric/Behavioral: Negative.       Current Outpatient Medications on File Prior to Visit   Medication Sig Dispense Refill    Ascorbic Acid (VITAMIN C GUMMIE PO) Take 2 Tablets by mouth every evening.       Calcium-Phosphorus-Vitamin D (CALCIUM/D3 ADULT GUMMIES PO) Take 2 Tablets by mouth 2 times a day.      rosuvastatin (CRESTOR) 5 MG Tab Take 1 Tablet by mouth see administration instructions. Pt takes on Sun, e, Thur, and Sat      ezetimibe (ZETIA) 10 MG Tab Take 1 tablet by mouth once daily (Patient taking differently: Take 1 Tablet by mouth every evening.) 90 Tablet 3    pantoprazole (PROTONIX) 40 MG Tablet Delayed Response Take 1 Tablet by mouth every day.      ibuprofen (MOTRIN) 200 MG TABS Take 1 Tablet by mouth every 6 hours as needed. Indications: Pain      estradiol (VAGIFEM) 10 MCG TABS Insert 1 Tablet into the vagina see administration instructions. Twice a week, on  and Wed      aspirin 81 MG tablet Take 1 Tablet by mouth every day.      triamcinolone (NASACORT) 55 MCG/ACT nasal inhaler Administer 1 Spray into affected nostril(S) 2 times a day.       No current facility-administered medications on file prior to visit.       Social History     Tobacco Use    Smoking status: Former     Packs/day: 0.50     Years: 4.00     Pack years: 2.00     Types: Cigarettes     Quit date: 1971     Years since quittin.9    Smokeless tobacco: Never    Tobacco comments:     quit 49 years ago   Vaping Use    Vaping Use: Never used   Substance Use Topics    Alcohol use: Not Currently     Comment: on the holidays    Drug use: No        Past Medical History:   Diagnosis Date    Anemia     history of    Anesthesia     post op nausea    Cancer (HCC)     basal cell skin cancer    Carotid atherosclerosis, bilateral     Celiac disease     Heart burn     High cholesterol     Pain     right shoulder    Splenic artery aneurysm (HCC)        Past Surgical History:   Procedure Laterality Date    COLONOSCOPY - ENDO N/A 2015    Procedure: COLONOSCOPY - ENDO;  Surgeon: Jef Saavedra M.D.;  Location: SURGERY [facility];  Service:     SHOULDER DECOMPRESSION ARTHROSCOPIC Right 2015    Procedure: SHOULDER  "DECOMPRESSION ARTHROSCOPIC;  Surgeon: Rohan Campos M.D.;  Location: SURGERY [facility];  Service:     HARDWARE REMOVAL ORTHO Right 2015    Procedure: HARDWARE REMOVAL ORTHO;  Surgeon: Rohan Campos M.D.;  Location: SURGERY [facility];  Service:     SHOULDER ARTHROSCOPY W/ ROTATOR CUFF REPAIR Right 2015    Procedure: SHOULDER ARTHROSCOPY W/ ROTATOR CUFF REPAIR;  Surgeon: Rohan Campos M.D.;  Location: SURGERY [facility];  Service:     SHOULDER ARTHROSCOPY W/ ROTATOR CUFF REPAIR      right    HYSTERECTOMY, TOTAL ABDOMINAL      OTHER ABDOMINAL SURGERY      perforated small intestine    PRIMARY C SECTION  ,,           Allergies: Gluten meal    Family History   Problem Relation Age of Onset    Heart Disease Mother         pci in 60s, cabg in 70s    Hypertension Mother     Hyperlipidemia Mother        Vitals:    22 1055   Height: 1.676 m (5' 6\")   Weight: 64.4 kg (142 lb)   Weight % change since last entry.: 0 %   BP: 112/80   Pulse: 82   BMI (Calculated): 22.92       Physical Examination  Physical Exam  Constitutional:       Appearance: Normal appearance.   HENT:      Mouth/Throat:      Mouth: Mucous membranes are moist.   Eyes:      Extraocular Movements: Extraocular movements intact.      Pupils: Pupils are equal, round, and reactive to light.   Cardiovascular:      Rate and Rhythm: Normal rate.   Pulmonary:      Effort: Pulmonary effort is normal. No respiratory distress.      Breath sounds: Normal breath sounds. No wheezing or rhonchi.   Chest:      Chest wall: No tenderness.   Musculoskeletal:         General: Normal range of motion.      Cervical back: Normal range of motion.   Skin:     General: Skin is warm and dry.   Neurological:      General: No focal deficit present.      Mental Status: She is alert and oriented to person, place, and time.   Psychiatric:         Mood and Affect: Mood normal.         Behavior: Behavior normal.         Thought " Content: Thought content normal.         Judgment: Judgment normal.         Results:    Pulmonary function tests    Other pertinent testing:        Juan Lucero M.D., MD [facility]  [facility]

## 2022-11-16 NOTE — ASSESSMENT & PLAN NOTE
This was seen in August 2022.  Patient is completely asymptomatic.  Unclear if this is still present.  Do not see indication of CT scan at this time however we do need a chest x-ray to assess that the pleural effusion is resolved.  Plan is for chest x-ray if abnormal then will pursue CT scan.    Reviewed with patient patient is completely asymptomatic.  Pt agrees with plan

## 2022-12-01 DIAGNOSIS — E78.5 DYSLIPIDEMIA: ICD-10-CM

## 2022-12-01 RX ORDER — ROSUVASTATIN CALCIUM 5 MG/1
TABLET, COATED ORAL
Qty: 90 TABLET | Refills: 1 | Status: SHIPPED | OUTPATIENT
Start: 2022-12-01 | End: 2023-08-31 | Stop reason: SDUPTHER

## 2022-12-05 ENCOUNTER — APPOINTMENT (OUTPATIENT)
Dept: RADIOLOGY | Facility: MEDICAL CENTER | Age: 72
End: 2022-12-05
Attending: FAMILY MEDICINE
Payer: MEDICARE

## 2023-01-04 DIAGNOSIS — E78.5 DYSLIPIDEMIA: ICD-10-CM

## 2023-01-04 RX ORDER — EZETIMIBE 10 MG/1
10 TABLET ORAL DAILY
Qty: 90 TABLET | Refills: 3 | Status: SHIPPED | OUTPATIENT
Start: 2023-01-04 | End: 2023-12-28 | Stop reason: SDUPTHER

## 2023-01-17 ENCOUNTER — APPOINTMENT (RX ONLY)
Dept: URBAN - METROPOLITAN AREA CLINIC 35 | Facility: CLINIC | Age: 73
Setting detail: DERMATOLOGY
End: 2023-01-17

## 2023-01-17 DIAGNOSIS — L81.4 OTHER MELANIN HYPERPIGMENTATION: ICD-10-CM

## 2023-01-17 DIAGNOSIS — L82.1 OTHER SEBORRHEIC KERATOSIS: ICD-10-CM

## 2023-01-17 DIAGNOSIS — D17 BENIGN LIPOMATOUS NEOPLASM: ICD-10-CM

## 2023-01-17 DIAGNOSIS — D18.0 HEMANGIOMA: ICD-10-CM

## 2023-01-17 DIAGNOSIS — L71.8 OTHER ROSACEA: ICD-10-CM

## 2023-01-17 DIAGNOSIS — L91.8 OTHER HYPERTROPHIC DISORDERS OF THE SKIN: ICD-10-CM

## 2023-01-17 DIAGNOSIS — Z71.89 OTHER SPECIFIED COUNSELING: ICD-10-CM

## 2023-01-17 DIAGNOSIS — Z87.2 PERSONAL HISTORY OF DISEASES OF THE SKIN AND SUBCUTANEOUS TISSUE: ICD-10-CM

## 2023-01-17 DIAGNOSIS — L72.8 OTHER FOLLICULAR CYSTS OF THE SKIN AND SUBCUTANEOUS TISSUE: ICD-10-CM

## 2023-01-17 DIAGNOSIS — D22 MELANOCYTIC NEVI: ICD-10-CM

## 2023-01-17 DIAGNOSIS — Z85.828 PERSONAL HISTORY OF OTHER MALIGNANT NEOPLASM OF SKIN: ICD-10-CM

## 2023-01-17 PROBLEM — D17.21 BENIGN LIPOMATOUS NEOPLASM OF SKIN AND SUBCUTANEOUS TISSUE OF RIGHT ARM: Status: ACTIVE | Noted: 2023-01-17

## 2023-01-17 PROBLEM — D17.1 BENIGN LIPOMATOUS NEOPLASM OF SKIN AND SUBCUTANEOUS TISSUE OF TRUNK: Status: ACTIVE | Noted: 2023-01-17

## 2023-01-17 PROBLEM — D17.24 BENIGN LIPOMATOUS NEOPLASM OF SKIN AND SUBCUTANEOUS TISSUE OF LEFT LEG: Status: ACTIVE | Noted: 2023-01-17

## 2023-01-17 PROBLEM — D23.5 OTHER BENIGN NEOPLASM OF SKIN OF TRUNK: Status: ACTIVE | Noted: 2023-01-17

## 2023-01-17 PROBLEM — D18.01 HEMANGIOMA OF SKIN AND SUBCUTANEOUS TISSUE: Status: ACTIVE | Noted: 2023-01-17

## 2023-01-17 PROBLEM — D22.61 MELANOCYTIC NEVI OF RIGHT UPPER LIMB, INCLUDING SHOULDER: Status: ACTIVE | Noted: 2023-01-17

## 2023-01-17 PROBLEM — D17.22 BENIGN LIPOMATOUS NEOPLASM OF SKIN AND SUBCUTANEOUS TISSUE OF LEFT ARM: Status: ACTIVE | Noted: 2023-01-17

## 2023-01-17 PROBLEM — D17.23 BENIGN LIPOMATOUS NEOPLASM OF SKIN AND SUBCUTANEOUS TISSUE OF RIGHT LEG: Status: ACTIVE | Noted: 2023-01-17

## 2023-01-17 PROCEDURE — ? BENIGN DESTRUCTION

## 2023-01-17 PROCEDURE — 99213 OFFICE O/P EST LOW 20 MIN: CPT | Mod: 25

## 2023-01-17 PROCEDURE — 17110 DESTRUCTION B9 LES UP TO 14: CPT | Mod: 52

## 2023-01-17 PROCEDURE — ? SUNSCREEN RECOMMENDATIONS

## 2023-01-17 PROCEDURE — ? LIQUID NITROGEN

## 2023-01-17 PROCEDURE — ? COUNSELING

## 2023-01-17 PROCEDURE — ? TREATMENT REGIMEN

## 2023-01-17 PROCEDURE — ? ADDITIONAL NOTES

## 2023-01-17 ASSESSMENT — LOCATION DETAILED DESCRIPTION DERM
LOCATION DETAILED: LEFT PROXIMAL PRETIBIAL REGION
LOCATION DETAILED: LEFT ANTERIOR PROXIMAL THIGH
LOCATION DETAILED: RIGHT ANTERIOR DISTAL THIGH
LOCATION DETAILED: LEFT INFERIOR UPPER BACK
LOCATION DETAILED: LEFT PROXIMAL DORSAL FOREARM
LOCATION DETAILED: LEFT INFERIOR LATERAL MALAR CHEEK
LOCATION DETAILED: LEFT LATERAL SUPERIOR CHEST
LOCATION DETAILED: LEFT POSTERIOR SHOULDER
LOCATION DETAILED: RIGHT POSTERIOR SHOULDER
LOCATION DETAILED: LEFT MID-UPPER BACK
LOCATION DETAILED: LEFT INFERIOR LID MARGIN
LOCATION DETAILED: LEFT RIB CAGE
LOCATION DETAILED: RIGHT MEDIAL SUPERIOR CHEST
LOCATION DETAILED: RIGHT CENTRAL TEMPLE
LOCATION DETAILED: RIGHT PROXIMAL DORSAL FOREARM

## 2023-01-17 ASSESSMENT — LOCATION SIMPLE DESCRIPTION DERM
LOCATION SIMPLE: LEFT FOREARM
LOCATION SIMPLE: RIGHT THIGH
LOCATION SIMPLE: RIGHT TEMPLE
LOCATION SIMPLE: ABDOMEN
LOCATION SIMPLE: CHEST
LOCATION SIMPLE: LEFT INFERIOR EYELID
LOCATION SIMPLE: LEFT UPPER BACK
LOCATION SIMPLE: LEFT PRETIBIAL REGION
LOCATION SIMPLE: LEFT THIGH
LOCATION SIMPLE: RIGHT SHOULDER
LOCATION SIMPLE: LEFT CHEEK
LOCATION SIMPLE: RIGHT FOREARM
LOCATION SIMPLE: LEFT SHOULDER

## 2023-01-17 ASSESSMENT — LOCATION ZONE DERM
LOCATION ZONE: EYELID
LOCATION ZONE: ARM
LOCATION ZONE: TRUNK
LOCATION ZONE: FACE
LOCATION ZONE: LEG

## 2023-01-17 NOTE — PROCEDURE: TREATMENT REGIMEN
Plan: Discussed [facility], quoted $200 on nose only or $400 to treat face\\nPatient considering Rhofade
Detail Level: Detailed
Discontinue Regimen: - metronidazole 0.75 % lotion Apply. Redness got worse with metro

## 2023-01-17 NOTE — PROCEDURE: LIQUID NITROGEN
Show Applicator Variable?: Yes
Number Of Freeze-Thaw Cycles: 2 freeze-thaw cycles
Detail Level: Detailed
Spray Paint Text: The liquid nitrogen was applied to the skin utilizing a spray paint frosting technique.
Medical Necessity Information: It is in your best interest to select a reason for this procedure from the list below. All of these items fulfill various CMS LCD requirements except the new and changing color options.
Consent: The patient's consent was obtained including but not limited to risks of crusting, scabbing, blistering, scarring, darker or lighter pigmentary change, recurrence, incomplete removal and infection.
Post-Care Instructions: I reviewed with the patient in detail post-care instructions. Patient is to wear sunprotection, and avoid picking at any of the treated lesions. Pt may apply Vaseline to crusted or scabbing areas.
Medical Necessity Clause: This procedure was medically necessary because the lesions that were treated were:
Spray Paint Technique: No

## 2023-01-17 NOTE — PROCEDURE: ADDITIONAL NOTES
Additional Notes: Treated with liquid nitrogen via forcep
Render Risk Assessment In Note?: yes
Detail Level: Simple

## 2023-01-18 ENCOUNTER — HOSPITAL ENCOUNTER (OUTPATIENT)
Dept: RADIOLOGY | Facility: MEDICAL CENTER | Age: 73
End: 2023-01-18
Attending: FAMILY MEDICINE
Payer: MEDICARE

## 2023-01-18 DIAGNOSIS — Z12.31 VISIT FOR SCREENING MAMMOGRAM: ICD-10-CM

## 2023-01-18 PROCEDURE — 77063 BREAST TOMOSYNTHESIS BI: CPT

## 2023-02-02 ENCOUNTER — HOSPITAL ENCOUNTER (OUTPATIENT)
Dept: LAB | Facility: MEDICAL CENTER | Age: 73
End: 2023-02-02
Attending: FAMILY MEDICINE
Payer: MEDICARE

## 2023-02-02 LAB
25(OH)D3 SERPL-MCNC: 47 NG/ML (ref 30–100)
ALBUMIN SERPL BCP-MCNC: 4 G/DL (ref 3.2–4.9)
ALBUMIN/GLOB SERPL: 1.4 G/DL
ALP SERPL-CCNC: 92 U/L (ref 30–99)
ALT SERPL-CCNC: 19 U/L (ref 2–50)
ANION GAP SERPL CALC-SCNC: 10 MMOL/L (ref 7–16)
AST SERPL-CCNC: 20 U/L (ref 12–45)
BASOPHILS # BLD AUTO: 1 % (ref 0–1.8)
BASOPHILS # BLD: 0.06 K/UL (ref 0–0.12)
BILIRUB SERPL-MCNC: 0.6 MG/DL (ref 0.1–1.5)
BUN SERPL-MCNC: 14 MG/DL (ref 8–22)
CALCIUM ALBUM COR SERPL-MCNC: 9.3 MG/DL (ref 8.5–10.5)
CALCIUM SERPL-MCNC: 9.3 MG/DL (ref 8.5–10.5)
CHLORIDE SERPL-SCNC: 108 MMOL/L (ref 96–112)
CHOLEST SERPL-MCNC: 139 MG/DL (ref 100–199)
CO2 SERPL-SCNC: 24 MMOL/L (ref 20–33)
CREAT SERPL-MCNC: 0.64 MG/DL (ref 0.5–1.4)
EOSINOPHIL # BLD AUTO: 0.1 K/UL (ref 0–0.51)
EOSINOPHIL NFR BLD: 1.7 % (ref 0–6.9)
ERYTHROCYTE [DISTWIDTH] IN BLOOD BY AUTOMATED COUNT: 45.8 FL (ref 35.9–50)
GFR SERPLBLD CREATININE-BSD FMLA CKD-EPI: 92 ML/MIN/1.73 M 2
GLOBULIN SER CALC-MCNC: 2.8 G/DL (ref 1.9–3.5)
GLUCOSE SERPL-MCNC: 84 MG/DL (ref 65–99)
HCT VFR BLD AUTO: 44.5 % (ref 37–47)
HDLC SERPL-MCNC: 52 MG/DL
HGB BLD-MCNC: 14.6 G/DL (ref 12–16)
IMM GRANULOCYTES # BLD AUTO: 0.01 K/UL (ref 0–0.11)
IMM GRANULOCYTES NFR BLD AUTO: 0.2 % (ref 0–0.9)
LDLC SERPL CALC-MCNC: 64 MG/DL
LYMPHOCYTES # BLD AUTO: 1.89 K/UL (ref 1–4.8)
LYMPHOCYTES NFR BLD: 31.6 % (ref 22–41)
MCH RBC QN AUTO: 31.2 PG (ref 27–33)
MCHC RBC AUTO-ENTMCNC: 32.8 G/DL (ref 33.6–35)
MCV RBC AUTO: 95.1 FL (ref 81.4–97.8)
MONOCYTES # BLD AUTO: 0.5 K/UL (ref 0–0.85)
MONOCYTES NFR BLD AUTO: 8.4 % (ref 0–13.4)
NEUTROPHILS # BLD AUTO: 3.42 K/UL (ref 2–7.15)
NEUTROPHILS NFR BLD: 57.1 % (ref 44–72)
NRBC # BLD AUTO: 0 K/UL
NRBC BLD-RTO: 0 /100 WBC
PLATELET # BLD AUTO: 289 K/UL (ref 164–446)
PMV BLD AUTO: 10.7 FL (ref 9–12.9)
POTASSIUM SERPL-SCNC: 3.9 MMOL/L (ref 3.6–5.5)
PROT SERPL-MCNC: 6.8 G/DL (ref 6–8.2)
RBC # BLD AUTO: 4.68 M/UL (ref 4.2–5.4)
SODIUM SERPL-SCNC: 142 MMOL/L (ref 135–145)
TRIGL SERPL-MCNC: 114 MG/DL (ref 0–149)
TSH SERPL DL<=0.005 MIU/L-ACNC: 1.84 UIU/ML (ref 0.38–5.33)
WBC # BLD AUTO: 6 K/UL (ref 4.8–10.8)

## 2023-02-02 PROCEDURE — 80053 COMPREHEN METABOLIC PANEL: CPT

## 2023-02-02 PROCEDURE — 85025 COMPLETE CBC W/AUTO DIFF WBC: CPT

## 2023-02-02 PROCEDURE — 80061 LIPID PANEL: CPT

## 2023-02-02 PROCEDURE — 36415 COLL VENOUS BLD VENIPUNCTURE: CPT

## 2023-02-02 PROCEDURE — 82306 VITAMIN D 25 HYDROXY: CPT

## 2023-02-02 PROCEDURE — 84443 ASSAY THYROID STIM HORMONE: CPT

## 2023-02-17 PROCEDURE — RXMED WILLOW AMBULATORY MEDICATION CHARGE: Performed by: NURSE PRACTITIONER

## 2023-02-17 PROCEDURE — RXMED WILLOW AMBULATORY MEDICATION CHARGE: Performed by: FAMILY MEDICINE

## 2023-02-20 ENCOUNTER — HOSPITAL ENCOUNTER (OUTPATIENT)
Dept: RADIOLOGY | Facility: MEDICAL CENTER | Age: 73
End: 2023-02-20
Attending: FAMILY MEDICINE
Payer: MEDICARE

## 2023-02-20 DIAGNOSIS — D38.1 NEOPLASM OF UNCERTAIN BEHAVIOR OF TRACHEA, BRONCHUS, AND LUNG: ICD-10-CM

## 2023-02-20 PROCEDURE — 71260 CT THORAX DX C+: CPT

## 2023-02-20 PROCEDURE — 700117 HCHG RX CONTRAST REV CODE 255: Performed by: FAMILY MEDICINE

## 2023-02-20 RX ADMIN — IOHEXOL 75 ML: 350 INJECTION, SOLUTION INTRAVENOUS at 11:02

## 2023-02-22 ENCOUNTER — PHARMACY VISIT (OUTPATIENT)
Dept: PHARMACY | Facility: MEDICAL CENTER | Age: 73
End: 2023-02-22
Payer: COMMERCIAL

## 2023-02-28 DIAGNOSIS — I65.23 CAROTID ATHEROSCLEROSIS, BILATERAL: ICD-10-CM

## 2023-03-01 NOTE — PROGRESS NOTES
Discussed surveillance imaging recommendations for pt with medical director.  Recommend canceling Imaging order for carotid US at this time.   Will discuss with pt if she wishes to have total vascular screening with [facility] at her next appt.    Bernice CABRERA  [facility]

## 2023-03-24 NOTE — ADDENDUM NOTE
Addended by: TORSTEN CARMICHAEL on: 2/28/2023 04:59 PM     Modules accepted: Orders     Name: Laurian Spurling      : 1958      MRN: [id number]  Encounter Provider: Daphne Sanchez MD  Encounter Date: 3/24/2023   Encounter department: [address] [place] [address]     1  Type 2 diabetes mellitus with other specified complication, unspecified whether long term insulin use (HCC)  Assessment & Plan:  HgbA1C trending down  Continue metformin 9882PH BID and trulicity injection every 7 days  Encouraged patient to eat a low fat diet  Discussed the importance of cutting down on snacks and foods involving bread  Advised following with ophthalmology for blurry vision  Lab Results   Component Value Date    HGBA1C 8 2 (A) 2023       Orders:  -     POCT hemoglobin A1c    2  Pure hypercholesterolemia  Assessment & Plan:  Encouraged eating low fat and low sugar diet, incorporating more vegetables and cutting down on high fat, fried, and high carb foods  Subjective      LM is a 74 yo male with PMH of asthma, Type 2 DM, and JESSICA presenting to the office today for DM follow up  Patient reports taking trulicity injection and metformin po QD for diabetes management  He reports no side effects from them  He denies any polydipsia, polyuria, fevers, chills, chest pain, SOB, abdominal pain, N/V, headaches, dizziness, or syncope  He denies any hypo/hyperglycemia symptoms  He states he suffers from neuropathy  He reports having blurry vision and has not been able to follow up with ophthalmology  He denies eating vegetables, and diet usually includes meat and cheese sandwich, diet pepsi  He monitors his blood sugars at home and is noted around 170  He smokes about 5 cigarettes a day for many years  Diabetes  Pertinent negatives for hypoglycemia include no dizziness or headaches  Pertinent negatives for diabetes include no chest pain, no fatigue, no polydipsia, no polyphagia and no polyuria       Review of Systems   Constitutional: Negative for fatigue and fever    Eyes: Positive for visual disturbance  Respiratory: Negative for shortness of breath  Cardiovascular: Negative for chest pain and leg swelling  Gastrointestinal: Negative for abdominal pain  Endocrine: Negative for polydipsia, polyphagia and polyuria  Genitourinary: Negative for difficulty urinating  Neurological: Negative for dizziness, syncope and headaches         Current Outpatient Medications on File Prior to Visit   Medication Sig   • Alcohol Swabs (Alcohol Pads) 70 % PADS Use daily in the early morning   • aspirin (Aspirin Low Dose) 81 mg EC tablet Take 1 tablet (81 mg total) by mouth daily   • atorvastatin (LIPITOR) 10 mg tablet Take 1 tablet (10 mg total) by mouth daily   • bisacodyl (DULCOLAX) 5 mg EC tablet Take as instructed by GI office for bowel prep for colonoscopy   • cholecalciferol (VITAMIN D3) 1,000 units tablet Take 1 tablet (1,000 Units total) by mouth daily   • Continuous Blood Gluc Sensor (FreeStyle Angella 14 Day Sensor) MISC Use 1 Device continuous   • Diclofenac Sodium (VOLTAREN) 1 % Apply 2 g topically 4 (four) times a day (Patient not taking: Reported on 1/18/2023)   • gabapentin (NEURONTIN) 100 mg capsule take 1 capsule by mouth three times a day   • gabapentin (NEURONTIN) 800 mg tablet Take 1 tablet (800 mg total) by mouth 3 (three) times a day   • glucose blood (FREESTYLE LITE) test strip Check blood sugar 3 times a day   • glucose monitoring kit (FREESTYLE) monitoring kit Use 1 each daily in the early morning   • insulin detemir (Levemir FlexTouch) 100 Units/mL injection pen Inject 15 Units under the skin daily at bedtime   • Insulin Pen Needle 32G X 4 MM MISC Use daily at bedtime   • Lancets (freestyle) lancets Use as instructed   • metFORMIN (GLUCOPHAGE) 1000 MG tablet Take 1 tablet (1,000 mg total) by mouth 2 (two) times a day with meals   • pantoprazole (PROTONIX) 40 mg tablet take 1 tablet by mouth twice a day (Patient not taking: Reported on 1/18/2023)   • polyethylene glycol (GOLYTELY) 4000 mL solution Take as instructed by GI office for bowel prep   • saccharomyces boulardii (FLORASTOR) 250 mg capsule Take 1 capsule (250 mg total) by mouth 2 (two) times a day (Patient not taking: Reported on 1/18/2023)   • sildenafil (VIAGRA) 25 MG tablet Take 1 tablet (25 mg total) by mouth daily as needed for erectile dysfunction (Patient not taking: Reported on 4/27/2022)   • sucralfate (CARAFATE) 1 g tablet take 1 tablet by mouth four times a day   • traZODone (DESYREL) 50 mg tablet Take 1 tablet (50 mg total) by mouth daily at bedtime   • Trulicity 5 35 HH/4 4KT injection inject 0 5 milliliters ( 0 75 milligrams ) subcutaneously every 7 days IN THE ABDOMEN THIGHS OR OUTER AREA OF UPPER ARM ROTATE INJECTION SITES   • [DISCONTINUED] pantoprazole (PROTONIX) 40 mg tablet take 1 tablet by mouth every 12 hours for 14 days (Patient not taking: Reported on 1/18/2023)       Objective     /78   Pulse 91   Temp 97 8 °F (36 6 °C) (Temporal)   Resp 19   Wt 59 9 kg (132 lb)   SpO2 97%   BMI 25 78 kg/m²     Physical Exam  Vitals reviewed  Constitutional:       General: He is not in acute distress  Appearance: Normal appearance  He is normal weight  He is not toxic-appearing  HENT:      Head: Normocephalic and atraumatic  Right Ear: Tympanic membrane, ear canal and external ear normal       Left Ear: Tympanic membrane, ear canal and external ear normal       Nose: No congestion  Mouth/Throat:      Mouth: Mucous membranes are moist       Pharynx: No oropharyngeal exudate or posterior oropharyngeal erythema  Eyes:      Pupils: Pupils are equal, round, and reactive to light  Cardiovascular:      Rate and Rhythm: Normal rate and regular rhythm  Pulses: Normal pulses  Dorsalis pedis pulses are 2+ on the right side and 2+ on the left side  Posterior tibial pulses are 2+ on the right side and 2+ on the left side        Heart sounds: Normal heart sounds  No murmur heard  No friction rub  No gallop  Pulmonary:      Effort: Pulmonary effort is normal  No respiratory distress  Breath sounds: Normal breath sounds  No wheezing, rhonchi or rales  Musculoskeletal:      Right foot: Normal range of motion  No deformity or bunion  Left foot: Normal range of motion  No deformity or bunion  Feet:      Right foot:      Protective Sensation: 4 sites tested  4 sites sensed  Skin integrity: Skin integrity normal  No ulcer, blister or skin breakdown  Toenail Condition: Right toenails are abnormally thick  Left foot:      Protective Sensation: 4 sites tested  4 sites sensed  Skin integrity: Skin integrity normal  No ulcer, blister or skin breakdown  Toenail Condition: Left toenails are abnormally thick  Neurological:      Mental Status: He is alert         Ramses Cohen MD

## 2023-03-27 ENCOUNTER — HOSPITAL ENCOUNTER (OUTPATIENT)
Dept: LAB | Facility: MEDICAL CENTER | Age: 73
End: 2023-03-27
Attending: NURSE PRACTITIONER
Payer: MEDICARE

## 2023-03-27 DIAGNOSIS — Z79.02 LONG TERM (CURRENT) USE OF ANTITHROMBOTICS/ANTIPLATELETS: ICD-10-CM

## 2023-03-27 DIAGNOSIS — I65.23 CAROTID ATHEROSCLEROSIS, BILATERAL: ICD-10-CM

## 2023-03-27 DIAGNOSIS — E78.5 DYSLIPIDEMIA: ICD-10-CM

## 2023-03-27 LAB
ALBUMIN SERPL BCP-MCNC: 3.9 G/DL (ref 3.2–4.9)
ALBUMIN/GLOB SERPL: 1.4 G/DL
ALP SERPL-CCNC: 90 U/L (ref 30–99)
ALT SERPL-CCNC: 16 U/L (ref 2–50)
ANION GAP SERPL CALC-SCNC: 11 MMOL/L (ref 7–16)
AST SERPL-CCNC: 19 U/L (ref 12–45)
BASOPHILS # BLD AUTO: 0.9 % (ref 0–1.8)
BASOPHILS # BLD: 0.06 K/UL (ref 0–0.12)
BILIRUB SERPL-MCNC: 0.8 MG/DL (ref 0.1–1.5)
BUN SERPL-MCNC: 15 MG/DL (ref 8–22)
CALCIUM ALBUM COR SERPL-MCNC: 9.2 MG/DL (ref 8.5–10.5)
CALCIUM SERPL-MCNC: 9.1 MG/DL (ref 8.5–10.5)
CHLORIDE SERPL-SCNC: 108 MMOL/L (ref 96–112)
CHOLEST SERPL-MCNC: 129 MG/DL (ref 100–199)
CO2 SERPL-SCNC: 22 MMOL/L (ref 20–33)
CREAT SERPL-MCNC: 0.69 MG/DL (ref 0.5–1.4)
EOSINOPHIL # BLD AUTO: 0.14 K/UL (ref 0–0.51)
EOSINOPHIL NFR BLD: 2 % (ref 0–6.9)
ERYTHROCYTE [DISTWIDTH] IN BLOOD BY AUTOMATED COUNT: 46.8 FL (ref 35.9–50)
FASTING STATUS PATIENT QL REPORTED: NORMAL
GFR SERPLBLD CREATININE-BSD FMLA CKD-EPI: 91 ML/MIN/1.73 M 2
GLOBULIN SER CALC-MCNC: 2.7 G/DL (ref 1.9–3.5)
GLUCOSE SERPL-MCNC: 90 MG/DL (ref 65–99)
HCT VFR BLD AUTO: 43.5 % (ref 37–47)
HDLC SERPL-MCNC: 55 MG/DL
HGB BLD-MCNC: 14.7 G/DL (ref 12–16)
IMM GRANULOCYTES # BLD AUTO: 0.02 K/UL (ref 0–0.11)
IMM GRANULOCYTES NFR BLD AUTO: 0.3 % (ref 0–0.9)
LDLC SERPL CALC-MCNC: 58 MG/DL
LYMPHOCYTES # BLD AUTO: 2.05 K/UL (ref 1–4.8)
LYMPHOCYTES NFR BLD: 29.5 % (ref 22–41)
MCH RBC QN AUTO: 31.5 PG (ref 27–33)
MCHC RBC AUTO-ENTMCNC: 33.8 G/DL (ref 33.6–35)
MCV RBC AUTO: 93.1 FL (ref 81.4–97.8)
MONOCYTES # BLD AUTO: 0.51 K/UL (ref 0–0.85)
MONOCYTES NFR BLD AUTO: 7.3 % (ref 0–13.4)
NEUTROPHILS # BLD AUTO: 4.17 K/UL (ref 2–7.15)
NEUTROPHILS NFR BLD: 60 % (ref 44–72)
NRBC # BLD AUTO: 0 K/UL
NRBC BLD-RTO: 0 /100 WBC
PLATELET # BLD AUTO: 270 K/UL (ref 164–446)
PMV BLD AUTO: 10.9 FL (ref 9–12.9)
POTASSIUM SERPL-SCNC: 4 MMOL/L (ref 3.6–5.5)
PROT SERPL-MCNC: 6.6 G/DL (ref 6–8.2)
RBC # BLD AUTO: 4.67 M/UL (ref 4.2–5.4)
SODIUM SERPL-SCNC: 141 MMOL/L (ref 135–145)
TRIGL SERPL-MCNC: 80 MG/DL (ref 0–149)
WBC # BLD AUTO: 7 K/UL (ref 4.8–10.8)

## 2023-03-27 PROCEDURE — 80061 LIPID PANEL: CPT

## 2023-03-27 PROCEDURE — 36415 COLL VENOUS BLD VENIPUNCTURE: CPT

## 2023-03-27 PROCEDURE — 80053 COMPREHEN METABOLIC PANEL: CPT

## 2023-03-27 PROCEDURE — 85025 COMPLETE CBC W/AUTO DIFF WBC: CPT

## 2023-04-03 ENCOUNTER — OFFICE VISIT (OUTPATIENT)
Dept: VASCULAR LAB | Facility: MEDICAL CENTER | Age: 73
End: 2023-04-03
Payer: MEDICARE

## 2023-04-03 VITALS
DIASTOLIC BLOOD PRESSURE: 72 MMHG | HEART RATE: 75 BPM | BODY MASS INDEX: 22.98 KG/M2 | SYSTOLIC BLOOD PRESSURE: 118 MMHG | WEIGHT: 143 LBS | HEIGHT: 66 IN

## 2023-04-03 DIAGNOSIS — I72.8 ANEURYSM OF SPLENIC ARTERY (HCC): ICD-10-CM

## 2023-04-03 DIAGNOSIS — I65.23 CAROTID ATHEROSCLEROSIS, BILATERAL: ICD-10-CM

## 2023-04-03 DIAGNOSIS — Z79.02 LONG TERM (CURRENT) USE OF ANTITHROMBOTICS/ANTIPLATELETS: ICD-10-CM

## 2023-04-03 DIAGNOSIS — E78.5 DYSLIPIDEMIA: ICD-10-CM

## 2023-04-03 PROCEDURE — 99214 OFFICE O/P EST MOD 30 MIN: CPT

## 2023-04-03 PROCEDURE — 99212 OFFICE O/P EST SF 10 MIN: CPT

## 2023-04-03 ASSESSMENT — ENCOUNTER SYMPTOMS
DIZZINESS: 0
BLURRED VISION: 0
HEMOPTYSIS: 0
DOUBLE VISION: 0
PALPITATIONS: 0
HEADACHES: 0
FOCAL WEAKNESS: 0
BLOOD IN STOOL: 0
ABDOMINAL PAIN: 0
TREMORS: 0
COUGH: 0
WHEEZING: 0
BRUISES/BLEEDS EASILY: 0
MYALGIAS: 0
SHORTNESS OF BREATH: 0
SEIZURES: 0
WEAKNESS: 0

## 2023-04-03 ASSESSMENT — FIBROSIS 4 INDEX: FIB4 SCORE: 1.3

## 2023-04-03 NOTE — PROGRESS NOTES
FOLLOW-UP VASCULAR VISIT  Subjective:   Isabel Peralta is a 72 y.o. female who presents today 2023 for   Chief Complaint   Patient presents with    Follow-Up         HPI:    Hyperlipidemia:    Stable, no current concerns  Current treatment: rosuva 5mg 4 times a week + Zetia daily  Myalgias? No  Other adverse drug reactions? No  Lipid profile: 3/2023 NonHDL-74, LDL-58      Carotid artery disease:   No h/o tia or cva symptoms including presyncope, syncope or HA.    Splenic a. Aneurysm - denies LUQ pain or dullness, stable on prior serial exams.    Antiplat: Taking ASA w/o bleeding issues.     Social History     Tobacco Use   Smoking Status Former    Packs/day: 0.50    Years: 4.00    Pack years: 2.00    Types: Cigarettes    Quit date: 1971    Years since quittin.2   Smokeless Tobacco Never   Tobacco Comments    quit 49 years ago     Social History     Tobacco Use    Smoking status: Former     Packs/day: 0.50     Years: 4.00     Pack years: 2.00     Types: Cigarettes     Quit date: 1971     Years since quittin.2    Smokeless tobacco: Never    Tobacco comments:     quit 49 years ago   Vaping Use    Vaping Use: Never used   Substance Use Topics    Alcohol use: Not Currently     Comment: on the holidays    Drug use: No     Outpatient Encounter Medications as of 4/3/2023   Medication Sig Dispense Refill    pantoprazole (PROTONIX) 40 MG Tablet Delayed Response Take 1 tablet by mouth once daily 90 Tablet 2    pantoprazole (PROTONIX) 40 MG Tablet Delayed Response Take 1 Tablet by mouth every day. 90 Tablet 4    ezetimibe (ZETIA) 10 MG Tab Take 1 tablet by mouth once daily 90 Tablet 3    rosuvastatin (CRESTOR) 5 MG Tab Take 1 tablet by mouth on Sundays, , , and  90 Tablet 1    Ascorbic Acid (VITAMIN C GUMMIE PO) Take 2 Tablets by mouth every evening.      Calcium-Phosphorus-Vitamin D (CALCIUM/D3 ADULT GUMMIES PO) Take 2 Tablets by mouth 2 times a day.      pantoprazole  "(PROTONIX) 40 MG Tablet Delayed Response Take 1 Tablet by mouth every day.      ibuprofen (MOTRIN) 200 MG TABS Take 1 Tablet by mouth every 6 hours as needed. Indications: Pain      estradiol (VAGIFEM) 10 MCG TABS Insert 1 Tablet into the vagina see administration instructions. Twice a week, on Sunday and Wed      aspirin 81 MG tablet Take 1 Tablet by mouth every day.      triamcinolone (NASACORT) 55 MCG/ACT nasal inhaler Administer 1 Spray into affected nostril(S) 2 times a day.       No facility-administered encounter medications on file as of 4/3/2023.     Allergies   Allergen Reactions    Gluten Meal      Belching, burping, bloating, \"stomach churning\", constipation, not able to sleep      DIET AND EXERCISE:  Weight Change: stable  BMI Readings from Last 4 Encounters:   04/03/23 23.08 kg/m²   01/30/23 23.24 kg/m²   11/16/22 22.92 kg/m²   08/31/22 22.77 kg/m²      Diet: gluten-free with high volume veg/fruits due to celiac dz   Exercise: moderate regular exercise program, doing PT for shoulder, stays active with house and yard work.     Review of Systems   Constitutional:  Negative for malaise/fatigue.   HENT:  Negative for nosebleeds.    Eyes:  Negative for blurred vision and double vision.   Respiratory:  Negative for cough, hemoptysis, shortness of breath and wheezing.    Cardiovascular:  Negative for chest pain, palpitations and leg swelling.   Gastrointestinal:  Negative for abdominal pain, blood in stool and melena.   Genitourinary:  Negative for hematuria.   Musculoskeletal:  Negative for joint pain and myalgias.   Neurological:  Negative for dizziness, tremors, focal weakness, seizures, weakness and headaches.   Endo/Heme/Allergies:  Does not bruise/bleed easily.      Objective:     Vitals:    04/03/23 1042   BP: 118/72   BP Location: Left arm   Patient Position: Sitting   BP Cuff Size: Adult   Pulse: 75   Weight: 64.9 kg (143 lb)   Height: 1.676 m (5' 6\")      BP Readings from Last 4 Encounters: "   04/03/23 118/72   11/16/22 112/80   08/31/22 (!) 154/78   08/31/22 128/76     Body mass index is 23.08 kg/m².  Physical Exam  Vitals reviewed.   Constitutional:       General: She is not in acute distress.     Appearance: Normal appearance. She is well-developed. She is not diaphoretic.   HENT:      Head: Normocephalic and atraumatic.   Cardiovascular:      Rate and Rhythm: Normal rate and regular rhythm.      Heart sounds: Normal heart sounds. No murmur heard.  Pulmonary:      Effort: Pulmonary effort is normal. No respiratory distress.      Breath sounds: Normal breath sounds. No wheezing.   Musculoskeletal:         General: No swelling. Normal range of motion.      Right lower leg: No edema.      Left lower leg: No edema.   Skin:     General: Skin is warm and dry.      Coloration: Skin is not pale.   Neurological:      General: No focal deficit present.      Mental Status: She is alert and oriented to person, place, and time.      Coordination: Coordination normal.      Gait: Gait normal.      Deep Tendon Reflexes: Reflexes are normal and symmetric.   Psychiatric:         Mood and Affect: Mood normal.         Behavior: Behavior normal.     Lab Results   Component Value Date    CHOLSTRLTOT 129 03/27/2023    LDL 58 03/27/2023    HDL 55 03/27/2023    TRIGLYCERIDE 80 03/27/2023           Lab Results   Component Value Date    SODIUM 141 03/27/2023    POTASSIUM 4.0 03/27/2023    CHLORIDE 108 03/27/2023    CO2 22 03/27/2023    GLUCOSE 90 03/27/2023    BUN 15 03/27/2023    CREATININE 0.69 03/27/2023    IFAFRICA >60 03/11/2022    IFNOTAFR >60 03/11/2022        Lab Results   Component Value Date    WBC 7.0 03/27/2023    RBC 4.67 03/27/2023    HEMOGLOBIN 14.7 03/27/2023    HEMATOCRIT 43.5 03/27/2023    MCV 93.1 03/27/2023    MCH 31.5 03/27/2023    MCHC 33.8 03/27/2023    MPV 10.9 03/27/2023      CTA abdomen aug 2016:  1.  There is no change in 2 small splenic artery aneurysms measuring 10-11 mm in diameter with minimal  peripheral thrombus and some peripheral calcification.  2.  There is mild atherosclerosis of the abdominal aorta.  3.  There is normal variant branching at the celiac axis with the common hepatic artery originating directly from the abdominal aorta at the level of the celiac axis.  4.  There may be a small hiatal hernia again seen    Stress MPI Jan 2017:  NUCLEAR IMAGING INTERPRETATION   No evidence of significant jeopardized viable myocardium or prior myocardial    infarction.   Normal left ventricular wall motion.  LV ejection fraction = 77%.   ECG INTERPRETATION   Negative stress ECG for ischemia.    Carotid duplex april 2017:  1.  There is a mild amount of atherosclerotic plaque.  Plaque is located in carotid bulbs and proximal internal and external carotid arteries.  Plaque characterization:  Calcific and irregular  2.  There is no evidence of carotid occlusion.  3. Vertebral arteries demonstrate antegrade flow.  4. Diameter reduction in the internal carotid arteries: less than 50%. There is no hemodynamically significant stenosis.    MRI head april 2017:  MRI of the brain without and with contrast within normal limits.    CTA abd/pelvis 4/8/19  1.  There is no interval change in 2 small splenic artery aneurysms each measuring 10-11 mm in diameter with minimal peripheral thrombus and calcification.  2.  There is mild atherosclerosis of the abdominal aorta.  3.  There are no suspicious parenchymal organ findings.  Variant anatomy with the common hepatic artery originating directly from the aorta is again noted. Celiac axis and SMA are patent. CYNTHIA is patent. Both renal arteries are patent  .  Total Vascular screening 5/22/20    1. Mild atherosclerosis as detailed above.   2. No significant stenosis seen.    CTA ABD 3/28/22    1.  2 partially calcified splenic artery aneurysms unchanged compared to previous.     2.  Mild/moderate calcified plaque abdominal aorta. No aneurysmal dilatation and no  dissection.    Medical Decision Making:  Today's Assessment / Status / Plan:     1. Carotid atherosclerosis, bilateral        2. Dyslipidemia  Lipid Profile    Comp Metabolic Panel      3. Splenic artery aneurysm (HCC)        4. Long term (current) use of antithrombotics/antiplatelets  CBC WITHOUT DIFFERENTIAL          Patient Type: Primary Prevention.    Etiology of Established CVD if Present:   1) Mild carotid atherosclerosis, asymptomatic, no previous intervention  2) Splenic artery aneurysm - stable over time    Lipid Management: Qualifies for Statin Therapy Based on 2013 ACC/AHA Guidelines: yes  Calculated 10-Year Risk of ASCVD:  10.4%  Currently on Statin:  Crestor 5mg 4x/week  On Zetia daily  Given subclinical atherosclerosis and FH would like to treat as high risk with goal LDL-C <70 and nonHDL <100.    Lp(a) normal.   - currently at goal:  Yes   Plan:  - continue zetia 10mg daily   - continue Crestor 5 mg  4x per week   - stop and call if any myalgias or abdominal symptoms  - continue gluten-free diet  - follow blood work q12mo    Blood Pressure Management: ACC/AHA (2017) goal <130/80  Home BP at goal: yes  Office BP at goal:  Yes  Echo: not done   ACR: note done   Plan:   Monitoring:   - continue home BP monitoring, reviewed correct technique:  Yes   - order 24h ABPM:  NO  Medications: no meds indicated at this time   -Monitor and record home BP and bring into next visit      Glycemic Status: Normal    Anti-Platelet/Anti-Coagulant Tx: yes  - continue low dose asa  -monitor for s/s bleeding    Smoking: continued complete avoidance of all tobacco products     Physical Activity: continue healthy activity to improve CV fitness    Weight Management and Nutrition: Dietary plan was discussed with patient at this visit including DASH, low sodium       Other:     1) Mild carotid atherosclerosis, asymptomatic, no previous intervention.  - Continue medical management.  Long discussion with pt regarding surveillance  and imaging, reviewed that insurance will likely not cover repeat imaging for carotid US at this time, discussed option of doing a total vascular screening with [facility] (last done 2020).  Through shared decision making, pt will hold off on repeat surveillance imaging at this time and will consider in future (possibly 2025) or sooner if develop symptoms.  Red flags reviewed.  - Repeat duplex in future, or total vasc screening with [facility] in another 2 years ( possible 2025) or with symptoms     2) Splenic artery aneurysm - stable over time.  No current symptoms  - Repeat CTA every 3 yrs (2025)    3) palpitations - 1x event; resolved.  Had visit with cards, no monitor.    Instructed to follow-up with PCP for remainder of adult medical needs: yes  We will partner with other providers in the management of established vascular disease and cardiometabolic risk factors.    Studies to Be Obtained: 1) CTA abdomen in 3 years (4/2025),   2) carotid duplex or total vasc screen with [facility]  (2025)    Labs to Be Obtained: cbc, cmp, lipid prior to next visit       Follow up in: 1yr  RICK Mena.      Cc:  ANJEL Bean MD

## 2023-06-07 ENCOUNTER — PHARMACY VISIT (OUTPATIENT)
Dept: PHARMACY | Facility: MEDICAL CENTER | Age: 73
End: 2023-06-07
Payer: COMMERCIAL

## 2023-06-07 PROCEDURE — RXMED WILLOW AMBULATORY MEDICATION CHARGE: Performed by: FAMILY MEDICINE

## 2023-06-21 ENCOUNTER — PHARMACY VISIT (OUTPATIENT)
Dept: PHARMACY | Facility: MEDICAL CENTER | Age: 73
End: 2023-06-21
Payer: COMMERCIAL

## 2023-06-21 PROCEDURE — RXMED WILLOW AMBULATORY MEDICATION CHARGE: Performed by: NURSE PRACTITIONER

## 2023-07-17 ENCOUNTER — APPOINTMENT (RX ONLY)
Dept: URBAN - METROPOLITAN AREA CLINIC 35 | Facility: CLINIC | Age: 73
Setting detail: DERMATOLOGY
End: 2023-07-17

## 2023-07-17 DIAGNOSIS — L72.8 OTHER FOLLICULAR CYSTS OF THE SKIN AND SUBCUTANEOUS TISSUE: ICD-10-CM

## 2023-07-17 DIAGNOSIS — D18.0 HEMANGIOMA: ICD-10-CM

## 2023-07-17 DIAGNOSIS — L71.8 OTHER ROSACEA: ICD-10-CM

## 2023-07-17 DIAGNOSIS — Z87.2 PERSONAL HISTORY OF DISEASES OF THE SKIN AND SUBCUTANEOUS TISSUE: ICD-10-CM

## 2023-07-17 DIAGNOSIS — L81.4 OTHER MELANIN HYPERPIGMENTATION: ICD-10-CM

## 2023-07-17 DIAGNOSIS — Z71.89 OTHER SPECIFIED COUNSELING: ICD-10-CM

## 2023-07-17 DIAGNOSIS — L82.1 OTHER SEBORRHEIC KERATOSIS: ICD-10-CM

## 2023-07-17 DIAGNOSIS — Z85.828 PERSONAL HISTORY OF OTHER MALIGNANT NEOPLASM OF SKIN: ICD-10-CM

## 2023-07-17 DIAGNOSIS — D17 BENIGN LIPOMATOUS NEOPLASM: ICD-10-CM

## 2023-07-17 DIAGNOSIS — D22 MELANOCYTIC NEVI: ICD-10-CM

## 2023-07-17 PROBLEM — D17.22 BENIGN LIPOMATOUS NEOPLASM OF SKIN AND SUBCUTANEOUS TISSUE OF LEFT ARM: Status: ACTIVE | Noted: 2023-07-17

## 2023-07-17 PROBLEM — D17.1 BENIGN LIPOMATOUS NEOPLASM OF SKIN AND SUBCUTANEOUS TISSUE OF TRUNK: Status: ACTIVE | Noted: 2023-07-17

## 2023-07-17 PROBLEM — D17.23 BENIGN LIPOMATOUS NEOPLASM OF SKIN AND SUBCUTANEOUS TISSUE OF RIGHT LEG: Status: ACTIVE | Noted: 2023-07-17

## 2023-07-17 PROBLEM — D18.01 HEMANGIOMA OF SKIN AND SUBCUTANEOUS TISSUE: Status: ACTIVE | Noted: 2023-07-17

## 2023-07-17 PROBLEM — D17.24 BENIGN LIPOMATOUS NEOPLASM OF SKIN AND SUBCUTANEOUS TISSUE OF LEFT LEG: Status: ACTIVE | Noted: 2023-07-17

## 2023-07-17 PROBLEM — D17.21 BENIGN LIPOMATOUS NEOPLASM OF SKIN AND SUBCUTANEOUS TISSUE OF RIGHT ARM: Status: ACTIVE | Noted: 2023-07-17

## 2023-07-17 PROBLEM — D22.61 MELANOCYTIC NEVI OF RIGHT UPPER LIMB, INCLUDING SHOULDER: Status: ACTIVE | Noted: 2023-07-17

## 2023-07-17 PROBLEM — D23.5 OTHER BENIGN NEOPLASM OF SKIN OF TRUNK: Status: ACTIVE | Noted: 2023-07-17

## 2023-07-17 PROCEDURE — 99213 OFFICE O/P EST LOW 20 MIN: CPT

## 2023-07-17 PROCEDURE — ? TREATMENT REGIMEN

## 2023-07-17 PROCEDURE — ? SUNSCREEN RECOMMENDATIONS

## 2023-07-17 PROCEDURE — ? COUNSELING

## 2023-07-17 PROCEDURE — ? BENIGN DESTRUCTION

## 2023-07-17 ASSESSMENT — LOCATION DETAILED DESCRIPTION DERM
LOCATION DETAILED: RIGHT POSTERIOR SHOULDER
LOCATION DETAILED: RIGHT PROXIMAL DORSAL FOREARM
LOCATION DETAILED: RIGHT ANTERIOR DISTAL THIGH
LOCATION DETAILED: LEFT POSTERIOR SHOULDER
LOCATION DETAILED: LEFT PROXIMAL PRETIBIAL REGION
LOCATION DETAILED: LEFT RIB CAGE
LOCATION DETAILED: RIGHT CENTRAL TEMPLE
LOCATION DETAILED: LEFT INFERIOR UPPER BACK
LOCATION DETAILED: RIGHT MEDIAL SUPERIOR CHEST
LOCATION DETAILED: LEFT PROXIMAL DORSAL FOREARM
LOCATION DETAILED: LEFT LATERAL SUPERIOR CHEST
LOCATION DETAILED: LEFT INFERIOR LATERAL MALAR CHEEK
LOCATION DETAILED: LEFT MID-UPPER BACK
LOCATION DETAILED: LEFT ANTERIOR PROXIMAL THIGH

## 2023-07-17 ASSESSMENT — LOCATION SIMPLE DESCRIPTION DERM
LOCATION SIMPLE: LEFT PRETIBIAL REGION
LOCATION SIMPLE: ABDOMEN
LOCATION SIMPLE: LEFT FOREARM
LOCATION SIMPLE: LEFT UPPER BACK
LOCATION SIMPLE: LEFT CHEEK
LOCATION SIMPLE: LEFT SHOULDER
LOCATION SIMPLE: RIGHT THIGH
LOCATION SIMPLE: RIGHT TEMPLE
LOCATION SIMPLE: RIGHT FOREARM
LOCATION SIMPLE: LEFT THIGH
LOCATION SIMPLE: CHEST
LOCATION SIMPLE: RIGHT SHOULDER

## 2023-07-17 ASSESSMENT — LOCATION ZONE DERM
LOCATION ZONE: FACE
LOCATION ZONE: TRUNK
LOCATION ZONE: ARM
LOCATION ZONE: LEG

## 2023-07-17 NOTE — PROCEDURE: BENIGN DESTRUCTION
Add [id number] Modifier (Optional): no
Consent: The patient's consent was obtained including but not limited to risks of crusting, scabbing, blistering, scarring, darker or lighter pigmentary change, recurrence, incomplete removal and infection.
Post-Care Instructions: I reviewed with the patient in detail post-care instructions. Patient is to wear sunprotection, and avoid picking at any of the treated lesions. Pt may apply Vaseline to crusted or scabbing areas.
Detail Level: Detailed
Medical Necessity Information: It is in your best interest to select a reason for this procedure from the list below. All of these items fulfill various CMS LCD requirements except the new and changing color options.
Include Z78.9 (Other Specified Conditions Influencing Health Status) As An Associated Diagnosis?: Yes
Medical Necessity Clause: This procedure was medically necessary because the lesions that were treated were:

## 2023-08-15 NOTE — PROCEDURE: BENIGN DESTRUCTION
Occupational Therapy  Evaluation    Name: Esdras Souza  MRN: [id number]  Admitting Diagnosis: MCFP: L acetabular fx, pubic ramus fx, 6th rib fx, L radial shaft fx s/p ORIF, pulmonary contusion with small ptx, L olecranon fx s/p ORIF   Recent Surgery: Procedure(s) (LRB):  ORIF, FOREARM (Left) 2 Days Post-Op    Recommendations:     Discharge Recommendations: home with home health, outpatient OT  Discharge Equipment Recommendations:   (TBD)  Barriers to discharge:  Other (Comment) (Severity of pain)    Assessment:     Esdras Souza is a 19 y.o. male with a medical diagnosis of MCFP: L acetabular fx, pubic ramus fx, 6th rib fx, L radial shaft fx s/p ORIF, pulmonary contusion with small ptx, L olecranon fx s/p ORIF. Performance deficits affecting function: impaired functional mobility, impaired endurance, pain, impaired fine motor, impaired balance, decreased upper extremity function, impaired self care skills, decreased ROM, edema, orthopedic precautions.  Pt yelling in pain upon OT entry. RN notified and in room to administer pain medicine prior to mobility. Pt refused to allow OT to assess LUE ROM 2/2 pain and edema. Pt educated on ROM exercises and positioning in order to reduce stiffness and swelling. Pt would benefit from OP OT once cleared by MD.     Rehab Prognosis: Good; patient would benefit from acute skilled OT services to address these deficits and reach maximum level of function.       Plan:     Patient to be seen 5 x/week to address the above listed problems via self-care/home management, therapeutic activities, therapeutic exercises  Plan of Care Expires: 08/29/23  Plan of Care Reviewed with: patient, sibling    Subjective     Chief Complaint: Pain in LUE  Patient/Family Comments/goals: To go home     Occupational Profile:  Living Environment: Pt lives in a SLH c 5 steps and a L rail to enter c his mom and brother. Reported he has a tub/shower combo.   Previous level of function: IND c ADLs and mobility. 
  Roles and Routines: Pt is a   Equipment Used at Home: none  Assistance upon Discharge: Pt reported his mom and brother will be able to assist at d/c.     Pain/Comfort:  Pain Rating 1: 10/10  Location - Side 1: Left  Location 1: arm  Pain Addressed 1: Reposition, Nurse notified, Pre-medicate for activity    Patients cultural, spiritual, Restoration conflicts given the current situation: no    Objective:     Communicated with: RN prior to session.  Patient found up in chair with peripheral IV upon OT entry to room.    General Precautions: Standard, fall  Orthopedic Precautions: LLE weight bearing as tolerated, RLE weight bearing as tolerated, LUE non weight bearing (LUE: ROMAT)  Braces: UE Sling  Respiratory Status: Room air    Occupational Performance:    Bed Mobility:    Patient completed Sit to Supine with moderate assistance    Functional Mobility/Transfers:  Patient completed Sit <> Stand Transfer with contact guard assistance  with  no assistive device   Patient completed Bed <> Chair Transfer using Step Transfer technique with contact guard assistance with no assistive device  Activities of Daily Living:  Upper Body Dressing: Total A to don sling   Lower Body Dressing: maximal assistance     Cognitive/Visual Perceptual:  Cognitive/Psychosocial Skills:     -       Mood/Affect/Coping skills/emotional control: Agitated    Physical Exam:  Upper Extremity Range of Motion:     -       Right Upper Extremity: WNL  -       Left Upper Extremity: Digit flex/ext severely limited 2/2 pain and edema. Pt refused to allow therapist to further assess ROM 2/2 pain. LUE noted to be very edematous.    Therapeutic Positioning  Risk for acquired pressure injuries is decreased due to ability to get to BSC/toilet with assist.    OT interventions performed during the course of today's session in an effort to prevent and/or reduce acquired pressure injuries:   Therapeutic positioning completed     Skin assessment: all bony 
prominences were assessed    Findings:  No redness noted from pressure injuries. Multiple areas of road rash noted.     OT recommendations for therapeutic positioning throughout hospitalization:   Follow [facility] Pressure Injury Prevention Protocol      Patient Education:  Patient provided with verbal education regarding OT role/goals/POC, post op precautions, and home exercise program .  Understanding was verbalized, however additional teaching warranted.     Patient left HOB elevated with all lines intact and call button in reach    GOALS:   Multidisciplinary Problems       Occupational Therapy Goals          Problem: Occupational Therapy    Goal Priority Disciplines Outcome Interventions   Occupational Therapy Goal     OT, PT/OT Ongoing, Progressing    Description: Goals to be met by: 8/29     Patient will increase functional independence with ADLs by performing:    UE Dressing with Stand-by Assistance.  LE Dressing with Stand-by Assistance.  Grooming while standing at sink with Stand-by Assistance.  Toileting from toilet with Stand-by Assistance for hygiene and clothing management.   Toilet transfer to bedside commode with Stand-by Assistance.  Upper extremity exercise program 10 reps per handout, with IND.                         History:     No past medical history on file.      Past Surgical History:   Procedure Laterality Date    OPEN REDUCTION AND INTERNAL FIXATION (ORIF) OF INJURY OF FOREARM Left 8/13/2023    Procedure: ORIF, FOREARM;  Surgeon: Sunil Schneider DO;  Location: [facility];  Service: Orthopedics;  Laterality: Left;  supine hand table c arm skeletal dyamics possible SI screws       Time Tracking:     OT Date of Treatment: 08/15/23  OT Start Time: 0935  OT Stop Time: 1004  OT Total Time (min): 29 min    Billable Minutes:Evaluation Moderate complexity     8/15/2023     
Add 52 Modifier (Optional): no
Consent: The patient's consent was obtained including but not limited to risks of crusting, scabbing, blistering, scarring, darker or lighter pigmentary change, recurrence, incomplete removal and infection.
Post-Care Instructions: I reviewed with the patient in detail post-care instructions. Patient is to wear sunprotection, and avoid picking at any of the treated lesions. Pt may apply Vaseline to crusted or scabbing areas.
Detail Level: Detailed
Medical Necessity Information: It is in your best interest to select a reason for this procedure from the list below. All of these items fulfill various CMS LCD requirements except the new and changing color options.
Include Z78.9 (Other Specified Conditions Influencing Health Status) As An Associated Diagnosis?: Yes
Medical Necessity Clause: This procedure was medically necessary because the lesions that were treated were:

## 2023-08-31 DIAGNOSIS — E78.5 DYSLIPIDEMIA: ICD-10-CM

## 2023-08-31 PROCEDURE — RXMED WILLOW AMBULATORY MEDICATION CHARGE: Performed by: FAMILY MEDICINE

## 2023-08-31 RX ORDER — ROSUVASTATIN CALCIUM 5 MG/1
TABLET, COATED ORAL
Qty: 100 TABLET | Refills: 1 | Status: SHIPPED | OUTPATIENT
Start: 2023-08-31

## 2023-08-31 RX ORDER — ROSUVASTATIN CALCIUM 5 MG/1
TABLET, COATED ORAL
Qty: 90 TABLET | Refills: 1 | Status: SHIPPED | OUTPATIENT
Start: 2023-08-31 | End: 2023-08-31

## 2023-09-01 ENCOUNTER — PHARMACY VISIT (OUTPATIENT)
Dept: PHARMACY | Facility: MEDICAL CENTER | Age: 73
End: 2023-09-01
Payer: COMMERCIAL

## 2023-09-11 ENCOUNTER — APPOINTMENT (RX ONLY)
Dept: URBAN - METROPOLITAN AREA CLINIC 35 | Facility: CLINIC | Age: 73
Setting detail: DERMATOLOGY
End: 2023-09-11

## 2023-09-11 DIAGNOSIS — B35.1 TINEA UNGUIUM: ICD-10-CM | Status: INADEQUATELY CONTROLLED

## 2023-09-11 DIAGNOSIS — B35.3 TINEA PEDIS: ICD-10-CM

## 2023-09-11 PROCEDURE — ? ORDER TESTS

## 2023-09-11 PROCEDURE — ? COUNSELING

## 2023-09-11 PROCEDURE — 99214 OFFICE O/P EST MOD 30 MIN: CPT

## 2023-09-11 PROCEDURE — ? TREATMENT REGIMEN

## 2023-09-11 PROCEDURE — ? PRESCRIPTION

## 2023-09-11 RX ORDER — KETOCONAZOLE 20 MG/G
1 CREAM TOPICAL TWICE DAILY
Qty: 60 | Refills: 11 | Status: ERX | COMMUNITY
Start: 2023-09-11

## 2023-09-11 RX ADMIN — KETOCONAZOLE 1: 20 CREAM TOPICAL at 00:00

## 2023-09-11 ASSESSMENT — LOCATION DETAILED DESCRIPTION DERM
LOCATION DETAILED: RIGHT DORSAL FOOT
LOCATION DETAILED: LEFT DORSAL FOOT
LOCATION DETAILED: LEFT DISTAL PLANTAR GREAT TOE
LOCATION DETAILED: RIGHT GREAT TOENAIL

## 2023-09-11 ASSESSMENT — LOCATION ZONE DERM
LOCATION ZONE: TOENAIL
LOCATION ZONE: FEET
LOCATION ZONE: TOE

## 2023-09-11 ASSESSMENT — LOCATION SIMPLE DESCRIPTION DERM
LOCATION SIMPLE: RIGHT GREAT TOE
LOCATION SIMPLE: RIGHT FOOT
LOCATION SIMPLE: LEFT GREAT TOE
LOCATION SIMPLE: LEFT FOOT

## 2023-09-11 NOTE — PROCEDURE: MIPS QUALITY
Quality 130: Documentation Of Current Medications In The Medical Record: Current Medications Documented
Detail Level: Generalized
Quality 402: Tobacco Use And Help With Quitting Among Adolescents: Patient screened for tobacco and never smoked
Quality 226: Preventive Care And Screening: Tobacco Use: Screening And Cessation Intervention: Patient screened for tobacco use and is an ex/non-smoker
Quality 111:Pneumonia Vaccination Status For Older Adults: Patient received any pneumococcal conjugate or polysaccharide vaccine on or after their 60th birthday and before the end of the measurement period

## 2023-09-11 NOTE — PROCEDURE: TREATMENT REGIMEN
Detail Level: Zone
Initiate Treatment: - ketoconazole 2 % topical cream. Massage to feet, between toes and over toe nails twice a day until clear, then once a day after showering.

## 2023-09-11 NOTE — PROCEDURE: ORDER TESTS
Expected Date Of Service: 09/11/2023
Bill For Surgical Tray: no
Performing Laboratory: 0
Billing Type: Third-Party Bill

## 2023-09-15 ENCOUNTER — HOSPITAL ENCOUNTER (OUTPATIENT)
Dept: RADIOLOGY | Facility: MEDICAL CENTER | Age: 73
End: 2023-09-15
Attending: FAMILY MEDICINE
Payer: MEDICARE

## 2023-09-15 DIAGNOSIS — N95.8 OTHER SPECIFIED MENOPAUSAL AND PERIMENOPAUSAL DISORDERS: ICD-10-CM

## 2023-09-15 PROCEDURE — 77080 DXA BONE DENSITY AXIAL: CPT

## 2023-09-26 ENCOUNTER — TELEPHONE (OUTPATIENT)
Dept: HEALTH INFORMATION MANAGEMENT | Facility: OTHER | Age: 73
End: 2023-09-26
Payer: MEDICARE

## 2023-09-27 ENCOUNTER — PHARMACY VISIT (OUTPATIENT)
Dept: PHARMACY | Facility: MEDICAL CENTER | Age: 73
End: 2023-09-27
Payer: COMMERCIAL

## 2023-09-27 PROCEDURE — RXMED WILLOW AMBULATORY MEDICATION CHARGE: Performed by: NURSE PRACTITIONER

## 2023-11-06 DIAGNOSIS — M81.0 AGE-RELATED OSTEOPOROSIS WITHOUT CURRENT PATHOLOGICAL FRACTURE: ICD-10-CM

## 2023-11-14 PROCEDURE — RXMED WILLOW AMBULATORY MEDICATION CHARGE: Performed by: NURSE PRACTITIONER

## 2023-11-17 ENCOUNTER — PHARMACY VISIT (OUTPATIENT)
Dept: PHARMACY | Facility: MEDICAL CENTER | Age: 73
End: 2023-11-17
Payer: COMMERCIAL

## 2023-12-08 RX ORDER — PANTOPRAZOLE SODIUM 40 MG/1
40 TABLET, DELAYED RELEASE ORAL DAILY
Qty: 90 TABLET | Refills: 4 | Status: CANCELLED | OUTPATIENT
Start: 2023-12-08

## 2023-12-14 ENCOUNTER — OUTPATIENT INFUSION SERVICES (OUTPATIENT)
Dept: ONCOLOGY | Facility: MEDICAL CENTER | Age: 73
End: 2023-12-14
Attending: FAMILY MEDICINE
Payer: MEDICARE

## 2023-12-14 VITALS
DIASTOLIC BLOOD PRESSURE: 69 MMHG | RESPIRATION RATE: 16 BRPM | TEMPERATURE: 97.2 F | WEIGHT: 143.96 LBS | BODY MASS INDEX: 23.99 KG/M2 | SYSTOLIC BLOOD PRESSURE: 119 MMHG | HEIGHT: 65 IN | OXYGEN SATURATION: 97 % | HEART RATE: 65 BPM

## 2023-12-14 DIAGNOSIS — M81.0 AGE-RELATED OSTEOPOROSIS WITHOUT CURRENT PATHOLOGICAL FRACTURE: ICD-10-CM

## 2023-12-14 LAB
CA-I BLD ISE-SCNC: 1.2 MMOL/L (ref 1.1–1.3)
CREAT BLD-MCNC: 0.6 MG/DL (ref 0.5–1.4)

## 2023-12-14 PROCEDURE — 700111 HCHG RX REV CODE 636 W/ 250 OVERRIDE (IP): Mod: JZ | Performed by: FAMILY MEDICINE

## 2023-12-14 PROCEDURE — 36415 COLL VENOUS BLD VENIPUNCTURE: CPT

## 2023-12-14 PROCEDURE — 82330 ASSAY OF CALCIUM: CPT

## 2023-12-14 PROCEDURE — 82565 ASSAY OF CREATININE: CPT

## 2023-12-14 PROCEDURE — 96372 THER/PROPH/DIAG INJ SC/IM: CPT

## 2023-12-14 RX ADMIN — DENOSUMAB 60 MG: 60 INJECTION SUBCUTANEOUS at 10:31

## 2023-12-14 ASSESSMENT — FIBROSIS 4 INDEX: FIB4 SCORE: 1.32

## 2023-12-14 NOTE — PROGRESS NOTES
Pt arrives to [facility] for first dose of Prolia.  Discussed plan of care with pt and Prolia handout given to pt.  Pt denies any s/sx of infection or recent/planned dental procedures.  ISTAT calcium and creatinine drawn via 23g butterfly needle to R-AC.  Labs reviewed and ok to give Prolia per pharmacy.  Prolia given SC to back of [facility].  Pt observed for 15 minutes post Prolia and no adverse reaction noted.  Email sent to scheduling dept to set up next appt in 6 months.  Pt dc home to self care.

## 2023-12-28 ENCOUNTER — TELEPHONE (OUTPATIENT)
Dept: VASCULAR LAB | Facility: MEDICAL CENTER | Age: 73
End: 2023-12-28
Payer: MEDICARE

## 2023-12-28 DIAGNOSIS — E78.5 DYSLIPIDEMIA: ICD-10-CM

## 2023-12-28 RX ORDER — EZETIMIBE 10 MG/1
10 TABLET ORAL DAILY
Qty: 100 TABLET | Refills: 3 | Status: SHIPPED | OUTPATIENT
Start: 2023-12-28

## 2023-12-29 PROCEDURE — RXMED WILLOW AMBULATORY MEDICATION CHARGE

## 2023-12-29 NOTE — TELEPHONE ENCOUNTER
Attempted to contact patient at [phone] to discuss [facility] and services/benefits offered. No answer, left voicemail.    Lori Kidd  Rx Coordinator   [phone]

## 2023-12-29 NOTE — TELEPHONE ENCOUNTER
Received Refill PA request via [facility]  for EZETIMIBE 10MG TABLETS. (Quantity:90, Day Supply:90)     Insurance: [employer]   Member ID:  [id number]  BIN: [id number]  PCN: [id number]  Group: [id number]     Ran Test claim via [place] & medication Pays for a $12.96/90DS ; $4.62/30DS copay. Will outreach to patient to offer specialty pharmacy services and or release to preferred pharmacy    CAITIE Morris, PhT  Pharmacy Liaison (Rx Coordinator)  P: [phone]  12/28/2023 4:27 PM

## 2024-01-02 ENCOUNTER — PHARMACY VISIT (OUTPATIENT)
Dept: PHARMACY | Facility: MEDICAL CENTER | Age: 74
End: 2024-01-02
Payer: COMMERCIAL

## 2024-01-15 ENCOUNTER — APPOINTMENT (RX ONLY)
Dept: URBAN - METROPOLITAN AREA CLINIC 35 | Facility: CLINIC | Age: 74
Setting detail: DERMATOLOGY
End: 2024-01-15

## 2024-01-15 DIAGNOSIS — Z87.2 PERSONAL HISTORY OF DISEASES OF THE SKIN AND SUBCUTANEOUS TISSUE: ICD-10-CM

## 2024-01-15 DIAGNOSIS — L71.8 OTHER ROSACEA: ICD-10-CM

## 2024-01-15 DIAGNOSIS — L82.0 INFLAMED SEBORRHEIC KERATOSIS: ICD-10-CM

## 2024-01-15 DIAGNOSIS — L82.1 OTHER SEBORRHEIC KERATOSIS: ICD-10-CM

## 2024-01-15 DIAGNOSIS — D17 BENIGN LIPOMATOUS NEOPLASM: ICD-10-CM

## 2024-01-15 DIAGNOSIS — D22 MELANOCYTIC NEVI: ICD-10-CM

## 2024-01-15 DIAGNOSIS — L81.4 OTHER MELANIN HYPERPIGMENTATION: ICD-10-CM

## 2024-01-15 DIAGNOSIS — L72.0 EPIDERMAL CYST: ICD-10-CM

## 2024-01-15 DIAGNOSIS — Z85.828 PERSONAL HISTORY OF OTHER MALIGNANT NEOPLASM OF SKIN: ICD-10-CM

## 2024-01-15 DIAGNOSIS — Z71.89 OTHER SPECIFIED COUNSELING: ICD-10-CM

## 2024-01-15 DIAGNOSIS — D18.0 HEMANGIOMA: ICD-10-CM

## 2024-01-15 PROBLEM — D22.61 MELANOCYTIC NEVI OF RIGHT UPPER LIMB, INCLUDING SHOULDER: Status: ACTIVE | Noted: 2024-01-15

## 2024-01-15 PROBLEM — D18.01 HEMANGIOMA OF SKIN AND SUBCUTANEOUS TISSUE: Status: ACTIVE | Noted: 2024-01-15

## 2024-01-15 PROBLEM — D17.1 BENIGN LIPOMATOUS NEOPLASM OF SKIN AND SUBCUTANEOUS TISSUE OF TRUNK: Status: ACTIVE | Noted: 2024-01-15

## 2024-01-15 PROBLEM — D17.21 BENIGN LIPOMATOUS NEOPLASM OF SKIN AND SUBCUTANEOUS TISSUE OF RIGHT ARM: Status: ACTIVE | Noted: 2024-01-15

## 2024-01-15 PROBLEM — D23.5 OTHER BENIGN NEOPLASM OF SKIN OF TRUNK: Status: ACTIVE | Noted: 2024-01-15

## 2024-01-15 PROBLEM — D17.24 BENIGN LIPOMATOUS NEOPLASM OF SKIN AND SUBCUTANEOUS TISSUE OF LEFT LEG: Status: ACTIVE | Noted: 2024-01-15

## 2024-01-15 PROBLEM — D17.23 BENIGN LIPOMATOUS NEOPLASM OF SKIN AND SUBCUTANEOUS TISSUE OF RIGHT LEG: Status: ACTIVE | Noted: 2024-01-15

## 2024-01-15 PROBLEM — D17.22 BENIGN LIPOMATOUS NEOPLASM OF SKIN AND SUBCUTANEOUS TISSUE OF LEFT ARM: Status: ACTIVE | Noted: 2024-01-15

## 2024-01-15 PROCEDURE — ? SUNSCREEN RECOMMENDATIONS

## 2024-01-15 PROCEDURE — ? LIQUID NITROGEN

## 2024-01-15 PROCEDURE — 99213 OFFICE O/P EST LOW 20 MIN: CPT | Mod: 25

## 2024-01-15 PROCEDURE — ? TREATMENT REGIMEN

## 2024-01-15 PROCEDURE — 17110 DESTRUCTION B9 LES UP TO 14: CPT | Mod: 52

## 2024-01-15 PROCEDURE — ? ADDITIONAL NOTES

## 2024-01-15 PROCEDURE — ? EXTRACTIONS

## 2024-01-15 PROCEDURE — ? COUNSELING

## 2024-01-15 ASSESSMENT — LOCATION SIMPLE DESCRIPTION DERM
LOCATION SIMPLE: LEFT SHOULDER
LOCATION SIMPLE: RIGHT SHOULDER
LOCATION SIMPLE: RIGHT FOREARM
LOCATION SIMPLE: LEFT UPPER BACK
LOCATION SIMPLE: LEFT PRETIBIAL REGION
LOCATION SIMPLE: ABDOMEN
LOCATION SIMPLE: LEFT FOREARM
LOCATION SIMPLE: RIGHT EYEBROW
LOCATION SIMPLE: RIGHT THIGH
LOCATION SIMPLE: LEFT THIGH
LOCATION SIMPLE: CHEST
LOCATION SIMPLE: LEFT CHEEK

## 2024-01-15 ASSESSMENT — LOCATION DETAILED DESCRIPTION DERM
LOCATION DETAILED: RIGHT MEDIAL EYEBROW
LOCATION DETAILED: LEFT POSTERIOR SHOULDER
LOCATION DETAILED: RIGHT MEDIAL SUPERIOR CHEST
LOCATION DETAILED: LEFT RIB CAGE
LOCATION DETAILED: RIGHT PROXIMAL DORSAL FOREARM
LOCATION DETAILED: LEFT INFERIOR UPPER BACK
LOCATION DETAILED: LEFT MID-UPPER BACK
LOCATION DETAILED: LEFT INFERIOR LATERAL MALAR CHEEK
LOCATION DETAILED: RIGHT POSTERIOR SHOULDER
LOCATION DETAILED: RIGHT ANTERIOR DISTAL THIGH
LOCATION DETAILED: LEFT PROXIMAL DORSAL FOREARM
LOCATION DETAILED: LEFT PROXIMAL PRETIBIAL REGION
LOCATION DETAILED: LEFT LATERAL SUPERIOR CHEST
LOCATION DETAILED: LEFT ANTERIOR PROXIMAL THIGH

## 2024-01-15 ASSESSMENT — LOCATION ZONE DERM
LOCATION ZONE: LEG
LOCATION ZONE: ARM
LOCATION ZONE: FACE
LOCATION ZONE: TRUNK

## 2024-01-15 NOTE — PROCEDURE: EXTRACTIONS
Render Number Of Lesions Treated: no
Prep Text (Optional): Prior to removal the treatment areas were prepped in the usual fashion.
Consent was obtained and risks were reviewed including but not limited to scarring, infection, bleeding, scabbing, incomplete removal, and allergy to anesthesia.
Post-Care Instructions: I reviewed with the patient in detail post-care instructions. Patient is to keep the treatment areas dry overnight, and then apply bacitracin twice daily until healed. Patient may apply hydrogen peroxide soaks to remove any crusting.
Acne Type: Comedonal Lesions
Detail Level: Detailed
Extraction Method: cotton-tipped applicators and gentle pressure

## 2024-01-15 NOTE — PROCEDURE: ADDITIONAL NOTES
Detail Level: Zone
Additional Notes: - pt gave verbal consent for Karen to freeze spots today
Render Risk Assessment In Note?: yes

## 2024-01-15 NOTE — PROCEDURE: LIQUID NITROGEN
Show Topical Anesthesia Variable?: Yes
Number Of Freeze-Thaw Cycles: 2 freeze-thaw cycles
Medical Necessity Information: It is in your best interest to select a reason for this procedure from the list below. All of these items fulfill various CMS LCD requirements except the new and changing color options.
Spray Paint Technique: No
Spray Paint Text: The liquid nitrogen was applied to the skin utilizing a spray paint frosting technique.
Application Tool (Optional): Cry-AC
Medical Necessity Clause: This procedure was medically necessary because the lesions that were treated were:
Post-Care Instructions: I reviewed with the patient in detail post-care instructions. Patient is to wear sunprotection, and avoid picking at any of the treated lesions. Pt may apply Vaseline to crusted or scabbing areas.
Detail Level: Detailed
Consent: The patient's consent was obtained including but not limited to risks of crusting, scabbing, blistering, scarring, darker or lighter pigmentary change, recurrence, incomplete removal and infection.

## 2024-01-24 ENCOUNTER — APPOINTMENT (OUTPATIENT)
Dept: RADIOLOGY | Facility: MEDICAL CENTER | Age: 74
End: 2024-01-24
Attending: NURSE PRACTITIONER
Payer: MEDICARE

## 2024-01-24 DIAGNOSIS — Z12.31 VISIT FOR SCREENING MAMMOGRAM: ICD-10-CM

## 2024-01-24 PROCEDURE — 77067 SCR MAMMO BI INCL CAD: CPT

## 2024-04-11 ENCOUNTER — HOSPITAL ENCOUNTER (OUTPATIENT)
Dept: LAB | Facility: MEDICAL CENTER | Age: 74
End: 2024-04-11
Attending: FAMILY MEDICINE
Payer: MEDICARE

## 2024-04-11 LAB
25(OH)D3 SERPL-MCNC: 52 NG/ML (ref 30–100)
ALBUMIN SERPL BCP-MCNC: 4.1 G/DL (ref 3.2–4.9)
ALBUMIN/GLOB SERPL: 1.7 G/DL
ALP SERPL-CCNC: 80 U/L (ref 30–99)
ALT SERPL-CCNC: 20 U/L (ref 2–50)
ANION GAP SERPL CALC-SCNC: 12 MMOL/L (ref 7–16)
AST SERPL-CCNC: 22 U/L (ref 12–45)
BASOPHILS # BLD AUTO: 1.1 % (ref 0–1.8)
BASOPHILS # BLD: 0.05 K/UL (ref 0–0.12)
BILIRUB SERPL-MCNC: 0.7 MG/DL (ref 0.1–1.5)
BUN SERPL-MCNC: 15 MG/DL (ref 8–22)
CALCIUM ALBUM COR SERPL-MCNC: 8.6 MG/DL (ref 8.5–10.5)
CALCIUM SERPL-MCNC: 8.7 MG/DL (ref 8.5–10.5)
CHLORIDE SERPL-SCNC: 107 MMOL/L (ref 96–112)
CHOLEST SERPL-MCNC: 130 MG/DL (ref 100–199)
CO2 SERPL-SCNC: 21 MMOL/L (ref 20–33)
CREAT SERPL-MCNC: 0.54 MG/DL (ref 0.5–1.4)
EOSINOPHIL # BLD AUTO: 0.13 K/UL (ref 0–0.51)
EOSINOPHIL NFR BLD: 2.9 % (ref 0–6.9)
ERYTHROCYTE [DISTWIDTH] IN BLOOD BY AUTOMATED COUNT: 42.7 FL (ref 35.9–50)
FASTING STATUS PATIENT QL REPORTED: NORMAL
GFR SERPLBLD CREATININE-BSD FMLA CKD-EPI: 95 ML/MIN/1.73 M 2
GLOBULIN SER CALC-MCNC: 2.4 G/DL (ref 1.9–3.5)
GLUCOSE SERPL-MCNC: 84 MG/DL (ref 65–99)
HCT VFR BLD AUTO: 43.5 % (ref 37–47)
HDLC SERPL-MCNC: 56 MG/DL
HGB BLD-MCNC: 14.8 G/DL (ref 12–16)
IMM GRANULOCYTES # BLD AUTO: 0.01 K/UL (ref 0–0.11)
IMM GRANULOCYTES NFR BLD AUTO: 0.2 % (ref 0–0.9)
LDLC SERPL CALC-MCNC: 53 MG/DL
LYMPHOCYTES # BLD AUTO: 0.59 K/UL (ref 1–4.8)
LYMPHOCYTES NFR BLD: 13 % (ref 22–41)
MCH RBC QN AUTO: 31 PG (ref 27–33)
MCHC RBC AUTO-ENTMCNC: 34 G/DL (ref 32.2–35.5)
MCV RBC AUTO: 91.2 FL (ref 81.4–97.8)
MONOCYTES # BLD AUTO: 0.48 K/UL (ref 0–0.85)
MONOCYTES NFR BLD AUTO: 10.6 % (ref 0–13.4)
NEUTROPHILS # BLD AUTO: 3.28 K/UL (ref 1.82–7.42)
NEUTROPHILS NFR BLD: 72.2 % (ref 44–72)
NRBC # BLD AUTO: 0 K/UL
NRBC BLD-RTO: 0 /100 WBC (ref 0–0.2)
PLATELET # BLD AUTO: 234 K/UL (ref 164–446)
PMV BLD AUTO: 10 FL (ref 9–12.9)
POTASSIUM SERPL-SCNC: 4 MMOL/L (ref 3.6–5.5)
PROT SERPL-MCNC: 6.5 G/DL (ref 6–8.2)
RBC # BLD AUTO: 4.77 M/UL (ref 4.2–5.4)
SODIUM SERPL-SCNC: 140 MMOL/L (ref 135–145)
TRIGL SERPL-MCNC: 106 MG/DL (ref 0–149)
TSH SERPL DL<=0.005 MIU/L-ACNC: 2.12 UIU/ML (ref 0.38–5.33)
WBC # BLD AUTO: 4.5 K/UL (ref 4.8–10.8)

## 2024-04-11 PROCEDURE — 85025 COMPLETE CBC W/AUTO DIFF WBC: CPT

## 2024-04-11 PROCEDURE — 82306 VITAMIN D 25 HYDROXY: CPT

## 2024-04-11 PROCEDURE — 80061 LIPID PANEL: CPT

## 2024-04-11 PROCEDURE — RXMED WILLOW AMBULATORY MEDICATION CHARGE: Performed by: NURSE PRACTITIONER

## 2024-04-11 PROCEDURE — 36415 COLL VENOUS BLD VENIPUNCTURE: CPT

## 2024-04-11 PROCEDURE — 84443 ASSAY THYROID STIM HORMONE: CPT

## 2024-04-11 PROCEDURE — 80053 COMPREHEN METABOLIC PANEL: CPT

## 2024-04-17 ENCOUNTER — PHARMACY VISIT (OUTPATIENT)
Dept: PHARMACY | Facility: MEDICAL CENTER | Age: 74
End: 2024-04-17
Payer: COMMERCIAL

## 2024-04-17 PROCEDURE — RXMED WILLOW AMBULATORY MEDICATION CHARGE

## 2024-04-22 ENCOUNTER — TELEPHONE (OUTPATIENT)
Dept: VASCULAR LAB | Facility: MEDICAL CENTER | Age: 74
End: 2024-04-22
Payer: MEDICARE

## 2024-04-22 DIAGNOSIS — I65.23 CAROTID ATHEROSCLEROSIS, BILATERAL: ICD-10-CM

## 2024-04-22 NOTE — TELEPHONE ENCOUNTER
Established patient  Chart prep for upcoming appointment with Vascular APRN    Any pending/incomplete orders from last visit? No, all orders completed.  Was patient called and reminded to complete pending orders? N/A orders complete  Were any records requested?  No  Correct appointment type (New/Established/virtual)? Yes  Referral up to date? Yes renewal ordered, sent to provider to co-sign, AND new referral attached to upcoming appointment.    Referral attached to appointment (renewals and New patients only)? Yes renewal ordered and sent to provider to co-sign

## 2024-04-23 ENCOUNTER — OFFICE VISIT (OUTPATIENT)
Dept: VASCULAR LAB | Facility: MEDICAL CENTER | Age: 74
End: 2024-04-23
Attending: NURSE PRACTITIONER
Payer: MEDICARE

## 2024-04-23 VITALS
SYSTOLIC BLOOD PRESSURE: 131 MMHG | HEIGHT: 66 IN | HEART RATE: 66 BPM | DIASTOLIC BLOOD PRESSURE: 83 MMHG | BODY MASS INDEX: 22.98 KG/M2 | WEIGHT: 143 LBS

## 2024-04-23 DIAGNOSIS — I65.23 BILATERAL CAROTID ARTERY STENOSIS: ICD-10-CM

## 2024-04-23 DIAGNOSIS — E78.5 DYSLIPIDEMIA: ICD-10-CM

## 2024-04-23 DIAGNOSIS — I72.8 ANEURYSM OF SPLENIC ARTERY (HCC): ICD-10-CM

## 2024-04-23 PROCEDURE — 99212 OFFICE O/P EST SF 10 MIN: CPT

## 2024-04-23 PROCEDURE — 3079F DIAST BP 80-89 MM HG: CPT | Performed by: NURSE PRACTITIONER

## 2024-04-23 PROCEDURE — 99214 OFFICE O/P EST MOD 30 MIN: CPT | Performed by: NURSE PRACTITIONER

## 2024-04-23 PROCEDURE — 3075F SYST BP GE 130 - 139MM HG: CPT | Performed by: NURSE PRACTITIONER

## 2024-04-23 RX ORDER — DENOSUMAB 60 MG/ML
60 INJECTION SUBCUTANEOUS
COMMUNITY

## 2024-04-23 ASSESSMENT — ENCOUNTER SYMPTOMS
SEIZURES: 0
SHORTNESS OF BREATH: 0
HEMOPTYSIS: 0
FOCAL WEAKNESS: 0
PALPITATIONS: 0
TREMORS: 0
WEAKNESS: 0
WHEEZING: 0
DOUBLE VISION: 0
BRUISES/BLEEDS EASILY: 0
DIZZINESS: 0
COUGH: 0
BLURRED VISION: 0
BLOOD IN STOOL: 0
MYALGIAS: 0
ABDOMINAL PAIN: 0
HEADACHES: 0

## 2024-04-23 ASSESSMENT — FIBROSIS 4 INDEX: FIB4 SCORE: 1.58

## 2024-04-23 NOTE — PROGRESS NOTES
FOLLOW-UP VASCULAR VISIT    Subjective:   Isabel Peralta is a 73 y.o. female who presents today 2024 for   Chief Complaint   Patient presents with    Follow-Up     HPI:  Feeling well overall  Denies CP, SOB, palpitations  Home BP a few readings -150, most consistently <130/80    Hyperlipidemia:    Stable, no current concerns  Current treatment: rosuva 5mg 4 times a week + Zetia daily  Myalgias? No  Other adverse drug reactions? No    Carotid artery disease:   No h/o tia or cva symptoms including presyncope, syncope or HA.  No current symptoms     Splenic a. Aneurysm - denies LUQ pain or dullness, stable on prior serial exams.    Antiplat: Taking ASA w/o bleeding issues.     Social History     Tobacco Use   Smoking Status Former    Current packs/day: 0.00    Average packs/day: 0.5 packs/day for 4.0 years (2.0 ttl pk-yrs)    Types: Cigarettes    Start date: 1967    Quit date: 1971    Years since quittin.3   Smokeless Tobacco Never   Tobacco Comments    quit 49 years ago     Social History     Tobacco Use    Smoking status: Former     Current packs/day: 0.00     Average packs/day: 0.5 packs/day for 4.0 years (2.0 ttl pk-yrs)     Types: Cigarettes     Start date: 1967     Quit date: 1971     Years since quittin.3    Smokeless tobacco: Never    Tobacco comments:     quit 49 years ago   Vaping Use    Vaping Use: Never used   Substance Use Topics    Alcohol use: Not Currently     Comment: on the holidays    Drug use: No     Outpatient Encounter Medications as of 2024   Medication Sig Dispense Refill    denosumab (PROLIA) 60 MG/ML Solution Prefilled Syringe injection Inject 60 mg under the skin every 6 months.      ezetimibe (ZETIA) 10 MG Tab Take 1 tablet by mouth once daily 100 Tablet 3    FAMOTIDINE PO Take 20 mg by mouth every day.      rosuvastatin (CRESTOR) 5 MG Tab Take 1 tablet by mouth on Sundays, , , and  100 Tablet 1    pantoprazole  "(PROTONIX) 40 MG Tablet Delayed Response Take 1 tablet by mouth once daily 90 Tablet 2    Ascorbic Acid (VITAMIN C GUMMIE PO) Take 2 Tablets by mouth every evening.      Calcium-Phosphorus-Vitamin D (CALCIUM/D3 ADULT GUMMIES PO) Take 2 Tablets by mouth 2 times a day.      ibuprofen (MOTRIN) 200 MG TABS Take 1 Tablet by mouth every 6 hours as needed. Indications: Pain      estradiol (VAGIFEM) 10 MCG TABS Insert 1 Tablet into the vagina see administration instructions. Twice a week, on Sunday and Wed      aspirin 81 MG tablet Take 1 Tablet by mouth every day.      triamcinolone (NASACORT) 55 MCG/ACT nasal inhaler Administer 1 Spray into affected nostril(S) 2 times a day.      [DISCONTINUED] estradiol (YUVAFEM) 10 MCG Tab Take 1 tablet vaginally biweekly 24 Tablet 4     No facility-administered encounter medications on file as of 4/23/2024.     Allergies   Allergen Reactions    Gluten Meal      Belching, burping, bloating, \"stomach churning\", constipation, not able to sleep      DIET AND EXERCISE:  Weight Change: stable  BMI Readings from Last 4 Encounters:   04/23/24 23.08 kg/m²   12/14/23 23.99 kg/m²   04/03/23 23.08 kg/m²   01/30/23 23.24 kg/m²      Diet: gluten-free with high volume veg/fruits due to celiac dz   Exercise: moderate regular exercise program, doing PT for shoulder, stays active with house and yard work.     Review of Systems   Constitutional:  Negative for malaise/fatigue.   HENT:  Negative for nosebleeds.    Eyes:  Negative for blurred vision and double vision.   Respiratory:  Negative for cough, hemoptysis, shortness of breath and wheezing.    Cardiovascular:  Negative for chest pain, palpitations and leg swelling.   Gastrointestinal:  Negative for abdominal pain, blood in stool and melena.   Genitourinary:  Negative for hematuria.   Musculoskeletal:  Negative for joint pain and myalgias.   Neurological:  Negative for dizziness, tremors, focal weakness, seizures, weakness and headaches. " "  Endo/Heme/Allergies:  Does not bruise/bleed easily.      Objective:     Vitals:    04/23/24 1414   BP: 131/83   BP Location: Left arm   Patient Position: Sitting   BP Cuff Size: Adult   Pulse: 66   Weight: 64.9 kg (143 lb)   Height: 1.676 m (5' 6\")      BP Readings from Last 4 Encounters:   04/23/24 131/83   12/14/23 119/69   04/03/23 118/72   11/16/22 112/80     Body mass index is 23.08 kg/m².  Physical Exam  Vitals reviewed.   Constitutional:       General: She is not in acute distress.     Appearance: Normal appearance. She is well-developed. She is not diaphoretic.   HENT:      Head: Normocephalic and atraumatic.   Cardiovascular:      Rate and Rhythm: Normal rate and regular rhythm.      Heart sounds: Normal heart sounds. No murmur heard.  Pulmonary:      Effort: Pulmonary effort is normal. No respiratory distress.      Breath sounds: Normal breath sounds. No wheezing.   Musculoskeletal:         General: No swelling. Normal range of motion.      Right lower leg: No edema.      Left lower leg: No edema.   Skin:     General: Skin is warm and dry.      Coloration: Skin is not pale.   Neurological:      General: No focal deficit present.      Mental Status: She is alert and oriented to person, place, and time.      Coordination: Coordination normal.      Gait: Gait normal.      Deep Tendon Reflexes: Reflexes are normal and symmetric.   Psychiatric:         Mood and Affect: Mood normal.         Behavior: Behavior normal.       Lab Results   Component Value Date    CHOLSTRLTOT 130 04/11/2024    LDL 53 04/11/2024    HDL 56 04/11/2024    TRIGLYCERIDE 106 04/11/2024           Lab Results   Component Value Date    SODIUM 140 04/11/2024    POTASSIUM 4.0 04/11/2024    CHLORIDE 107 04/11/2024    CO2 21 04/11/2024    GLUCOSE 84 04/11/2024    BUN 15 04/11/2024    CREATININE 0.54 04/11/2024        Lab Results   Component Value Date    WBC 4.5 (L) 04/11/2024    RBC 4.77 04/11/2024    HEMOGLOBIN 14.8 04/11/2024    HEMATOCRIT " 43.5 04/11/2024    MCV 91.2 04/11/2024    MCH 31.0 04/11/2024    MCHC 34.0 04/11/2024    MPV 10.0 04/11/2024      CTA abdomen aug 2016:  1.  There is no change in 2 small splenic artery aneurysms measuring 10-11 mm in diameter with minimal peripheral thrombus and some peripheral calcification.  2.  There is mild atherosclerosis of the abdominal aorta.  3.  There is normal variant branching at the celiac axis with the common hepatic artery originating directly from the abdominal aorta at the level of the celiac axis.  4.  There may be a small hiatal hernia again seen    Stress MPI Jan 2017:  NUCLEAR IMAGING INTERPRETATION   No evidence of significant jeopardized viable myocardium or prior myocardial    infarction.   Normal left ventricular wall motion.  LV ejection fraction = 77%.   ECG INTERPRETATION   Negative stress ECG for ischemia.    Carotid duplex april 2017:  1.  There is a mild amount of atherosclerotic plaque.  Plaque is located in carotid bulbs and proximal internal and external carotid arteries.  Plaque characterization:  Calcific and irregular  2.  There is no evidence of carotid occlusion.  3. Vertebral arteries demonstrate antegrade flow.  4. Diameter reduction in the internal carotid arteries: less than 50%. There is no hemodynamically significant stenosis.    MRI head april 2017:  MRI of the brain without and with contrast within normal limits.    CTA abd/pelvis 4/8/19  1.  There is no interval change in 2 small splenic artery aneurysms each measuring 10-11 mm in diameter with minimal peripheral thrombus and calcification.  2.  There is mild atherosclerosis of the abdominal aorta.  3.  There are no suspicious parenchymal organ findings.  Variant anatomy with the common hepatic artery originating directly from the aorta is again noted. Celiac axis and SMA are patent. CYNTHIA is patent. Both renal arteries are patent  .  Total Vascular screening 5/22/20    1. Mild atherosclerosis as detailed above.   2. No  significant stenosis seen.    CTA ABD 3/28/22    1.  2 partially calcified splenic artery aneurysms unchanged compared to previous.     2.  Mild/moderate calcified plaque abdominal aorta. No aneurysmal dilatation and no dissection.    Medical Decision Making:  Today's Assessment / Status / Plan:     1. Aneurysm of splenic artery (HCC)        2. Dyslipidemia  Lipid Profile    Basic Metabolic Panel      3. Bilateral carotid artery stenosis          Patient Type: Primary Prevention.    Etiology of Established CVD if Present:   1) Mild carotid atherosclerosis, asymptomatic, no previous intervention  2) Splenic artery aneurysm - stable over time    Lipid Management: Qualifies for Statin Therapy Based on 2013 ACC/AHA Guidelines: yes  Calculated 10-Year Risk of ASCVD:  10.4%  Currently on Statin:  Crestor 5mg 4x/week  On Zetia daily  Given subclinical atherosclerosis and FH would like to treat as high risk with goal LDL-C <70 and nonHDL <100.    Lp(a) normal.   - currently at goal:  Yes   Plan:  - continue zetia 10mg daily   - continue Crestor 5 mg, 4x per week   - stop and call if any myalgias or abdominal symptoms  - continue gluten-free diet  - follow blood work q12mo    Blood Pressure Management: ACC/AHA (2017) goal <130/80  Home BP at goal: yes, occasional -150  Office BP at goal:  Yes  Echo: not done   ACR: note done   Plan:   Monitoring:   - continue home BP monitoring, reviewed correct technique:  Yes   - call if BP consistently >130/80  - order 24h ABPM:  NO  Medications: no meds indicated at this time   - continue home BP readings and bring into next visit      Glycemic Status: Normal    Anti-Platelet/Anti-Coagulant Tx: yes  - continue low dose asa  - monitor for s/s bleeding    Smoking: continued complete avoidance of all tobacco products     Physical Activity: continue healthy activity to improve CV fitness; walking on treadmill at home     Weight Management and Nutrition: Dietary plan was discussed with  patient at this visit including DASH, low sodium     Other:     1) Mild carotid atherosclerosis, asymptomatic, no previous intervention. - Continue medical management.  Long discussion with pt regarding surveillance and imaging, reviewed that insurance will likely not cover repeat imaging for carotid US at this time, discussed option of doing a total vascular screening with [facility] (last done 2020).  Through shared decision making, pt will hold off on repeat surveillance imaging at this time and will consider in future (possibly 2025) or sooner if develop symptoms.  Red flags reviewed.  - Repeat duplex in future, or total vasc screening with [facility] in another 2 years (possible 2025) or with symptoms     2) Splenic artery aneurysm - stable over time.  No current symptoms  - Repeat CTA every 3 yrs (2025)    3) palpitations - 1x event; resolved.  Had visit with cards, no monitor.    Instructed to follow-up with PCP for remainder of adult medical needs: yes  We will partner with other providers in the management of established vascular disease and cardiometabolic risk factors.    Studies to Be Obtained: 1) CTA abdomen in 3 years (4/2025),   2) total vasc screen with [facility] (4/2025)    Labs to Be Obtained:     Follow up in: 1 yr with Dr. Bloch Ashlee A Crawford, A.P.N.      Cc:  ANJEL Bean MD

## 2024-04-23 NOTE — Clinical Note
If not already on surveillance erendira-- please add CTA abd (4/2025) and total vasc screen [facility] (4/2025)  Thks! Osman

## 2024-05-28 DIAGNOSIS — E78.5 DYSLIPIDEMIA: ICD-10-CM

## 2024-05-28 RX ORDER — ROSUVASTATIN CALCIUM 5 MG/1
TABLET, COATED ORAL
Qty: 100 TABLET | Refills: 3 | Status: SHIPPED | OUTPATIENT
Start: 2024-05-28

## 2024-05-30 ENCOUNTER — PHARMACY VISIT (OUTPATIENT)
Dept: PHARMACY | Facility: MEDICAL CENTER | Age: 74
End: 2024-05-30
Payer: COMMERCIAL

## 2024-05-30 PROCEDURE — RXMED WILLOW AMBULATORY MEDICATION CHARGE: Performed by: NURSE PRACTITIONER

## 2024-06-11 ENCOUNTER — HOSPITAL ENCOUNTER (OUTPATIENT)
Dept: LAB | Facility: MEDICAL CENTER | Age: 74
End: 2024-06-11
Attending: FAMILY MEDICINE
Payer: MEDICARE

## 2024-06-11 LAB
BASOPHILS # BLD AUTO: 0.6 % (ref 0–1.8)
BASOPHILS # BLD: 0.03 K/UL (ref 0–0.12)
EOSINOPHIL # BLD AUTO: 0.14 K/UL (ref 0–0.51)
EOSINOPHIL NFR BLD: 3 % (ref 0–6.9)
ERYTHROCYTE [DISTWIDTH] IN BLOOD BY AUTOMATED COUNT: 44.8 FL (ref 35.9–50)
HCT VFR BLD AUTO: 42.6 % (ref 37–47)
HGB BLD-MCNC: 14.4 G/DL (ref 12–16)
IMM GRANULOCYTES # BLD AUTO: 0.02 K/UL (ref 0–0.11)
IMM GRANULOCYTES NFR BLD AUTO: 0.4 % (ref 0–0.9)
LYMPHOCYTES # BLD AUTO: 0.47 K/UL (ref 1–4.8)
LYMPHOCYTES NFR BLD: 10.1 % (ref 22–41)
MCH RBC QN AUTO: 31.6 PG (ref 27–33)
MCHC RBC AUTO-ENTMCNC: 33.8 G/DL (ref 32.2–35.5)
MCV RBC AUTO: 93.4 FL (ref 81.4–97.8)
MONOCYTES # BLD AUTO: 0.61 K/UL (ref 0–0.85)
MONOCYTES NFR BLD AUTO: 13.1 % (ref 0–13.4)
NEUTROPHILS # BLD AUTO: 3.38 K/UL (ref 1.82–7.42)
NEUTROPHILS NFR BLD: 72.8 % (ref 44–72)
NRBC # BLD AUTO: 0 K/UL
NRBC BLD-RTO: 0 /100 WBC (ref 0–0.2)
PLATELET # BLD AUTO: 260 K/UL (ref 164–446)
PMV BLD AUTO: 10.7 FL (ref 9–12.9)
RBC # BLD AUTO: 4.56 M/UL (ref 4.2–5.4)
WBC # BLD AUTO: 4.7 K/UL (ref 4.8–10.8)

## 2024-06-11 PROCEDURE — 36415 COLL VENOUS BLD VENIPUNCTURE: CPT

## 2024-06-11 PROCEDURE — 85025 COMPLETE CBC W/AUTO DIFF WBC: CPT

## 2024-06-18 ENCOUNTER — OUTPATIENT INFUSION SERVICES (OUTPATIENT)
Dept: ONCOLOGY | Facility: MEDICAL CENTER | Age: 74
End: 2024-06-18
Attending: FAMILY MEDICINE
Payer: MEDICARE

## 2024-06-18 VITALS
HEART RATE: 74 BPM | TEMPERATURE: 97.4 F | WEIGHT: 141.98 LBS | SYSTOLIC BLOOD PRESSURE: 129 MMHG | HEIGHT: 66 IN | RESPIRATION RATE: 16 BRPM | BODY MASS INDEX: 22.82 KG/M2 | DIASTOLIC BLOOD PRESSURE: 78 MMHG | OXYGEN SATURATION: 95 %

## 2024-06-18 DIAGNOSIS — M81.0 AGE-RELATED OSTEOPOROSIS WITHOUT CURRENT PATHOLOGICAL FRACTURE: ICD-10-CM

## 2024-06-18 LAB
CA-I BLD ISE-SCNC: 1.18 MMOL/L (ref 1.1–1.3)
CREAT BLD-MCNC: 0.8 MG/DL (ref 0.5–1.4)

## 2024-06-18 PROCEDURE — 96372 THER/PROPH/DIAG INJ SC/IM: CPT

## 2024-06-18 PROCEDURE — 82330 ASSAY OF CALCIUM: CPT

## 2024-06-18 PROCEDURE — 82565 ASSAY OF CREATININE: CPT

## 2024-06-18 PROCEDURE — 36415 COLL VENOUS BLD VENIPUNCTURE: CPT

## 2024-06-18 PROCEDURE — 700111 HCHG RX REV CODE 636 W/ 250 OVERRIDE (IP): Mod: JZ | Performed by: FAMILY MEDICINE

## 2024-06-18 RX ADMIN — DENOSUMAB 60 MG: 60 INJECTION SUBCUTANEOUS at 13:55

## 2024-06-18 ASSESSMENT — FIBROSIS 4 INDEX: FIB4 SCORE: 1.42

## 2024-06-18 NOTE — PROGRESS NOTES
Pt arrived ambulatory to  for Prolia. POC discussed. Pt denies active infections or recent/upcoming invasive dental procedures. Labs drawn as ordered from [facility], results reviewed, pt appropriate for treatment today. Prolia given as ordered to back of L arm, pt tolerated well. Next appointment confirmed. Pt discharged from [facility] in NAD under self care.

## 2024-07-01 ENCOUNTER — APPOINTMENT (RX ONLY)
Dept: URBAN - METROPOLITAN AREA CLINIC 35 | Facility: CLINIC | Age: 74
Setting detail: DERMATOLOGY
End: 2024-07-01

## 2024-07-01 DIAGNOSIS — Z71.89 OTHER SPECIFIED COUNSELING: ICD-10-CM

## 2024-07-01 DIAGNOSIS — L82.0 INFLAMED SEBORRHEIC KERATOSIS: ICD-10-CM

## 2024-07-01 DIAGNOSIS — Z87.2 PERSONAL HISTORY OF DISEASES OF THE SKIN AND SUBCUTANEOUS TISSUE: ICD-10-CM

## 2024-07-01 DIAGNOSIS — L71.8 OTHER ROSACEA: ICD-10-CM

## 2024-07-01 DIAGNOSIS — Z85.828 PERSONAL HISTORY OF OTHER MALIGNANT NEOPLASM OF SKIN: ICD-10-CM

## 2024-07-01 DIAGNOSIS — D17 BENIGN LIPOMATOUS NEOPLASM: ICD-10-CM

## 2024-07-01 DIAGNOSIS — L81.4 OTHER MELANIN HYPERPIGMENTATION: ICD-10-CM

## 2024-07-01 DIAGNOSIS — D22 MELANOCYTIC NEVI: ICD-10-CM

## 2024-07-01 DIAGNOSIS — L82.1 OTHER SEBORRHEIC KERATOSIS: ICD-10-CM

## 2024-07-01 DIAGNOSIS — D18.0 HEMANGIOMA: ICD-10-CM

## 2024-07-01 PROBLEM — D18.01 HEMANGIOMA OF SKIN AND SUBCUTANEOUS TISSUE: Status: ACTIVE | Noted: 2024-07-01

## 2024-07-01 PROBLEM — D17.22 BENIGN LIPOMATOUS NEOPLASM OF SKIN AND SUBCUTANEOUS TISSUE OF LEFT ARM: Status: ACTIVE | Noted: 2024-07-01

## 2024-07-01 PROBLEM — D23.5 OTHER BENIGN NEOPLASM OF SKIN OF TRUNK: Status: ACTIVE | Noted: 2024-07-01

## 2024-07-01 PROBLEM — D17.21 BENIGN LIPOMATOUS NEOPLASM OF SKIN AND SUBCUTANEOUS TISSUE OF RIGHT ARM: Status: ACTIVE | Noted: 2024-07-01

## 2024-07-01 PROBLEM — D17.23 BENIGN LIPOMATOUS NEOPLASM OF SKIN AND SUBCUTANEOUS TISSUE OF RIGHT LEG: Status: ACTIVE | Noted: 2024-07-01

## 2024-07-01 PROBLEM — D17.1 BENIGN LIPOMATOUS NEOPLASM OF SKIN AND SUBCUTANEOUS TISSUE OF TRUNK: Status: ACTIVE | Noted: 2024-07-01

## 2024-07-01 PROBLEM — D22.61 MELANOCYTIC NEVI OF RIGHT UPPER LIMB, INCLUDING SHOULDER: Status: ACTIVE | Noted: 2024-07-01

## 2024-07-01 PROBLEM — D17.24 BENIGN LIPOMATOUS NEOPLASM OF SKIN AND SUBCUTANEOUS TISSUE OF LEFT LEG: Status: ACTIVE | Noted: 2024-07-01

## 2024-07-01 PROCEDURE — 17110 DESTRUCTION B9 LES UP TO 14: CPT | Mod: 52

## 2024-07-01 PROCEDURE — ? LIQUID NITROGEN

## 2024-07-01 PROCEDURE — ? SUNSCREEN RECOMMENDATIONS

## 2024-07-01 PROCEDURE — 99213 OFFICE O/P EST LOW 20 MIN: CPT | Mod: 25

## 2024-07-01 PROCEDURE — ? TREATMENT REGIMEN

## 2024-07-01 PROCEDURE — ? COUNSELING

## 2024-07-01 ASSESSMENT — LOCATION DETAILED DESCRIPTION DERM
LOCATION DETAILED: RIGHT PROXIMAL DORSAL FOREARM
LOCATION DETAILED: RIGHT POSTERIOR SHOULDER
LOCATION DETAILED: RIGHT PROXIMAL PRETIBIAL REGION
LOCATION DETAILED: LEFT ANTERIOR PROXIMAL THIGH
LOCATION DETAILED: LEFT INFERIOR UPPER BACK
LOCATION DETAILED: LEFT INFERIOR LATERAL MALAR CHEEK
LOCATION DETAILED: RIGHT ANTERIOR DISTAL THIGH
LOCATION DETAILED: LEFT PROXIMAL DORSAL FOREARM
LOCATION DETAILED: RIGHT MEDIAL SUPERIOR CHEST
LOCATION DETAILED: LEFT POSTERIOR SHOULDER
LOCATION DETAILED: LEFT MID-UPPER BACK
LOCATION DETAILED: LEFT INFERIOR CENTRAL MALAR CHEEK
LOCATION DETAILED: LEFT LATERAL SUPERIOR CHEST
LOCATION DETAILED: LEFT RIB CAGE
LOCATION DETAILED: LEFT PROXIMAL PRETIBIAL REGION

## 2024-07-01 ASSESSMENT — LOCATION SIMPLE DESCRIPTION DERM
LOCATION SIMPLE: ABDOMEN
LOCATION SIMPLE: RIGHT PRETIBIAL REGION
LOCATION SIMPLE: LEFT UPPER BACK
LOCATION SIMPLE: LEFT CHEEK
LOCATION SIMPLE: RIGHT FOREARM
LOCATION SIMPLE: LEFT SHOULDER
LOCATION SIMPLE: RIGHT THIGH
LOCATION SIMPLE: LEFT PRETIBIAL REGION
LOCATION SIMPLE: LEFT FOREARM
LOCATION SIMPLE: CHEST
LOCATION SIMPLE: RIGHT SHOULDER
LOCATION SIMPLE: LEFT THIGH

## 2024-07-01 ASSESSMENT — LOCATION ZONE DERM
LOCATION ZONE: ARM
LOCATION ZONE: FACE
LOCATION ZONE: LEG
LOCATION ZONE: TRUNK

## 2024-07-01 NOTE — PROCEDURE: COUNSELING
Detail Level: Detailed
Detail Level: Zone
Detail Level: Generalized
Patient Specific Counseling (Will Not Stick From Patient To Patient): Discussed poss cryo of tibial location

## 2024-07-01 NOTE — PROCEDURE: LIQUID NITROGEN
Show Applicator Variable?: Yes
Spray Paint Text: The liquid nitrogen was applied to the skin utilizing a spray paint frosting technique.
Medical Necessity Information: It is in your best interest to select a reason for this procedure from the list below. All of these items fulfill various CMS LCD requirements except the new and changing color options.
Number Of Freeze-Thaw Cycles: 2 freeze-thaw cycles
Post-Care Instructions: I reviewed with the patient in detail post-care instructions. Patient is to wear sunprotection, and avoid picking at any of the treated lesions. Pt may apply Vaseline to crusted or scabbing areas.
Consent: The patient's verbal consent was obtained including but not limited to risks of crusting, scabbing, blistering, scarring, darker or lighter pigmentary change, recurrence, incomplete removal and infection.
Medical Necessity Clause: This procedure was medically necessary because the lesions that were treated were:
Detail Level: Detailed
Spray Paint Technique: No
Application Tool (Optional): Cry-AC

## 2024-07-16 PROCEDURE — RXMED WILLOW AMBULATORY MEDICATION CHARGE: Performed by: NURSE PRACTITIONER

## 2024-07-18 ENCOUNTER — PHARMACY VISIT (OUTPATIENT)
Dept: PHARMACY | Facility: MEDICAL CENTER | Age: 74
End: 2024-07-18
Payer: COMMERCIAL

## 2024-07-30 ENCOUNTER — PHARMACY VISIT (OUTPATIENT)
Dept: PHARMACY | Facility: MEDICAL CENTER | Age: 74
End: 2024-07-30
Payer: COMMERCIAL

## 2024-07-30 PROCEDURE — RXMED WILLOW AMBULATORY MEDICATION CHARGE

## 2024-08-28 PROCEDURE — RXMED WILLOW AMBULATORY MEDICATION CHARGE: Performed by: NURSE PRACTITIONER

## 2024-08-29 ENCOUNTER — PHARMACY VISIT (OUTPATIENT)
Dept: PHARMACY | Facility: MEDICAL CENTER | Age: 74
End: 2024-08-29
Payer: COMMERCIAL

## 2024-09-26 ENCOUNTER — HOSPITAL ENCOUNTER (OUTPATIENT)
Dept: LAB | Facility: MEDICAL CENTER | Age: 74
End: 2024-09-26
Attending: FAMILY MEDICINE
Payer: MEDICARE

## 2024-09-26 DIAGNOSIS — E78.5 DYSLIPIDEMIA: ICD-10-CM

## 2024-09-26 LAB
BASOPHILS # BLD AUTO: 0.9 % (ref 0–1.8)
BASOPHILS # BLD: 0.04 K/UL (ref 0–0.12)
EOSINOPHIL # BLD AUTO: 0.08 K/UL (ref 0–0.51)
EOSINOPHIL NFR BLD: 1.8 % (ref 0–6.9)
ERYTHROCYTE [DISTWIDTH] IN BLOOD BY AUTOMATED COUNT: 43.6 FL (ref 35.9–50)
HCT VFR BLD AUTO: 45.8 % (ref 37–47)
HGB BLD-MCNC: 15.2 G/DL (ref 12–16)
IMM GRANULOCYTES # BLD AUTO: 0.01 K/UL (ref 0–0.11)
IMM GRANULOCYTES NFR BLD AUTO: 0.2 % (ref 0–0.9)
LYMPHOCYTES # BLD AUTO: 0.43 K/UL (ref 1–4.8)
LYMPHOCYTES NFR BLD: 9.4 % (ref 22–41)
MCH RBC QN AUTO: 30.8 PG (ref 27–33)
MCHC RBC AUTO-ENTMCNC: 33.2 G/DL (ref 32.2–35.5)
MCV RBC AUTO: 92.9 FL (ref 81.4–97.8)
MONOCYTES # BLD AUTO: 0.47 K/UL (ref 0–0.85)
MONOCYTES NFR BLD AUTO: 10.3 % (ref 0–13.4)
NEUTROPHILS # BLD AUTO: 3.53 K/UL (ref 1.82–7.42)
NEUTROPHILS NFR BLD: 77.4 % (ref 44–72)
NRBC # BLD AUTO: 0 K/UL
NRBC BLD-RTO: 0 /100 WBC (ref 0–0.2)
PLATELET # BLD AUTO: 249 K/UL (ref 164–446)
PMV BLD AUTO: 10.4 FL (ref 9–12.9)
RBC # BLD AUTO: 4.93 M/UL (ref 4.2–5.4)
WBC # BLD AUTO: 4.6 K/UL (ref 4.8–10.8)

## 2024-09-26 PROCEDURE — 85025 COMPLETE CBC W/AUTO DIFF WBC: CPT

## 2024-09-26 PROCEDURE — 36415 COLL VENOUS BLD VENIPUNCTURE: CPT

## 2024-09-26 PROCEDURE — 84443 ASSAY THYROID STIM HORMONE: CPT

## 2024-09-26 PROCEDURE — 80061 LIPID PANEL: CPT

## 2024-09-26 PROCEDURE — 80053 COMPREHEN METABOLIC PANEL: CPT

## 2024-09-26 PROCEDURE — 82306 VITAMIN D 25 HYDROXY: CPT

## 2024-09-27 LAB
25(OH)D3 SERPL-MCNC: 57 NG/ML (ref 30–100)
ALBUMIN SERPL BCP-MCNC: 4 G/DL (ref 3.2–4.9)
ALBUMIN/GLOB SERPL: 1.5 G/DL
ALP SERPL-CCNC: 78 U/L (ref 30–99)
ALT SERPL-CCNC: 17 U/L (ref 2–50)
ANION GAP SERPL CALC-SCNC: 10 MMOL/L (ref 7–16)
AST SERPL-CCNC: 21 U/L (ref 12–45)
BILIRUB SERPL-MCNC: 0.6 MG/DL (ref 0.1–1.5)
BUN SERPL-MCNC: 17 MG/DL (ref 8–22)
CALCIUM ALBUM COR SERPL-MCNC: 9.4 MG/DL (ref 8.5–10.5)
CALCIUM SERPL-MCNC: 9.4 MG/DL (ref 8.5–10.5)
CHLORIDE SERPL-SCNC: 106 MMOL/L (ref 96–112)
CHOLEST SERPL-MCNC: 145 MG/DL (ref 100–199)
CO2 SERPL-SCNC: 24 MMOL/L (ref 20–33)
CREAT SERPL-MCNC: 0.8 MG/DL (ref 0.5–1.4)
GFR SERPLBLD CREATININE-BSD FMLA CKD-EPI: 76 ML/MIN/1.73 M 2
GLOBULIN SER CALC-MCNC: 2.7 G/DL (ref 1.9–3.5)
GLUCOSE SERPL-MCNC: 89 MG/DL (ref 65–99)
HDLC SERPL-MCNC: 53 MG/DL
LDLC SERPL CALC-MCNC: 65 MG/DL
POTASSIUM SERPL-SCNC: 4.4 MMOL/L (ref 3.6–5.5)
PROT SERPL-MCNC: 6.7 G/DL (ref 6–8.2)
SODIUM SERPL-SCNC: 140 MMOL/L (ref 135–145)
TRIGL SERPL-MCNC: 134 MG/DL (ref 0–149)
TSH SERPL-ACNC: 1.88 UIU/ML (ref 0.35–5.5)

## 2024-10-09 PROCEDURE — RXMED WILLOW AMBULATORY MEDICATION CHARGE: Performed by: NURSE PRACTITIONER

## 2024-10-11 ENCOUNTER — PHARMACY VISIT (OUTPATIENT)
Dept: PHARMACY | Facility: MEDICAL CENTER | Age: 74
End: 2024-10-11
Payer: COMMERCIAL

## 2024-10-11 NOTE — ED NOTES
No care due was identified.  Health [facility] Embedded Care Due Messages. Reference number: [id number].   10/11/2024 2:54:24 PM CDT   PIV placed. Blood and UA sent to lab.

## 2024-10-29 PROCEDURE — RXMED WILLOW AMBULATORY MEDICATION CHARGE

## 2024-10-30 ENCOUNTER — PHARMACY VISIT (OUTPATIENT)
Dept: PHARMACY | Facility: MEDICAL CENTER | Age: 74
End: 2024-10-30
Payer: COMMERCIAL

## 2024-11-14 ENCOUNTER — TELEPHONE (OUTPATIENT)
Dept: HEALTH INFORMATION MANAGEMENT | Facility: OTHER | Age: 74
End: 2024-11-14

## 2024-12-07 PROCEDURE — RXMED WILLOW AMBULATORY MEDICATION CHARGE: Performed by: NURSE PRACTITIONER

## 2024-12-13 ENCOUNTER — PHARMACY VISIT (OUTPATIENT)
Dept: PHARMACY | Facility: MEDICAL CENTER | Age: 74
End: 2024-12-13
Payer: COMMERCIAL

## 2024-12-27 PROCEDURE — RXMED WILLOW AMBULATORY MEDICATION CHARGE: Performed by: NURSE PRACTITIONER

## 2024-12-30 ENCOUNTER — TELEPHONE (OUTPATIENT)
Dept: VASCULAR LAB | Facility: MEDICAL CENTER | Age: 74
End: 2024-12-30
Payer: MEDICARE

## 2024-12-31 ENCOUNTER — PHARMACY VISIT (OUTPATIENT)
Dept: PHARMACY | Facility: MEDICAL CENTER | Age: 74
End: 2024-12-31
Payer: COMMERCIAL

## 2024-12-31 NOTE — TELEPHONE ENCOUNTER
Called and left message to schedule next follow-up for APRIL 2025 with Dr Michael Bloch for Vascular Medicine.    Katherine Gaspar, Med Ass't  [facility]  Ph. [phone]  Fx. [phone]

## 2025-01-09 ENCOUNTER — HOSPITAL ENCOUNTER (OUTPATIENT)
Dept: RADIOLOGY | Facility: MEDICAL CENTER | Age: 75
End: 2025-01-09
Attending: FAMILY MEDICINE
Payer: MEDICARE

## 2025-01-10 ENCOUNTER — HOSPITAL ENCOUNTER (OUTPATIENT)
Dept: RADIOLOGY | Facility: MEDICAL CENTER | Age: 75
End: 2025-01-10
Attending: FAMILY MEDICINE
Payer: MEDICARE

## 2025-01-10 DIAGNOSIS — M81.0 SENILE OSTEOPOROSIS: ICD-10-CM

## 2025-01-10 PROCEDURE — 77080 DXA BONE DENSITY AXIAL: CPT

## 2025-01-27 DIAGNOSIS — I72.8 ANEURYSM OF SPLENIC ARTERY (HCC): ICD-10-CM

## 2025-01-27 DIAGNOSIS — I65.23 CAROTID ATHEROSCLEROSIS, BILATERAL: ICD-10-CM

## 2025-01-27 DIAGNOSIS — I65.23 BILATERAL CAROTID ARTERY STENOSIS: ICD-10-CM

## 2025-01-27 NOTE — PROGRESS NOTES
DANYEL KISER    Patient Age: 60 year old     Medication requested to be refilled:   amitriptyline (ELAVIL) 50 MG tablet 30 tablet 0 4/6/2023     Sig: TAKE 1 TABLET BY MOUTH EVERY NIGHT      DULoxetine (CYMBALTA) 60 MG capsule 60 capsule 0 4/6/2023     Sig - Route: TAKE 2 CAPSULES BY MOUTH DAILY - Oral      No appointment is scheduled told to follow up in 6 months    Patient's last appointment with this Provider was 10/11/22         WEIGHT AND HEIGHT:   Wt Readings from Last 1 Encounters:   11/21/22 103 kg (227 lb)     Ht Readings from Last 1 Encounters:   11/21/22 5' 6\" (1.676 m)     BMI Readings from Last 1 Encounters:   11/21/22 36.64 kg/m²       ALLERGIES:  Penicillins  Current Outpatient Medications   Medication Sig Dispense Refill   • gabapentin (NEURONTIN) 400 MG capsule TAKE 1 CAPSULE BY MOUTH THREE TIMES DAILY 270 capsule 0   • DULoxetine (CYMBALTA) 60 MG capsule TAKE 2 CAPSULES BY MOUTH DAILY 60 capsule 0   • amitriptyline (ELAVIL) 50 MG tablet TAKE 1 TABLET BY MOUTH EVERY NIGHT 30 tablet 0   • estradiol (ESTRACE) 0.1 MG/GM vaginal cream Apply sparingly to affected area twice weekly 42.5 g 3   • rosuvastatin (CRESTOR) 10 MG tablet TAKE 1 TABLET BY MOUTH DAILY 90 tablet 3   • levothyroxine 25 MCG tablet Take 1 tablet by mouth daily. 90 tablet 3   • amLODIPine (NORVASC) 5 MG tablet Take 1 tablet by mouth daily. 90 tablet 1   • Nystop 359525 UNIT/GM powder Apply topically 2 times daily. 30 g 1   • clindamycin (CLEOCIN) 300 MG capsule      • doxycycline monohydrate (MONODOX) 100 MG capsule TAKE 2 CAPSULES BY MOUTH 1 HOUR PRIOR TO DENTAL PROCEDURE     • econazole (SPECTAZOLE) 1 % cream Apply twice daily to the affected areas 85 g 2   • nystatin-triamcinolone (MYCOLOG II) 461434-9.1 UNIT/GM-% cream Apply to aa BID for 2 weeks out of 4 60 g 0   • clobetasol (TEMOVATE) 0.05 % ointment Apply topically 2 times daily. For 2 weeks. Then apply twice weekly for 4 weeks. Then prn.  Apply to clitoral area. 30 g 3  Studies to Be Obtained: 1) CTA abdomen in 3 years (4/2025),   2) total vasc screen with CIMT (4/2025)      Orders placed for vascular surveillance imaging.    Audacious message sent to pt to remind that they are due for their surveillance vascular imaging.       Martine Crain R.N.   [facility]       No current facility-administered medications for this visit.       ROUTING: Patient's physician/staff        PCP: Nuvia Moore DO         INS: Payor:  [employer] / Plan:  IRVING HERNANDEZ [id number] / Product Type: MC PLATINUM   PATIENT ADDRESS:  [address] [place] [address] [place] [address]

## 2025-02-04 DIAGNOSIS — E78.5 DYSLIPIDEMIA: ICD-10-CM

## 2025-02-04 RX ORDER — EZETIMIBE 10 MG/1
10 TABLET ORAL DAILY
Qty: 100 TABLET | Refills: 3 | Status: SHIPPED | OUTPATIENT
Start: 2025-02-04

## 2025-02-05 PROCEDURE — RXMED WILLOW AMBULATORY MEDICATION CHARGE: Performed by: NURSE PRACTITIONER

## 2025-02-07 ENCOUNTER — PHARMACY VISIT (OUTPATIENT)
Dept: PHARMACY | Facility: MEDICAL CENTER | Age: 75
End: 2025-02-07
Payer: COMMERCIAL

## 2025-02-12 ENCOUNTER — APPOINTMENT (OUTPATIENT)
Dept: URBAN - METROPOLITAN AREA CLINIC 35 | Facility: CLINIC | Age: 75
Setting detail: DERMATOLOGY
End: 2025-02-12

## 2025-02-12 DIAGNOSIS — L82.0 INFLAMED SEBORRHEIC KERATOSIS: ICD-10-CM

## 2025-02-12 DIAGNOSIS — D22 MELANOCYTIC NEVI: ICD-10-CM

## 2025-02-12 DIAGNOSIS — L82.1 OTHER SEBORRHEIC KERATOSIS: ICD-10-CM

## 2025-02-12 DIAGNOSIS — L57.0 ACTINIC KERATOSIS: ICD-10-CM

## 2025-02-12 DIAGNOSIS — Z71.89 OTHER SPECIFIED COUNSELING: ICD-10-CM

## 2025-02-12 DIAGNOSIS — Z87.2 PERSONAL HISTORY OF DISEASES OF THE SKIN AND SUBCUTANEOUS TISSUE: ICD-10-CM

## 2025-02-12 DIAGNOSIS — D17 BENIGN LIPOMATOUS NEOPLASM: ICD-10-CM

## 2025-02-12 DIAGNOSIS — D18.0 HEMANGIOMA: ICD-10-CM

## 2025-02-12 DIAGNOSIS — L71.8 OTHER ROSACEA: ICD-10-CM

## 2025-02-12 DIAGNOSIS — L81.4 OTHER MELANIN HYPERPIGMENTATION: ICD-10-CM

## 2025-02-12 DIAGNOSIS — Z85.828 PERSONAL HISTORY OF OTHER MALIGNANT NEOPLASM OF SKIN: ICD-10-CM

## 2025-02-12 PROBLEM — D17.23 BENIGN LIPOMATOUS NEOPLASM OF SKIN AND SUBCUTANEOUS TISSUE OF RIGHT LEG: Status: ACTIVE | Noted: 2025-02-12

## 2025-02-12 PROBLEM — D22.61 MELANOCYTIC NEVI OF RIGHT UPPER LIMB, INCLUDING SHOULDER: Status: ACTIVE | Noted: 2025-02-12

## 2025-02-12 PROBLEM — D18.01 HEMANGIOMA OF SKIN AND SUBCUTANEOUS TISSUE: Status: ACTIVE | Noted: 2025-02-12

## 2025-02-12 PROBLEM — D17.24 BENIGN LIPOMATOUS NEOPLASM OF SKIN AND SUBCUTANEOUS TISSUE OF LEFT LEG: Status: ACTIVE | Noted: 2025-02-12

## 2025-02-12 PROBLEM — D17.1 BENIGN LIPOMATOUS NEOPLASM OF SKIN AND SUBCUTANEOUS TISSUE OF TRUNK: Status: ACTIVE | Noted: 2025-02-12

## 2025-02-12 PROBLEM — D23.5 OTHER BENIGN NEOPLASM OF SKIN OF TRUNK: Status: ACTIVE | Noted: 2025-02-12

## 2025-02-12 PROBLEM — D17.21 BENIGN LIPOMATOUS NEOPLASM OF SKIN AND SUBCUTANEOUS TISSUE OF RIGHT ARM: Status: ACTIVE | Noted: 2025-02-12

## 2025-02-12 PROBLEM — D17.22 BENIGN LIPOMATOUS NEOPLASM OF SKIN AND SUBCUTANEOUS TISSUE OF LEFT ARM: Status: ACTIVE | Noted: 2025-02-12

## 2025-02-12 PROCEDURE — 17110 DESTRUCTION B9 LES UP TO 14: CPT | Mod: 52

## 2025-02-12 PROCEDURE — ? SUNSCREEN RECOMMENDATIONS

## 2025-02-12 PROCEDURE — 17000 DESTRUCT PREMALG LESION: CPT | Mod: 59

## 2025-02-12 PROCEDURE — 17003 DESTRUCT PREMALG LES 2-14: CPT | Mod: 59

## 2025-02-12 PROCEDURE — ? COUNSELING

## 2025-02-12 PROCEDURE — ? TREATMENT REGIMEN

## 2025-02-12 PROCEDURE — 99213 OFFICE O/P EST LOW 20 MIN: CPT | Mod: 25

## 2025-02-12 PROCEDURE — ? LIQUID NITROGEN

## 2025-02-12 ASSESSMENT — LOCATION SIMPLE DESCRIPTION DERM
LOCATION SIMPLE: RIGHT SHOULDER
LOCATION SIMPLE: NOSE
LOCATION SIMPLE: LEFT PRETIBIAL REGION
LOCATION SIMPLE: RIGHT UPPER BACK
LOCATION SIMPLE: LEFT CHEEK
LOCATION SIMPLE: RIGHT PRETIBIAL REGION
LOCATION SIMPLE: RIGHT TEMPLE
LOCATION SIMPLE: LEFT UPPER BACK
LOCATION SIMPLE: ABDOMEN
LOCATION SIMPLE: CHEST
LOCATION SIMPLE: LEFT SHOULDER
LOCATION SIMPLE: LEFT FOREARM
LOCATION SIMPLE: LEFT THIGH
LOCATION SIMPLE: RIGHT FOREARM
LOCATION SIMPLE: RIGHT THIGH

## 2025-02-12 ASSESSMENT — LOCATION DETAILED DESCRIPTION DERM
LOCATION DETAILED: LEFT PROXIMAL RADIAL DORSAL FOREARM
LOCATION DETAILED: LEFT ANTERIOR PROXIMAL THIGH
LOCATION DETAILED: LEFT INFERIOR LATERAL MALAR CHEEK
LOCATION DETAILED: RIGHT MEDIAL SUPERIOR CHEST
LOCATION DETAILED: RIGHT MID TEMPLE
LOCATION DETAILED: LEFT INFERIOR CENTRAL MALAR CHEEK
LOCATION DETAILED: RIGHT PROXIMAL DORSAL FOREARM
LOCATION DETAILED: LEFT RIB CAGE
LOCATION DETAILED: LEFT PROXIMAL DORSAL FOREARM
LOCATION DETAILED: LEFT LATERAL SUPERIOR CHEST
LOCATION DETAILED: LEFT MEDIAL MANDIBULAR CHEEK
LOCATION DETAILED: RIGHT POSTERIOR SHOULDER
LOCATION DETAILED: LEFT INFERIOR UPPER BACK
LOCATION DETAILED: LEFT PROXIMAL PRETIBIAL REGION
LOCATION DETAILED: RIGHT PROXIMAL PRETIBIAL REGION
LOCATION DETAILED: NASAL TIP
LOCATION DETAILED: LEFT POSTERIOR SHOULDER
LOCATION DETAILED: LEFT MID-UPPER BACK
LOCATION DETAILED: RIGHT LATERAL UPPER BACK
LOCATION DETAILED: RIGHT ANTERIOR DISTAL THIGH

## 2025-02-12 ASSESSMENT — LOCATION ZONE DERM
LOCATION ZONE: FACE
LOCATION ZONE: TRUNK
LOCATION ZONE: LEG
LOCATION ZONE: NOSE
LOCATION ZONE: ARM

## 2025-02-12 NOTE — PROCEDURE: LIQUID NITROGEN
Duration Of Freeze Thaw-Cycle (Seconds): 2
Render Post-Care Instructions In Note?: no
Show Applicator Variable?: Yes
Detail Level: Detailed
Application Tool (Optional): Cry-AC
Post-Care Instructions: I reviewed with the patient in detail post-care instructions. Patient is to wear sunprotection, and avoid picking at any of the treated lesions. Pt may apply Vaseline to crusted or scabbing areas.
Consent: The patient's consent was obtained including but not limited to risks of crusting, scabbing, blistering, scarring, darker or lighter pigmentary change, recurrence, incomplete removal and infection.
Number Of Freeze-Thaw Cycles: 2 freeze-thaw cycles
Consent: The patient's verbal consent was obtained including but not limited to risks of crusting, scabbing, blistering, scarring, darker or lighter pigmentary change, recurrence, incomplete removal and infection.
Medical Necessity Clause: This procedure was medically necessary because the lesions that were treated were:
Spray Paint Text: The liquid nitrogen was applied to the skin utilizing a spray paint frosting technique.
Medical Necessity Information: It is in your best interest to select a reason for this procedure from the list below. All of these items fulfill various CMS LCD requirements except the new and changing color options.

## 2025-02-21 ENCOUNTER — HOSPITAL ENCOUNTER (OUTPATIENT)
Dept: RADIOLOGY | Facility: MEDICAL CENTER | Age: 75
End: 2025-02-21
Attending: NURSE PRACTITIONER
Payer: MEDICARE

## 2025-02-21 DIAGNOSIS — N64.4 MASTODYNIA: ICD-10-CM

## 2025-02-21 PROCEDURE — 76642 ULTRASOUND BREAST LIMITED: CPT | Mod: LT

## 2025-02-21 PROCEDURE — G0279 TOMOSYNTHESIS, MAMMO: HCPCS

## 2025-03-10 ENCOUNTER — OUTPATIENT INFUSION SERVICES (OUTPATIENT)
Dept: ONCOLOGY | Facility: MEDICAL CENTER | Age: 75
End: 2025-03-10
Attending: FAMILY MEDICINE
Payer: MEDICARE

## 2025-03-10 VITALS
BODY MASS INDEX: 22.78 KG/M2 | RESPIRATION RATE: 18 BRPM | DIASTOLIC BLOOD PRESSURE: 70 MMHG | OXYGEN SATURATION: 97 % | SYSTOLIC BLOOD PRESSURE: 126 MMHG | HEIGHT: 66 IN | WEIGHT: 141.76 LBS | HEART RATE: 66 BPM | TEMPERATURE: 97.7 F

## 2025-03-10 DIAGNOSIS — M81.0 AGE-RELATED OSTEOPOROSIS WITHOUT CURRENT PATHOLOGICAL FRACTURE: ICD-10-CM

## 2025-03-10 LAB
CA-I BLD ISE-SCNC: 1.21 MMOL/L (ref 1.1–1.3)
CREAT BLD-MCNC: 0.7 MG/DL (ref 0.5–1.4)

## 2025-03-10 PROCEDURE — 82330 ASSAY OF CALCIUM: CPT

## 2025-03-10 PROCEDURE — 700111 HCHG RX REV CODE 636 W/ 250 OVERRIDE (IP): Mod: JZ,TB | Performed by: FAMILY MEDICINE

## 2025-03-10 PROCEDURE — 82565 ASSAY OF CREATININE: CPT

## 2025-03-10 PROCEDURE — 36415 COLL VENOUS BLD VENIPUNCTURE: CPT

## 2025-03-10 PROCEDURE — 96372 THER/PROPH/DIAG INJ SC/IM: CPT

## 2025-03-10 RX ADMIN — DENOSUMAB 60 MG: 60 INJECTION SUBCUTANEOUS at 14:01

## 2025-03-10 ASSESSMENT — FIBROSIS 4 INDEX: FIB4 SCORE: 1.55

## 2025-03-10 NOTE — PROGRESS NOTES
Isabel arrived ambulatory for Q 6 month Prolia injection. She denies any s/s acute infection or illness, denies dental procedures in the past 4 weeks or upcoming dental procedures, denies s/s of hypocalcimia.  Pt confirms taking calcuim/vitamin D at home.    Istat Ca/Cr lab drawn from left AC, sterile gauze and coban to site.  Lab parameters met, Prolia injected Sub Q to back left arm per MAR, bandaid applied to site.  Isabel tolerated well, discharged in no apparent distress, scheduling emailed to schedule next appointment.

## 2025-03-14 NOTE — Clinical Note
[facility]  [address] [place] [address]    [id number]    Isabel Peralta  [address]  [place] [address]    March 14, 2025    Member Name: Isabel Peralta   Member Number: [id number]   Reference Number: [id number]   Approved Services: MRI/CAT Scan   Approved Service Dates: 03/14/2025 - 07/12/2025   Requesting Provider: Michael J Bloch   Requested Provider: [facility]     Dear Isabel Peralta:    The following medical service(s) requested by Michael J Bloch have been approved:    Procedure Code Procedure Code Name Requested Quantity Approved Quantity Status   81287 (CPT®) IN CT ANGIO, ABD, COMBO,INCL IMAGE PROC 1 1 Authorized       Approved Quantity means the number of visits approved for medication treatments and/or medical services.    The services should be provided by [facility] no later than 07/12/2025. Please contact the provider listed below with any questions.     Provider Information:  [facility]  [phone]    Your plan benefit may require a deductible, co-payment or coinsurance for these services. This authorization does not guarantee [facility] will pay the claim for services that you receive. Payment by [facility] for these services is subject to the terms of your Evidence of Coverage, your eligibility at the time of service, and confirmation of benefit coverage.    For any questions or additional information, please contact Customer Service:    [facility] Toll Free: [phone]  TTY users dial: [phone]   Call Center Hours:  Oct 1 - Mar 31, Mon - Fri 7 AM to 8 PM PST  Oct 1 - Mar 31, Sat - Sun 8 AM to 8 PM PST  Apr 1 - Sep 30, Mon - Fri 7 AM to 8 PM PST   Office Hours: Mon - Fri 8 AM to 5 PM PST   E-mail: [email or web address]   Website:  [email or web address]      This information is available for free in other languages. Please  contact Customer Service at the phone number above for more information. [facility] complies with applicable Federal civil rights laws and does not discriminate on the basis of race, color, national origin, age, disability or sex.    Sincerely,     Healthcare Utilization Management Department     Cc: [facility]   Michael J Bloch    Multi-Language Insert  Multi- Services  English: We have free  services to answer any questions you may have about our health or drug plan.  To get an , just call us at [phone].  Someone who speaks English/Language can help you.  This is a free service.  English: Tenemos servicios de intérprete sin costo alguno  para responder cualquier pregunta que pueda tener sobre nuestro plan de max o medicamentos. Para hablar con un intérprete, por favor llame al [phone]. Alguien que hable español le podrá ayudar. Jazzy es un servicio gratuito.  Chinese Mandarin: ?????????????????????????????? ???????????????? [phone]????????????????? ?????????  Chinese Cantonese: ?????????????????????????????? ????????????? [phone]???????????????????? ????????  Tagalog:  Mayroon kaming libreng serbisyo sa franksaerica whaleya lazarus ugaldengwindy cesargmirela o panggamot.  sujey Martinez  [phone]. Maaan[place]ko [place].  Fabricio padilla.  Vietnamese:  Nous proposons carmella services gratuits d'interprétation pour répondre à toutes mateus questions relatives à notre régime de santé ou d'assurance-médicaments. Pour accéder au service d'interprétation, il vous suffit de nous appeler au [phone]. Un interlocuteur parleslie Françalessia pourra vous aider. Ce service est gratuit.  Setswana:  Vikki haile có d?ch v? thông d?ch mi?n phí ð? tr? l?i các câu h?i v? chýõng s?c kh?e và julianýõng [facility]  thu?c men. N?u quí v? c?n thông d?ch viên marlyn g?i [phone] s? có nhân viên nói ti?ng Vi?t giúp ð? quí v?. Ðây là d?ch v? mi?n phí .  Occitan:  Unser kostenloser Dolmetscherservice beantwortet Ihren Fragen zu unserem Gesundheits- und Arzneimittelplan. Unsere Dolmetscher erreichen Sie [phone]. Man wird Ihnen anjelica auf [facility]. Dieser Service ist reganJordan [facility] Medical Center.  Faroese:  ??? ?? ?? ?? ?? ??? ?? ??? ?? ???? ?? ?? ???? ???? ????. ?? ???? ????? ?? [phone] ??? ??? ????.  ???? ?? ???? ?? ?? ????. ? ???? ??? ?????.   Mauritanian: Åñëè ó âàñ âîçíèêíóò âîïðîñû îòíîñèòåëüíî ñòðàõîâîãî èëè ìåäèêàìåíòíîãî ïëàíà, âû ìîæåòå âîñïîëüçîâàòüñÿ íàøèìè áåñïëàòíûìè óñëóãàìè ïåðåâîä÷èêîâ. ×òîáû âîñïîëüçîâàòüñÿ óñëóãàìè ïåðåâîä÷èêà, ïîçâîíèòå íàì ïî òåëåôîíó [phone]. Âàì îêàæåò ïîìîùü ñîòðóäíèê, êîòîðûé ãîâîðèò ïî-póññêè. Äàííàÿ óñëóãà áåñïëàòíàÿ.  Telugu: ÅääÇ äÞÏã ÎÏãÇÊ ÇáãÊÑÌã ÇáÝæÑí ÇáãÌÇäíÉ ááÅÌÇÈÉ Úä Ãí ÃÓÆáÉ ÊÊÚáÞ ÈÇáÕÍÉ Ãæ ÌÏæá ÇáÃÏæíÉ áÏíäÇ. ááÍÕæá Úáì ãÊÑÌã ÝæÑí¡ áíÓ Úáíß Óæì ÇáÇÊÕÇá ÈäÇ Úáì [phone] . ÓíÞæã ÔÎÕ ãÇ íÊÍÏË ÇáÚÑÈíÉ ÈãÓÇÚÏÊß. åÐå ÎÏãÉ ãÌÇäíÉ.  Reyes: ????? ????????? ?? ??? ?? ????? ?? ???? ??? ???? ???? ?? ?????? ?? ???? ???? ?? ??? ????? ??? ????? ???????? ?????? ?????? ???. ?? ???????? ??????? ???? ?? ???, ?? ???? [phone] ?? ??? ????. ??? ??????? ?? ?????? ????? ?? ???? ??? ?? ???? ??. ?? ?? ????? ???? ??.   Ugandan:  È disponibile un servizio di interpretariato gratuito per rispondere a eventuali domande sul nostro piano sanitario e farmaceutico. Per un interprete, contattare il dilip [phone]. Un nostro incaricato harshad parla Italianovi fornirà l'assistenza necessaria. È un servizio gratuito.  Portugués:  Dispomos de serviços de interpretação gratuitos para responder a qualquer questão que tenha acerca do nosso plano de saúde ou de medicação. Para obter um intérprete, contacte-nos através do número [phone]. Irá encontrar alguém que fale o idioma  Português para  o ajudar. Jazzy serviço é gratuito.  East Timorese Creole:  Nou genyen sèvis entèprèt gratis erna reponn tout kesyon ou ta genyen konsènan plan medikal oswa dwòg nou an.  Erna jwenn yon entèprèt, jis rele nou nan [phone]. Yon moun ki pale Kreyòl kapab mike w.  Sa a se yon sèvis ki gratis.  Polish:  Umo¿liwiamy bezp³atne skorzystanie z us³ug t³umacza ustnego, który pomo¿e w uzyskaniu odpowiedzi na temat planu zdrowotnego lub dawkowania leblakew. Ngozi skorzystaæ z pomocy t³umacza znaj¹cego kofi rios¿y zadzwoniæ pod numer [phone]. Ta us³uga jest bezp³atna.  Kazakh: ????? ??????? ????????????????????? ??????????????????????????????????[phone] ???????????????? ? ????????????????? ?????

## 2025-03-20 ENCOUNTER — HOSPITAL ENCOUNTER (OUTPATIENT)
Dept: LAB | Facility: MEDICAL CENTER | Age: 75
End: 2025-03-20
Attending: FAMILY MEDICINE
Payer: MEDICARE

## 2025-03-20 LAB
25(OH)D3 SERPL-MCNC: 52 NG/ML (ref 30–100)
ALBUMIN SERPL BCP-MCNC: 4.1 G/DL (ref 3.2–4.9)
ALBUMIN/GLOB SERPL: 1.6 G/DL
ALP SERPL-CCNC: 88 U/L (ref 30–99)
ALT SERPL-CCNC: 18 U/L (ref 2–50)
ANION GAP SERPL CALC-SCNC: 10 MMOL/L (ref 7–16)
APPEARANCE UR: CLEAR
AST SERPL-CCNC: 25 U/L (ref 12–45)
BASOPHILS # BLD AUTO: 1 % (ref 0–1.8)
BASOPHILS # BLD: 0.04 K/UL (ref 0–0.12)
BILIRUB SERPL-MCNC: 0.5 MG/DL (ref 0.1–1.5)
BILIRUB UR QL STRIP.AUTO: NEGATIVE
BUN SERPL-MCNC: 18 MG/DL (ref 8–22)
CALCIUM ALBUM COR SERPL-MCNC: 9 MG/DL (ref 8.5–10.5)
CALCIUM SERPL-MCNC: 9.1 MG/DL (ref 8.5–10.5)
CHLORIDE SERPL-SCNC: 109 MMOL/L (ref 96–112)
CHOLEST SERPL-MCNC: 143 MG/DL (ref 100–199)
CO2 SERPL-SCNC: 22 MMOL/L (ref 20–33)
COLOR UR: YELLOW
CREAT SERPL-MCNC: 0.82 MG/DL (ref 0.5–1.4)
EOSINOPHIL # BLD AUTO: 0.08 K/UL (ref 0–0.51)
EOSINOPHIL NFR BLD: 1.9 % (ref 0–6.9)
ERYTHROCYTE [DISTWIDTH] IN BLOOD BY AUTOMATED COUNT: 46.4 FL (ref 35.9–50)
FASTING STATUS PATIENT QL REPORTED: NORMAL
GFR SERPLBLD CREATININE-BSD FMLA CKD-EPI: 74 ML/MIN/1.73 M 2
GLOBULIN SER CALC-MCNC: 2.6 G/DL (ref 1.9–3.5)
GLUCOSE SERPL-MCNC: 90 MG/DL (ref 65–99)
GLUCOSE UR STRIP.AUTO-MCNC: NEGATIVE MG/DL
HCT VFR BLD AUTO: 46 % (ref 37–47)
HDLC SERPL-MCNC: 61 MG/DL
HGB BLD-MCNC: 15 G/DL (ref 12–16)
IMM GRANULOCYTES # BLD AUTO: 0.01 K/UL (ref 0–0.11)
IMM GRANULOCYTES NFR BLD AUTO: 0.2 % (ref 0–0.9)
KETONES UR STRIP.AUTO-MCNC: NEGATIVE MG/DL
LDLC SERPL CALC-MCNC: 66 MG/DL
LEUKOCYTE ESTERASE UR QL STRIP.AUTO: NEGATIVE
LYMPHOCYTES # BLD AUTO: 0.44 K/UL (ref 1–4.8)
LYMPHOCYTES NFR BLD: 10.6 % (ref 22–41)
MCH RBC QN AUTO: 31.3 PG (ref 27–33)
MCHC RBC AUTO-ENTMCNC: 32.6 G/DL (ref 32.2–35.5)
MCV RBC AUTO: 96 FL (ref 81.4–97.8)
MICRO URNS: NORMAL
MONOCYTES # BLD AUTO: 0.44 K/UL (ref 0–0.85)
MONOCYTES NFR BLD AUTO: 10.6 % (ref 0–13.4)
NEUTROPHILS # BLD AUTO: 3.15 K/UL (ref 1.82–7.42)
NEUTROPHILS NFR BLD: 75.7 % (ref 44–72)
NITRITE UR QL STRIP.AUTO: NEGATIVE
NRBC # BLD AUTO: 0 K/UL
NRBC BLD-RTO: 0 /100 WBC (ref 0–0.2)
PH UR STRIP.AUTO: 7 [PH] (ref 5–8)
PLATELET # BLD AUTO: 255 K/UL (ref 164–446)
PMV BLD AUTO: 10.4 FL (ref 9–12.9)
POTASSIUM SERPL-SCNC: 4.4 MMOL/L (ref 3.6–5.5)
PROT SERPL-MCNC: 6.7 G/DL (ref 6–8.2)
PROT UR QL STRIP: NEGATIVE MG/DL
RBC # BLD AUTO: 4.79 M/UL (ref 4.2–5.4)
RBC UR QL AUTO: NEGATIVE
SODIUM SERPL-SCNC: 141 MMOL/L (ref 135–145)
SP GR UR STRIP.AUTO: 1.02
TRIGL SERPL-MCNC: 81 MG/DL (ref 0–149)
TSH SERPL-ACNC: 2.01 UIU/ML (ref 0.35–5.5)
UROBILINOGEN UR STRIP.AUTO-MCNC: 1 EU/DL
WBC # BLD AUTO: 4.2 K/UL (ref 4.8–10.8)

## 2025-03-20 PROCEDURE — 81003 URINALYSIS AUTO W/O SCOPE: CPT

## 2025-03-20 PROCEDURE — RXMED WILLOW AMBULATORY MEDICATION CHARGE: Performed by: NURSE PRACTITIONER

## 2025-03-20 PROCEDURE — 80053 COMPREHEN METABOLIC PANEL: CPT

## 2025-03-20 PROCEDURE — 84443 ASSAY THYROID STIM HORMONE: CPT

## 2025-03-20 PROCEDURE — 36415 COLL VENOUS BLD VENIPUNCTURE: CPT

## 2025-03-20 PROCEDURE — 80061 LIPID PANEL: CPT

## 2025-03-20 PROCEDURE — 82306 VITAMIN D 25 HYDROXY: CPT

## 2025-03-20 PROCEDURE — 85025 COMPLETE CBC W/AUTO DIFF WBC: CPT

## 2025-03-24 ENCOUNTER — PHARMACY VISIT (OUTPATIENT)
Dept: PHARMACY | Facility: MEDICAL CENTER | Age: 75
End: 2025-03-24
Payer: COMMERCIAL

## 2025-03-26 ENCOUNTER — HOSPITAL ENCOUNTER (OUTPATIENT)
Dept: RADIOLOGY | Facility: MEDICAL CENTER | Age: 75
End: 2025-03-26
Attending: INTERNAL MEDICINE
Payer: COMMERCIAL

## 2025-03-26 ENCOUNTER — RESULTS FOLLOW-UP (OUTPATIENT)
Dept: VASCULAR LAB | Facility: MEDICAL CENTER | Age: 75
End: 2025-03-26

## 2025-03-26 ENCOUNTER — DOCUMENTATION (OUTPATIENT)
Dept: VASCULAR LAB | Facility: MEDICAL CENTER | Age: 75
End: 2025-03-26

## 2025-03-26 DIAGNOSIS — I65.23 CAROTID ATHEROSCLEROSIS, BILATERAL: ICD-10-CM

## 2025-03-26 DIAGNOSIS — I65.23 BILATERAL CAROTID ARTERY STENOSIS: ICD-10-CM

## 2025-03-26 DIAGNOSIS — I72.8 ANEURYSM OF SPLENIC ARTERY (HCC): ICD-10-CM

## 2025-03-26 PROCEDURE — 700117 HCHG RX CONTRAST REV CODE 255: Performed by: INTERNAL MEDICINE

## 2025-03-26 PROCEDURE — 93998 UNLISTD NONINVAS VASC DX STD: CPT

## 2025-03-26 PROCEDURE — 306723 US-TOTAL VASCULAR SCREENING (S/P)

## 2025-03-26 PROCEDURE — 74175 CTA ABDOMEN W/CONTRAST: CPT

## 2025-03-26 RX ADMIN — IOHEXOL 100 ML: 350 INJECTION, SOLUTION INTRAVENOUS at 12:18

## 2025-03-27 NOTE — PROGRESS NOTES
CTA shows splenic art aneurysm x 2 measures 11mm and 12mm, unchanged from prior.   Initially noted 2016 w/o major growth.   Plan for repeat CTA abd in 3 yrs (3/2028)    Total vasc screening exam shows no abnormalities in carotid, abd Ao, and normal FILI.  No further surveillance recommended

## 2025-04-17 ENCOUNTER — TELEPHONE (OUTPATIENT)
Dept: VASCULAR LAB | Facility: MEDICAL CENTER | Age: 75
End: 2025-04-17
Payer: MEDICARE

## 2025-04-17 NOTE — TELEPHONE ENCOUNTER
Established patient  Chart prep for upcoming appointment.    Any pending/incomplete orders from last visit? No, all orders completed.  Was patient called and reminded to complete pending orders? N/A orders complete  Were any records requested?  No    Referral up to date? Yes  Referral attached to appointment (renewals and New patients only)? N/A (established with up-to-date referral)  Virtual appointment? No    Katherine Gaspar, Med Ass't  [facility]  Ph. [phone]  Fx. [phone]

## 2025-04-22 ENCOUNTER — OFFICE VISIT (OUTPATIENT)
Dept: VASCULAR LAB | Facility: MEDICAL CENTER | Age: 75
End: 2025-04-22
Attending: INTERNAL MEDICINE
Payer: MEDICARE

## 2025-04-22 VITALS
DIASTOLIC BLOOD PRESSURE: 70 MMHG | SYSTOLIC BLOOD PRESSURE: 118 MMHG | HEIGHT: 66 IN | BODY MASS INDEX: 22.66 KG/M2 | WEIGHT: 141 LBS | HEART RATE: 64 BPM

## 2025-04-22 DIAGNOSIS — E78.5 DYSLIPIDEMIA: ICD-10-CM

## 2025-04-22 DIAGNOSIS — I72.8 ANEURYSM OF SPLENIC ARTERY (HCC): ICD-10-CM

## 2025-04-22 PROCEDURE — 3078F DIAST BP <80 MM HG: CPT | Performed by: INTERNAL MEDICINE

## 2025-04-22 PROCEDURE — 99214 OFFICE O/P EST MOD 30 MIN: CPT | Performed by: INTERNAL MEDICINE

## 2025-04-22 PROCEDURE — 99212 OFFICE O/P EST SF 10 MIN: CPT

## 2025-04-22 PROCEDURE — G2211 COMPLEX E/M VISIT ADD ON: HCPCS | Performed by: INTERNAL MEDICINE

## 2025-04-22 PROCEDURE — 3074F SYST BP LT 130 MM HG: CPT | Performed by: INTERNAL MEDICINE

## 2025-04-22 ASSESSMENT — FIBROSIS 4 INDEX: FIB4 SCORE: 1.76

## 2025-04-22 NOTE — PROGRESS NOTES
FOLLOW-UP VASCULAR VISIT    Subjective:   Isabel Peralta is a 74 y.o. female who presents today 23 for   Chief Complaint   Patient presents with    Follow-Up     Subjective    HPI:    Feeling well overall  Denies CP, SOB, palpitations    Blood pressure  Home BP a few readings -150, most consistently <130/80    Hyperlipidemia:    Stable, no current concerns  Current treatment: rosuva 5mg 4 times a week + Zetia daily  Myalgias? No  Other adverse drug reactions? No    Carotid artery disease:   No h/o tia or cva symptoms including presyncope, syncope or HA.  No current symptoms   Had CIMT and vascular screen performed 2025    Splenic a. Aneurysm - denies LUQ pain or dullness, stable on prior serial exams.  Had CTA performed 2025    Antiplat: Taking ASA w/o bleeding issues.     Social History     Tobacco Use   Smoking Status Former    Current packs/day: 0.00    Average packs/day: 0.5 packs/day for 4.0 years (2.0 ttl pk-yrs)    Types: Cigarettes    Start date: 1967    Quit date: 1971    Years since quittin.3   Smokeless Tobacco Never   Tobacco Comments    quit 49 years ago     Social History     Tobacco Use    Smoking status: Former     Current packs/day: 0.00     Average packs/day: 0.5 packs/day for 4.0 years (2.0 ttl pk-yrs)     Types: Cigarettes     Start date: 1967     Quit date: 1971     Years since quittin.3    Smokeless tobacco: Never    Tobacco comments:     quit 49 years ago   Vaping Use    Vaping status: Never Used   Substance Use Topics    Alcohol use: Not Currently     Comment: on the holidays    Drug use: No     DIET AND EXERCISE:  Weight Change: stable  Diet: gluten-free with high volume veg/fruits due to celiac dz   Exercise: moderate regular exercise program, doing PT for shoulder, stays active with house and yard work.         Objective       Objective:     Vitals:    25 1043   BP: 118/70   BP Location: Left arm   Patient Position: Sitting  "  BP Cuff Size: Adult   Pulse: 64   Weight: 64 kg (141 lb)   Height: 1.676 m (5' 6\")      BP Readings from Last 4 Encounters:   04/22/25 118/70   03/10/25 126/70   06/18/24 129/78   04/23/24 131/83     Body mass index is 22.76 kg/m².  Physical Exam  Vitals reviewed.   Constitutional:       General: She is not in acute distress.     Appearance: Normal appearance. She is well-developed. She is not diaphoretic.   HENT:      Head: Normocephalic and atraumatic.   Cardiovascular:      Rate and Rhythm: Normal rate and regular rhythm.      Heart sounds: Normal heart sounds. No murmur heard.  Pulmonary:      Effort: Pulmonary effort is normal. No respiratory distress.      Breath sounds: Normal breath sounds. No wheezing.   Musculoskeletal:         General: No swelling. Normal range of motion.      Right lower leg: No edema.      Left lower leg: No edema.   Skin:     General: Skin is warm and dry.      Coloration: Skin is not pale.   Neurological:      General: No focal deficit present.      Mental Status: She is alert and oriented to person, place, and time.      Coordination: Coordination normal.      Gait: Gait normal.      Deep Tendon Reflexes: Reflexes are normal and symmetric.   Psychiatric:         Mood and Affect: Mood normal.         Behavior: Behavior normal.       Lab Results   Component Value Date    CHOLSTRLTOT 143 03/20/2025    LDL 66 03/20/2025    HDL 61 03/20/2025    TRIGLYCERIDE 81 03/20/2025           Lab Results   Component Value Date    SODIUM 141 03/20/2025    POTASSIUM 4.4 03/20/2025    CHLORIDE 109 03/20/2025    CO2 22 03/20/2025    GLUCOSE 90 03/20/2025    BUN 18 03/20/2025    CREATININE 0.82 03/20/2025        Lab Results   Component Value Date    WBC 4.2 (L) 03/20/2025    RBC 4.79 03/20/2025    HEMOGLOBIN 15.0 03/20/2025    HEMATOCRIT 46.0 03/20/2025    MCV 96.0 03/20/2025    MCH 31.3 03/20/2025    MCHC 32.6 03/20/2025    MPV 10.4 03/20/2025      CTA abdomen aug 2016:  1.  There is no change in 2 " small splenic artery aneurysms measuring 10-11 mm in diameter with minimal peripheral thrombus and some peripheral calcification.  2.  There is mild atherosclerosis of the abdominal aorta.  3.  There is normal variant branching at the celiac axis with the common hepatic artery originating directly from the abdominal aorta at the level of the celiac axis.  4.  There may be a small hiatal hernia again seen    Stress MPI Jan 2017:  NUCLEAR IMAGING INTERPRETATION   No evidence of significant jeopardized viable myocardium or prior myocardial    infarction.   Normal left ventricular wall motion.  LV ejection fraction = 77%.   ECG INTERPRETATION   Negative stress ECG for ischemia.    Carotid duplex april 2017:  1.  There is a mild amount of atherosclerotic plaque.  Plaque is located in carotid bulbs and proximal internal and external carotid arteries.  Plaque characterization:  Calcific and irregular  2.  There is no evidence of carotid occlusion.  3. Vertebral arteries demonstrate antegrade flow.  4. Diameter reduction in the internal carotid arteries: less than 50%. There is no hemodynamically significant stenosis.    MRI head april 2017:  MRI of the brain without and with contrast within normal limits.    CTA abd/pelvis 4/8/19  1.  There is no interval change in 2 small splenic artery aneurysms each measuring 10-11 mm in diameter with minimal peripheral thrombus and calcification.  2.  There is mild atherosclerosis of the abdominal aorta.  3.  There are no suspicious parenchymal organ findings.  Variant anatomy with the common hepatic artery originating directly from the aorta is again noted. Celiac axis and SMA are patent. CYNTHIA is patent. Both renal arteries are patent  .  Total Vascular screening 5/22/20    1. Mild atherosclerosis as detailed above.   2. No significant stenosis seen.    CTA ABD 3/28/22  1.  2 partially calcified splenic artery aneurysms unchanged compared to previous.  2.  Mild/moderate calcified  plaque abdominal aorta. No aneurysmal dilatation and no dissection.    CTA abdomen march 2025  1.  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  2.  Splenic artery aneurysms x2 again seen measuring 11 mm and 12 mm respectively. No significant change in morphology. Negligible interval increase in measurable size. The aneurysms remain well below 2 cm.  3.  No acute intra-abdominal process.    CIMT/vascular screen march 2025  Mean CIMT 0.679  Mild smooth plaque in the carotids  No significant carotid stenosis  No AAA  Normal FILI      Medical Decision Making:  Today's Assessment / Status / Plan:     1. Dyslipidemia  APOLIPOPROTEIN B    Lipoprotein (a)    Comp Metabolic Panel    Lipid Profile      2. Aneurysm of splenic artery (HCC)          Patient Type: Primary Prevention.    Etiology of Established CVD if Present:     1) Mild carotid atherosclerosis, asymptomatic, no previous intervention. -Less than 50% stenosis.  CIMT actually appears fairly favorable.    -Continue medical management.    -Recheck CIMT and vascular screen in approximately 3 years (March 2027)    2) Splenic artery aneurysm - stable over time.  No current symptoms.  No known aneurysmal disease in other vascular territories  - Repeat CTA every 3 yrs (2028)  - We discussed whether or not to get an MRI of her head but using shared decision making decided to hold off    Lipid Management: Qualifies for Statin Therapy Based on 2013 ACC/AHA Guidelines: yes  Calculated 10-Year Risk of ASCVD:  10.4%  Currently on Statin:  Crestor 5mg 4x/week  On Zetia daily  Given subclinical atherosclerosis and possible family history would like to treat as high risk with goal LDL-C <70 and nonHDL <100.   Lp(a) normal.   - currently at goal:  Yes   Plan:  - continue zetia 10mg daily   - continue Crestor 5 mg, 4x per week   - stop and call if any myalgias or abdominal symptoms  - continue gluten-free diet  -Recheck lipid panel and apolipoprotein B in 12  months    Blood Pressure Management: ACC/AHA (2017) goal <130/80  Home BP at goal: yes, occasional -150  Office BP at goal:  Yes  Echo: not done   ACR: note done   Plan:   Monitoring:   - continue home BP monitoring, reviewed correct technique:  Yes   - call if BP consistently >130/80  - order 24h ABPM:  NO  Medications: no meds indicated at this time   - continue home BP readings and bring into next visit      Glycemic Status: Normal  - Continue lifestyle modification    Anti-Platelet/Anti-Coagulant Tx:   We discussed the risk and benefits of antiplatelet therapy.  Given that her atherosclerosis is very mild and stable I think the risk probably outweighs the benefit  - Stop low-dose aspirin    Smoking: continued complete avoidance of all tobacco products     Physical Activity: continue healthy activity to improve CV fitness; walking on treadmill at home     Weight Management and Nutrition: Dietary plan was discussed with patient at this visit including DASH, low sodium     Instructed to follow-up with PCP for remainder of adult medical needs: yes  We will partner with other providers in the management of established vascular disease and cardiometabolic risk factors.    Studies to Be Obtained:   1) CTA abdomen in 3 years (March 2028),   2) total vasc screen with CIMT in 3 years (March 2028)      Labs to Be Obtained: As above prior to next visit    Follow up in: 1 yr     Michael J Bloch, M.D.    Cc:  ANJEL Bean MD

## 2025-05-20 PROCEDURE — RXMED WILLOW AMBULATORY MEDICATION CHARGE: Performed by: NURSE PRACTITIONER

## 2025-05-21 ENCOUNTER — PHARMACY VISIT (OUTPATIENT)
Dept: PHARMACY | Facility: MEDICAL CENTER | Age: 75
End: 2025-05-21
Payer: COMMERCIAL

## 2025-06-17 PROCEDURE — RXMED WILLOW AMBULATORY MEDICATION CHARGE: Performed by: NURSE PRACTITIONER

## 2025-06-19 ENCOUNTER — PHARMACY VISIT (OUTPATIENT)
Dept: PHARMACY | Facility: MEDICAL CENTER | Age: 75
End: 2025-06-19
Payer: COMMERCIAL

## 2025-06-26 DIAGNOSIS — E78.5 DYSLIPIDEMIA: ICD-10-CM

## 2025-06-26 PROCEDURE — RXMED WILLOW AMBULATORY MEDICATION CHARGE

## 2025-06-26 RX ORDER — ROSUVASTATIN CALCIUM 5 MG/1
TABLET, COATED ORAL
Qty: 100 TABLET | Refills: 3 | Status: SHIPPED | OUTPATIENT
Start: 2025-06-26

## 2025-06-27 ENCOUNTER — PHARMACY VISIT (OUTPATIENT)
Dept: PHARMACY | Facility: MEDICAL CENTER | Age: 75
End: 2025-06-27
Payer: COMMERCIAL

## 2025-08-28 PROCEDURE — RXMED WILLOW AMBULATORY MEDICATION CHARGE: Performed by: NURSE PRACTITIONER

## 2025-08-29 ENCOUNTER — PHARMACY VISIT (OUTPATIENT)
Dept: PHARMACY | Facility: MEDICAL CENTER | Age: 75
End: 2025-08-29
Payer: COMMERCIAL